# Patient Record
Sex: FEMALE | Race: WHITE | Employment: OTHER | URBAN - METROPOLITAN AREA
[De-identification: names, ages, dates, MRNs, and addresses within clinical notes are randomized per-mention and may not be internally consistent; named-entity substitution may affect disease eponyms.]

---

## 2017-01-09 ENCOUNTER — HOSPITAL ENCOUNTER (EMERGENCY)
Age: 82
Discharge: HOME | End: 2017-01-09
Attending: EMERGENCY MEDICINE
Payer: MEDICARE

## 2017-01-09 VITALS
HEART RATE: 57 BPM | OXYGEN SATURATION: 100 % | DIASTOLIC BLOOD PRESSURE: 57 MMHG | TEMPERATURE: 98.1 F | BODY MASS INDEX: 46.45 KG/M2 | WEIGHT: 230 LBS | SYSTOLIC BLOOD PRESSURE: 132 MMHG | RESPIRATION RATE: 18 BRPM

## 2017-01-09 DIAGNOSIS — R11.11 NON-INTRACTABLE VOMITING WITHOUT NAUSEA, UNSPECIFIED VOMITING TYPE: Primary | ICD-10-CM

## 2017-01-09 LAB
ALBUMIN SERPL-MCNC: 4 G/DL (ref 3.9–4.9)
ALP BLD-CCNC: 98 U/L (ref 40–130)
ALT SERPL-CCNC: 10 U/L (ref 0–33)
ANION GAP SERPL CALCULATED.3IONS-SCNC: 12 MEQ/L (ref 7–13)
AST SERPL-CCNC: 27 U/L (ref 0–35)
BASOPHILS ABSOLUTE: 0 K/UL (ref 0–0.2)
BASOPHILS RELATIVE PERCENT: 0.6 %
BILIRUB SERPL-MCNC: 0.7 MG/DL (ref 0–1.2)
BUN BLDV-MCNC: 16 MG/DL (ref 8–23)
CALCIUM SERPL-MCNC: 8.9 MG/DL (ref 8.6–10.2)
CHLORIDE BLD-SCNC: 100 MEQ/L (ref 98–107)
CO2: 25 MEQ/L (ref 22–29)
CREAT SERPL-MCNC: 0.79 MG/DL (ref 0.5–0.9)
EKG ATRIAL RATE: 416 BPM
EKG Q-T INTERVAL: 396 MS
EKG QRS DURATION: 74 MS
EKG QTC CALCULATION (BAZETT): 418 MS
EKG R AXIS: -6 DEGREES
EKG T AXIS: 55 DEGREES
EKG VENTRICULAR RATE: 67 BPM
EOSINOPHILS ABSOLUTE: 0.1 K/UL (ref 0–0.7)
EOSINOPHILS RELATIVE PERCENT: 1.8 %
GFR AFRICAN AMERICAN: >60
GFR NON-AFRICAN AMERICAN: >60
GLOBULIN: 2.7 G/DL (ref 2.3–3.5)
GLUCOSE BLD-MCNC: 223 MG/DL (ref 74–109)
HCT VFR BLD CALC: 43.4 % (ref 37–47)
HEMOGLOBIN: 14.2 G/DL (ref 12–16)
LIPASE: 68 U/L (ref 13–60)
LYMPHOCYTES ABSOLUTE: 3 K/UL (ref 1–4.8)
LYMPHOCYTES RELATIVE PERCENT: 40.3 %
MCH RBC QN AUTO: 28.8 PG (ref 27–31.3)
MCHC RBC AUTO-ENTMCNC: 32.7 % (ref 33–37)
MCV RBC AUTO: 88 FL (ref 82–100)
MONOCYTES ABSOLUTE: 0.3 K/UL (ref 0.2–0.8)
MONOCYTES RELATIVE PERCENT: 4.3 %
NEUTROPHILS ABSOLUTE: 4 K/UL (ref 1.4–6.5)
NEUTROPHILS RELATIVE PERCENT: 53 %
PDW BLD-RTO: 14.6 % (ref 11.5–14.5)
PLATELET # BLD: 122 K/UL (ref 130–400)
POTASSIUM SERPL-SCNC: 3.8 MEQ/L (ref 3.5–5.1)
RBC # BLD: 4.93 M/UL (ref 4.2–5.4)
SODIUM BLD-SCNC: 137 MEQ/L (ref 132–144)
TOTAL PROTEIN: 6.7 G/DL (ref 6.4–8.1)
WBC # BLD: 7.6 K/UL (ref 4.8–10.8)

## 2017-01-09 PROCEDURE — 93005 ELECTROCARDIOGRAM TRACING: CPT

## 2017-01-09 PROCEDURE — 99283 EMERGENCY DEPT VISIT LOW MDM: CPT

## 2017-01-09 PROCEDURE — 2580000003 HC RX 258: Performed by: EMERGENCY MEDICINE

## 2017-01-09 PROCEDURE — 85025 COMPLETE CBC W/AUTO DIFF WBC: CPT

## 2017-01-09 PROCEDURE — 80053 COMPREHEN METABOLIC PANEL: CPT

## 2017-01-09 PROCEDURE — 96374 THER/PROPH/DIAG INJ IV PUSH: CPT

## 2017-01-09 PROCEDURE — 6360000002 HC RX W HCPCS: Performed by: EMERGENCY MEDICINE

## 2017-01-09 PROCEDURE — 83690 ASSAY OF LIPASE: CPT

## 2017-01-09 PROCEDURE — 36415 COLL VENOUS BLD VENIPUNCTURE: CPT

## 2017-01-09 RX ORDER — ONDANSETRON 2 MG/ML
4 INJECTION INTRAMUSCULAR; INTRAVENOUS ONCE
Status: COMPLETED | OUTPATIENT
Start: 2017-01-09 | End: 2017-01-09

## 2017-01-09 RX ORDER — SODIUM CHLORIDE 0.9 % (FLUSH) 0.9 %
3 SYRINGE (ML) INJECTION EVERY 8 HOURS
Status: DISCONTINUED | OUTPATIENT
Start: 2017-01-09 | End: 2017-01-09 | Stop reason: HOSPADM

## 2017-01-09 RX ORDER — 0.9 % SODIUM CHLORIDE 0.9 %
500 INTRAVENOUS SOLUTION INTRAVENOUS ONCE
Status: COMPLETED | OUTPATIENT
Start: 2017-01-09 | End: 2017-01-09

## 2017-01-09 RX ADMIN — ONDANSETRON 4 MG: 2 INJECTION, SOLUTION INTRAMUSCULAR; INTRAVENOUS at 01:44

## 2017-01-09 RX ADMIN — SODIUM CHLORIDE 500 ML: 9 INJECTION, SOLUTION INTRAVENOUS at 01:44

## 2017-01-09 RX ADMIN — Medication 3 ML: at 01:45

## 2017-01-09 ASSESSMENT — ENCOUNTER SYMPTOMS
TROUBLE SWALLOWING: 0
COUGH: 0
CHEST TIGHTNESS: 0
WHEEZING: 0
PHOTOPHOBIA: 0
SHORTNESS OF BREATH: 0
SORE THROAT: 0
ABDOMINAL PAIN: 0
VOMITING: 1
NAUSEA: 1
ABDOMINAL DISTENTION: 0
EYE DISCHARGE: 0

## 2017-02-21 ENCOUNTER — HOSPITAL ENCOUNTER (EMERGENCY)
Age: 82
Discharge: HOME | End: 2017-02-21
Attending: EMERGENCY MEDICINE
Payer: MEDICARE

## 2017-02-21 VITALS
HEART RATE: 93 BPM | WEIGHT: 200 LBS | HEIGHT: 62 IN | OXYGEN SATURATION: 96 % | RESPIRATION RATE: 18 BRPM | DIASTOLIC BLOOD PRESSURE: 77 MMHG | TEMPERATURE: 96.7 F | SYSTOLIC BLOOD PRESSURE: 147 MMHG | BODY MASS INDEX: 36.8 KG/M2

## 2017-02-21 DIAGNOSIS — E16.2 HYPOGLYCEMIA: Primary | ICD-10-CM

## 2017-02-21 LAB
ANION GAP SERPL CALCULATED.3IONS-SCNC: 13 MEQ/L (ref 7–13)
BUN BLDV-MCNC: 16 MG/DL (ref 8–23)
CALCIUM SERPL-MCNC: 9.2 MG/DL (ref 8.6–10.2)
CHLORIDE BLD-SCNC: 101 MEQ/L (ref 98–107)
CO2: 26 MEQ/L (ref 22–29)
CREAT SERPL-MCNC: 0.74 MG/DL (ref 0.5–0.9)
GFR AFRICAN AMERICAN: >60
GFR NON-AFRICAN AMERICAN: >60
GLUCOSE BLD-MCNC: 31 MG/DL (ref 60–115)
GLUCOSE BLD-MCNC: 60 MG/DL (ref 74–109)
GLUCOSE BLD-MCNC: 81 MG/DL (ref 60–115)
GLUCOSE BLD-MCNC: 92 MG/DL (ref 60–115)
PERFORMED ON: ABNORMAL
PERFORMED ON: NORMAL
PERFORMED ON: NORMAL
POTASSIUM SERPL-SCNC: 3.6 MEQ/L (ref 3.5–5.1)
SODIUM BLD-SCNC: 140 MEQ/L (ref 132–144)

## 2017-02-21 PROCEDURE — 99284 EMERGENCY DEPT VISIT MOD MDM: CPT

## 2017-02-21 PROCEDURE — 2580000003 HC RX 258: Performed by: EMERGENCY MEDICINE

## 2017-02-21 PROCEDURE — 80048 BASIC METABOLIC PNL TOTAL CA: CPT

## 2017-02-21 PROCEDURE — 36415 COLL VENOUS BLD VENIPUNCTURE: CPT

## 2017-02-21 RX ORDER — DEXTROSE MONOHYDRATE 25 G/50ML
INJECTION, SOLUTION INTRAVENOUS
Status: DISCONTINUED
Start: 2017-02-21 | End: 2017-02-21 | Stop reason: HOSPADM

## 2017-02-21 RX ORDER — DEXTROSE MONOHYDRATE 25 G/50ML
25 INJECTION, SOLUTION INTRAVENOUS ONCE
Status: COMPLETED | OUTPATIENT
Start: 2017-02-21 | End: 2017-02-21

## 2017-02-21 RX ADMIN — DEXTROSE MONOHYDRATE 25 G: 500 INJECTION PARENTERAL at 14:00

## 2017-02-21 ASSESSMENT — ENCOUNTER SYMPTOMS
CHEST TIGHTNESS: 0
SHORTNESS OF BREATH: 0
VOMITING: 0
ABDOMINAL PAIN: 0
SORE THROAT: 0
EYE PAIN: 0
NAUSEA: 0

## 2017-05-05 DIAGNOSIS — Z13.220 SCREENING CHOLESTEROL LEVEL: ICD-10-CM

## 2017-05-05 DIAGNOSIS — E03.9 HYPOTHYROIDISM, UNSPECIFIED TYPE: ICD-10-CM

## 2017-05-05 DIAGNOSIS — I10 ESSENTIAL HYPERTENSION: ICD-10-CM

## 2017-05-05 DIAGNOSIS — I48.20 CHRONIC ATRIAL FIBRILLATION (HCC): ICD-10-CM

## 2017-05-05 DIAGNOSIS — Z79.4 TYPE 2 DIABETES MELLITUS WITHOUT COMPLICATION, WITH LONG-TERM CURRENT USE OF INSULIN (HCC): ICD-10-CM

## 2017-05-05 DIAGNOSIS — E11.9 TYPE 2 DIABETES MELLITUS WITHOUT COMPLICATION, WITH LONG-TERM CURRENT USE OF INSULIN (HCC): ICD-10-CM

## 2017-05-05 LAB
ALBUMIN SERPL-MCNC: 3.8 G/DL (ref 3.9–4.9)
ALP BLD-CCNC: 102 U/L (ref 40–130)
ALT SERPL-CCNC: 6 U/L (ref 0–33)
ANION GAP SERPL CALCULATED.3IONS-SCNC: 9 MEQ/L (ref 7–13)
AST SERPL-CCNC: 23 U/L (ref 0–35)
BASOPHILS ABSOLUTE: 0.1 K/UL (ref 0–0.2)
BASOPHILS RELATIVE PERCENT: 0.6 %
BILIRUB SERPL-MCNC: 1 MG/DL (ref 0–1.2)
BUN BLDV-MCNC: 18 MG/DL (ref 8–23)
CALCIUM SERPL-MCNC: 9 MG/DL (ref 8.6–10.2)
CHLORIDE BLD-SCNC: 101 MEQ/L (ref 98–107)
CHOLESTEROL, TOTAL: 172 MG/DL (ref 0–199)
CO2: 30 MEQ/L (ref 22–29)
CREAT SERPL-MCNC: 0.74 MG/DL (ref 0.5–0.9)
EOSINOPHILS ABSOLUTE: 0.2 K/UL (ref 0–0.7)
EOSINOPHILS RELATIVE PERCENT: 1.9 %
GFR AFRICAN AMERICAN: >60
GFR NON-AFRICAN AMERICAN: >60
GLOBULIN: 2.6 G/DL (ref 2.3–3.5)
GLUCOSE BLD-MCNC: 129 MG/DL (ref 74–109)
HBA1C MFR BLD: 5.1 % (ref 4.8–5.9)
HCT VFR BLD CALC: 44.3 % (ref 37–47)
HDLC SERPL-MCNC: 33 MG/DL (ref 40–59)
HEMOGLOBIN: 15 G/DL (ref 12–16)
INR BLD: 1
LDL CHOLESTEROL CALCULATED: 116 MG/DL (ref 0–129)
LYMPHOCYTES ABSOLUTE: 1.9 K/UL (ref 1–4.8)
LYMPHOCYTES RELATIVE PERCENT: 22.6 %
MCH RBC QN AUTO: 30.8 PG (ref 27–31.3)
MCHC RBC AUTO-ENTMCNC: 34 % (ref 33–37)
MCV RBC AUTO: 90.5 FL (ref 82–100)
MONOCYTES ABSOLUTE: 0.4 K/UL (ref 0.2–0.8)
MONOCYTES RELATIVE PERCENT: 5.1 %
NEUTROPHILS ABSOLUTE: 5.8 K/UL (ref 1.4–6.5)
NEUTROPHILS RELATIVE PERCENT: 69.8 %
PDW BLD-RTO: 13.7 % (ref 11.5–14.5)
PLATELET # BLD: 153 K/UL (ref 130–400)
POTASSIUM SERPL-SCNC: 5.1 MEQ/L (ref 3.5–5.1)
PROTHROMBIN TIME: 11.1 SEC (ref 8.1–13.7)
RBC # BLD: 4.89 M/UL (ref 4.2–5.4)
SODIUM BLD-SCNC: 140 MEQ/L (ref 132–144)
TOTAL PROTEIN: 6.4 G/DL (ref 6.4–8.1)
TRIGL SERPL-MCNC: 115 MG/DL (ref 0–200)
TSH SERPL DL<=0.05 MIU/L-ACNC: 9.32 UIU/ML (ref 0.27–4.2)
WBC # BLD: 8.3 K/UL (ref 4.8–10.8)

## 2017-07-25 ENCOUNTER — APPOINTMENT (OUTPATIENT)
Dept: GENERAL RADIOLOGY | Age: 82
DRG: 690 | End: 2017-07-25
Payer: MEDICARE

## 2017-07-25 ENCOUNTER — HOSPITAL ENCOUNTER (INPATIENT)
Age: 82
LOS: 3 days | Discharge: NURSING, REHAB OR LONG-TERM CARE | DRG: 690 | End: 2017-07-29
Attending: EMERGENCY MEDICINE | Admitting: INTERNAL MEDICINE
Payer: MEDICARE

## 2017-07-25 DIAGNOSIS — N30.00 ACUTE CYSTITIS WITHOUT HEMATURIA: ICD-10-CM

## 2017-07-25 DIAGNOSIS — R53.1 GENERAL WEAKNESS: ICD-10-CM

## 2017-07-25 DIAGNOSIS — R00.1 BRADYCARDIA: Primary | ICD-10-CM

## 2017-07-25 LAB
ABO/RH: NORMAL
ALBUMIN SERPL-MCNC: 3.5 G/DL (ref 3.9–4.9)
ALP BLD-CCNC: 75 U/L (ref 40–130)
ALT SERPL-CCNC: <5 U/L (ref 0–33)
ANION GAP SERPL CALCULATED.3IONS-SCNC: 12 MEQ/L (ref 7–13)
ANTIBODY SCREEN: NORMAL
AST SERPL-CCNC: 17 U/L (ref 0–35)
BACTERIA: ABNORMAL /HPF
BASOPHILS ABSOLUTE: 0 K/UL (ref 0–0.2)
BASOPHILS RELATIVE PERCENT: 0.3 %
BILIRUB SERPL-MCNC: 1.7 MG/DL (ref 0–1.2)
BILIRUBIN URINE: ABNORMAL
BLOOD, URINE: ABNORMAL
BUN BLDV-MCNC: 17 MG/DL (ref 8–23)
CALCIUM SERPL-MCNC: 8.4 MG/DL (ref 8.6–10.2)
CHLORIDE BLD-SCNC: 98 MEQ/L (ref 98–107)
CLARITY: ABNORMAL
CO2: 24 MEQ/L (ref 22–29)
COLOR: ABNORMAL
CREAT SERPL-MCNC: 0.77 MG/DL (ref 0.5–0.9)
DIGOXIN LEVEL: 2 NG/ML (ref 0.8–2)
EOSINOPHILS ABSOLUTE: 0 K/UL (ref 0–0.7)
EOSINOPHILS RELATIVE PERCENT: 0.1 %
EPITHELIAL CELLS, UA: ABNORMAL /HPF
GFR AFRICAN AMERICAN: >60
GFR NON-AFRICAN AMERICAN: >60
GLOBULIN: 2.5 G/DL (ref 2.3–3.5)
GLUCOSE BLD-MCNC: 163 MG/DL (ref 74–109)
GLUCOSE URINE: NEGATIVE MG/DL
HCT VFR BLD CALC: 35.1 % (ref 37–47)
HEMOGLOBIN: 11.5 G/DL (ref 12–16)
KETONES, URINE: ABNORMAL MG/DL
LEUKOCYTE ESTERASE, URINE: ABNORMAL
LYMPHOCYTES ABSOLUTE: 1.2 K/UL (ref 1–4.8)
LYMPHOCYTES RELATIVE PERCENT: 10.1 %
MCH RBC QN AUTO: 29.3 PG (ref 27–31.3)
MCHC RBC AUTO-ENTMCNC: 32.9 % (ref 33–37)
MCV RBC AUTO: 89.3 FL (ref 82–100)
MONOCYTES ABSOLUTE: 0.9 K/UL (ref 0.2–0.8)
MONOCYTES RELATIVE PERCENT: 7.9 %
NEUTROPHILS ABSOLUTE: 9.5 K/UL (ref 1.4–6.5)
NEUTROPHILS RELATIVE PERCENT: 81.6 %
NITRITE, URINE: POSITIVE
PDW BLD-RTO: 14.7 % (ref 11.5–14.5)
PH UA: 5 (ref 5–9)
PLATELET # BLD: 186 K/UL (ref 130–400)
POTASSIUM SERPL-SCNC: 3.5 MEQ/L (ref 3.5–5.1)
PROTEIN UA: ABNORMAL MG/DL
RBC # BLD: 3.93 M/UL (ref 4.2–5.4)
RBC UA: ABNORMAL /HPF (ref 0–2)
SODIUM BLD-SCNC: 134 MEQ/L (ref 132–144)
SPECIFIC GRAVITY UA: 1.02 (ref 1–1.03)
TOTAL PROTEIN: 6 G/DL (ref 6.4–8.1)
URINE REFLEX TO CULTURE: YES
UROBILINOGEN, URINE: 2 E.U./DL
WBC # BLD: 11.6 K/UL (ref 4.8–10.8)
WBC UA: ABNORMAL /HPF (ref 0–5)

## 2017-07-25 PROCEDURE — 96365 THER/PROPH/DIAG IV INF INIT: CPT

## 2017-07-25 PROCEDURE — 2580000003 HC RX 258: Performed by: EMERGENCY MEDICINE

## 2017-07-25 PROCEDURE — 6360000002 HC RX W HCPCS: Performed by: EMERGENCY MEDICINE

## 2017-07-25 PROCEDURE — 86850 RBC ANTIBODY SCREEN: CPT

## 2017-07-25 PROCEDURE — 80053 COMPREHEN METABOLIC PANEL: CPT

## 2017-07-25 PROCEDURE — 80162 ASSAY OF DIGOXIN TOTAL: CPT

## 2017-07-25 PROCEDURE — 87086 URINE CULTURE/COLONY COUNT: CPT

## 2017-07-25 PROCEDURE — 93005 ELECTROCARDIOGRAM TRACING: CPT

## 2017-07-25 PROCEDURE — 86900 BLOOD TYPING SEROLOGIC ABO: CPT

## 2017-07-25 PROCEDURE — 81001 URINALYSIS AUTO W/SCOPE: CPT

## 2017-07-25 PROCEDURE — 99285 EMERGENCY DEPT VISIT HI MDM: CPT

## 2017-07-25 PROCEDURE — 71010 XR CHEST PORTABLE: CPT

## 2017-07-25 PROCEDURE — 85025 COMPLETE CBC W/AUTO DIFF WBC: CPT

## 2017-07-25 PROCEDURE — 36415 COLL VENOUS BLD VENIPUNCTURE: CPT

## 2017-07-25 PROCEDURE — 86901 BLOOD TYPING SEROLOGIC RH(D): CPT

## 2017-07-25 RX ORDER — 0.9 % SODIUM CHLORIDE 0.9 %
1000 INTRAVENOUS SOLUTION INTRAVENOUS ONCE
Status: COMPLETED | OUTPATIENT
Start: 2017-07-25 | End: 2017-07-26

## 2017-07-25 RX ADMIN — SODIUM CHLORIDE 1000 ML: 9 INJECTION, SOLUTION INTRAVENOUS at 22:06

## 2017-07-25 RX ADMIN — CEFTRIAXONE SODIUM 1 G: 1 INJECTION, POWDER, FOR SOLUTION INTRAMUSCULAR; INTRAVENOUS at 23:56

## 2017-07-25 ASSESSMENT — ENCOUNTER SYMPTOMS
PHOTOPHOBIA: 0
SORE THROAT: 0
ABDOMINAL DISTENTION: 0
ABDOMINAL PAIN: 0
SHORTNESS OF BREATH: 0
VOMITING: 0
CHEST TIGHTNESS: 0
EYE DISCHARGE: 0
COUGH: 0
WHEEZING: 0

## 2017-07-26 PROBLEM — R79.1 SUPRATHERAPEUTIC INR: Status: ACTIVE | Noted: 2017-07-26

## 2017-07-26 PROBLEM — R00.1 BRADYCARDIA: Status: ACTIVE | Noted: 2017-07-26

## 2017-07-26 PROBLEM — R53.1 WEAKNESS: Status: ACTIVE | Noted: 2017-07-26

## 2017-07-26 PROBLEM — N39.0 UTI (URINARY TRACT INFECTION): Status: ACTIVE | Noted: 2017-07-26

## 2017-07-26 LAB
ANION GAP SERPL CALCULATED.3IONS-SCNC: 16 MEQ/L (ref 7–13)
BACTERIA: NORMAL /HPF
BASOPHILS ABSOLUTE: 0.1 K/UL (ref 0–0.2)
BASOPHILS RELATIVE PERCENT: 0.5 %
BILIRUBIN URINE: ABNORMAL
BLOOD, URINE: NEGATIVE
BUN BLDV-MCNC: 18 MG/DL (ref 8–23)
CALCIUM SERPL-MCNC: 8.1 MG/DL (ref 8.6–10.2)
CASTS: NORMAL /LPF
CHLORIDE BLD-SCNC: 99 MEQ/L (ref 98–107)
CLARITY: ABNORMAL
CO2: 20 MEQ/L (ref 22–29)
COLOR: ABNORMAL
CREAT SERPL-MCNC: 0.69 MG/DL (ref 0.5–0.9)
EOSINOPHILS ABSOLUTE: 0 K/UL (ref 0–0.7)
EOSINOPHILS RELATIVE PERCENT: 0.1 %
EPITHELIAL CELLS, UA: NORMAL /HPF
GFR AFRICAN AMERICAN: >60
GFR NON-AFRICAN AMERICAN: >60
GLUCOSE BLD-MCNC: 118 MG/DL (ref 74–109)
GLUCOSE BLD-MCNC: 133 MG/DL (ref 60–115)
GLUCOSE BLD-MCNC: 149 MG/DL (ref 60–115)
GLUCOSE BLD-MCNC: 157 MG/DL (ref 60–115)
GLUCOSE BLD-MCNC: 169 MG/DL (ref 60–115)
GLUCOSE URINE: NEGATIVE MG/DL
HBA1C MFR BLD: 5.5 % (ref 4.8–5.9)
HCT VFR BLD CALC: 33.7 % (ref 37–47)
HEMOGLOBIN: 11.1 G/DL (ref 12–16)
INR BLD: 11.2
KETONES, URINE: ABNORMAL MG/DL
LEUKOCYTE ESTERASE, URINE: ABNORMAL
LYMPHOCYTES ABSOLUTE: 1.8 K/UL (ref 1–4.8)
LYMPHOCYTES RELATIVE PERCENT: 16.6 %
MCH RBC QN AUTO: 29.3 PG (ref 27–31.3)
MCHC RBC AUTO-ENTMCNC: 32.9 % (ref 33–37)
MCV RBC AUTO: 88.9 FL (ref 82–100)
MONOCYTES ABSOLUTE: 0.9 K/UL (ref 0.2–0.8)
MONOCYTES RELATIVE PERCENT: 8 %
NEUTROPHILS ABSOLUTE: 8.1 K/UL (ref 1.4–6.5)
NEUTROPHILS RELATIVE PERCENT: 74.8 %
NITRITE, URINE: POSITIVE
PDW BLD-RTO: 14.9 % (ref 11.5–14.5)
PERFORMED ON: ABNORMAL
PH UA: 5 (ref 5–9)
PLATELET # BLD: 166 K/UL (ref 130–400)
POTASSIUM SERPL-SCNC: 3.9 MEQ/L (ref 3.5–5.1)
PROTEIN UA: ABNORMAL MG/DL
PROTHROMBIN TIME: 129.7 SEC (ref 8.1–13.7)
RBC # BLD: 3.79 M/UL (ref 4.2–5.4)
RBC UA: NORMAL /HPF (ref 0–2)
RENAL EPITHELIAL, UA: NORMAL /HPF
SODIUM BLD-SCNC: 135 MEQ/L (ref 132–144)
SPECIFIC GRAVITY UA: 1.02 (ref 1–1.03)
UROBILINOGEN, URINE: 2 E.U./DL
WBC # BLD: 10.8 K/UL (ref 4.8–10.8)
WBC UA: NORMAL /HPF (ref 0–5)

## 2017-07-26 PROCEDURE — 36415 COLL VENOUS BLD VENIPUNCTURE: CPT

## 2017-07-26 PROCEDURE — 6360000002 HC RX W HCPCS: Performed by: INTERNAL MEDICINE

## 2017-07-26 PROCEDURE — 85610 PROTHROMBIN TIME: CPT

## 2017-07-26 PROCEDURE — 6370000000 HC RX 637 (ALT 250 FOR IP): Performed by: INTERNAL MEDICINE

## 2017-07-26 PROCEDURE — 2580000003 HC RX 258: Performed by: INTERNAL MEDICINE

## 2017-07-26 PROCEDURE — 1210000000 HC MED SURG R&B

## 2017-07-26 PROCEDURE — 83036 HEMOGLOBIN GLYCOSYLATED A1C: CPT

## 2017-07-26 PROCEDURE — 80048 BASIC METABOLIC PNL TOTAL CA: CPT

## 2017-07-26 PROCEDURE — 81001 URINALYSIS AUTO W/SCOPE: CPT

## 2017-07-26 PROCEDURE — 85025 COMPLETE CBC W/AUTO DIFF WBC: CPT

## 2017-07-26 PROCEDURE — 93005 ELECTROCARDIOGRAM TRACING: CPT

## 2017-07-26 RX ORDER — INSULIN GLARGINE 100 [IU]/ML
0.25 INJECTION, SOLUTION SUBCUTANEOUS NIGHTLY
Status: DISCONTINUED | OUTPATIENT
Start: 2017-07-26 | End: 2017-07-27

## 2017-07-26 RX ORDER — SODIUM CHLORIDE 0.9 % (FLUSH) 0.9 %
10 SYRINGE (ML) INJECTION PRN
Status: DISCONTINUED | OUTPATIENT
Start: 2017-07-26 | End: 2017-07-29 | Stop reason: HOSPADM

## 2017-07-26 RX ORDER — TRIAMCINOLONE ACETONIDE 1 MG/G
CREAM TOPICAL 2 TIMES DAILY
Status: DISCONTINUED | OUTPATIENT
Start: 2017-07-26 | End: 2017-07-29 | Stop reason: HOSPADM

## 2017-07-26 RX ORDER — ONDANSETRON 2 MG/ML
4 INJECTION INTRAMUSCULAR; INTRAVENOUS EVERY 6 HOURS PRN
Status: DISCONTINUED | OUTPATIENT
Start: 2017-07-26 | End: 2017-07-29 | Stop reason: HOSPADM

## 2017-07-26 RX ORDER — NICOTINE POLACRILEX 4 MG
15 LOZENGE BUCCAL PRN
Status: DISCONTINUED | OUTPATIENT
Start: 2017-07-26 | End: 2017-07-29 | Stop reason: HOSPADM

## 2017-07-26 RX ORDER — ALLOPURINOL 100 MG/1
100 TABLET ORAL DAILY
Status: DISCONTINUED | OUTPATIENT
Start: 2017-07-26 | End: 2017-07-29 | Stop reason: HOSPADM

## 2017-07-26 RX ORDER — ACETAMINOPHEN 325 MG/1
650 TABLET ORAL EVERY 4 HOURS PRN
Status: DISCONTINUED | OUTPATIENT
Start: 2017-07-26 | End: 2017-07-29 | Stop reason: HOSPADM

## 2017-07-26 RX ORDER — SODIUM CHLORIDE 0.9 % (FLUSH) 0.9 %
10 SYRINGE (ML) INJECTION EVERY 12 HOURS SCHEDULED
Status: DISCONTINUED | OUTPATIENT
Start: 2017-07-26 | End: 2017-07-29 | Stop reason: HOSPADM

## 2017-07-26 RX ORDER — DEXTROSE MONOHYDRATE 25 G/50ML
12.5 INJECTION, SOLUTION INTRAVENOUS PRN
Status: DISCONTINUED | OUTPATIENT
Start: 2017-07-26 | End: 2017-07-29 | Stop reason: HOSPADM

## 2017-07-26 RX ORDER — LEVOTHYROXINE SODIUM 0.05 MG/1
50 TABLET ORAL DAILY
Status: DISCONTINUED | OUTPATIENT
Start: 2017-07-26 | End: 2017-07-29 | Stop reason: HOSPADM

## 2017-07-26 RX ORDER — POTASSIUM CHLORIDE 750 MG/1
10 TABLET, FILM COATED, EXTENDED RELEASE ORAL DAILY
Status: DISCONTINUED | OUTPATIENT
Start: 2017-07-26 | End: 2017-07-26

## 2017-07-26 RX ORDER — PHYTONADIONE 10 MG/ML
5 INJECTION, EMULSION INTRAMUSCULAR; INTRAVENOUS; SUBCUTANEOUS ONCE
Status: COMPLETED | OUTPATIENT
Start: 2017-07-26 | End: 2017-07-26

## 2017-07-26 RX ORDER — SODIUM CHLORIDE 9 MG/ML
INJECTION, SOLUTION INTRAVENOUS CONTINUOUS
Status: DISCONTINUED | OUTPATIENT
Start: 2017-07-26 | End: 2017-07-27

## 2017-07-26 RX ORDER — DEXTROSE MONOHYDRATE 50 MG/ML
100 INJECTION, SOLUTION INTRAVENOUS PRN
Status: DISCONTINUED | OUTPATIENT
Start: 2017-07-26 | End: 2017-07-29 | Stop reason: HOSPADM

## 2017-07-26 RX ORDER — LOSARTAN POTASSIUM 25 MG/1
25 TABLET ORAL
Status: DISCONTINUED | OUTPATIENT
Start: 2017-07-26 | End: 2017-07-29 | Stop reason: HOSPADM

## 2017-07-26 RX ADMIN — INSULIN GLARGINE 24 UNITS: 100 INJECTION, SOLUTION SUBCUTANEOUS at 21:30

## 2017-07-26 RX ADMIN — ALLOPURINOL 100 MG: 100 TABLET ORAL at 09:58

## 2017-07-26 RX ADMIN — LEVOTHYROXINE SODIUM 50 MCG: 50 TABLET ORAL at 09:58

## 2017-07-26 RX ADMIN — SODIUM CHLORIDE: 9 INJECTION, SOLUTION INTRAVENOUS at 03:41

## 2017-07-26 RX ADMIN — Medication 400 MG: at 09:58

## 2017-07-26 RX ADMIN — POTASSIUM CHLORIDE 10 MEQ: 750 TABLET, FILM COATED, EXTENDED RELEASE ORAL at 09:58

## 2017-07-26 RX ADMIN — LOSARTAN POTASSIUM 25 MG: 25 TABLET, FILM COATED ORAL at 09:58

## 2017-07-26 RX ADMIN — SODIUM CHLORIDE: 9 INJECTION, SOLUTION INTRAVENOUS at 16:41

## 2017-07-26 RX ADMIN — PHYTONADIONE 5 MG: 10 INJECTION, EMULSION INTRAMUSCULAR; INTRAVENOUS; SUBCUTANEOUS at 09:59

## 2017-07-26 RX ADMIN — Medication 10 ML: at 21:30

## 2017-07-26 ASSESSMENT — ENCOUNTER SYMPTOMS
EYES NEGATIVE: 1
ALLERGIC/IMMUNOLOGIC NEGATIVE: 1
GASTROINTESTINAL NEGATIVE: 1
RESPIRATORY NEGATIVE: 1

## 2017-07-26 ASSESSMENT — PAIN SCALES - GENERAL
PAINLEVEL_OUTOF10: 0
PAINLEVEL_OUTOF10: 0

## 2017-07-27 LAB
ALBUMIN SERPL-MCNC: 2.9 G/DL (ref 3.9–4.9)
ALP BLD-CCNC: 65 U/L (ref 40–130)
ALT SERPL-CCNC: <5 U/L (ref 0–33)
ANION GAP SERPL CALCULATED.3IONS-SCNC: 11 MEQ/L (ref 7–13)
AST SERPL-CCNC: 13 U/L (ref 0–35)
BASOPHILS ABSOLUTE: 0 K/UL (ref 0–0.2)
BASOPHILS RELATIVE PERCENT: 0.3 %
BILIRUB SERPL-MCNC: 0.8 MG/DL (ref 0–1.2)
BUN BLDV-MCNC: 16 MG/DL (ref 8–23)
CALCIUM SERPL-MCNC: 7.9 MG/DL (ref 8.6–10.2)
CHLORIDE BLD-SCNC: 102 MEQ/L (ref 98–107)
CO2: 22 MEQ/L (ref 22–29)
CREAT SERPL-MCNC: 0.71 MG/DL (ref 0.5–0.9)
EOSINOPHILS ABSOLUTE: 0.1 K/UL (ref 0–0.7)
EOSINOPHILS RELATIVE PERCENT: 0.7 %
GFR AFRICAN AMERICAN: >60
GFR NON-AFRICAN AMERICAN: >60
GLOBULIN: 2.2 G/DL (ref 2.3–3.5)
GLUCOSE BLD-MCNC: 109 MG/DL (ref 60–115)
GLUCOSE BLD-MCNC: 113 MG/DL (ref 60–115)
GLUCOSE BLD-MCNC: 163 MG/DL (ref 60–115)
GLUCOSE BLD-MCNC: 86 MG/DL (ref 74–109)
GLUCOSE BLD-MCNC: 87 MG/DL (ref 60–115)
HCT VFR BLD CALC: 31.7 % (ref 37–47)
HEMOGLOBIN: 10.5 G/DL (ref 12–16)
INR BLD: 8.8
LV EF: 50 %
LVEF MODALITY: NORMAL
LYMPHOCYTES ABSOLUTE: 1.6 K/UL (ref 1–4.8)
LYMPHOCYTES RELATIVE PERCENT: 16.6 %
MAGNESIUM: 1.8 MG/DL (ref 1.7–2.3)
MCH RBC QN AUTO: 29.6 PG (ref 27–31.3)
MCHC RBC AUTO-ENTMCNC: 33.2 % (ref 33–37)
MCV RBC AUTO: 89.1 FL (ref 82–100)
MONOCYTES ABSOLUTE: 0.8 K/UL (ref 0.2–0.8)
MONOCYTES RELATIVE PERCENT: 8.1 %
NEUTROPHILS ABSOLUTE: 7.1 K/UL (ref 1.4–6.5)
NEUTROPHILS RELATIVE PERCENT: 74.3 %
PDW BLD-RTO: 14.7 % (ref 11.5–14.5)
PERFORMED ON: ABNORMAL
PERFORMED ON: NORMAL
PLATELET # BLD: 165 K/UL (ref 130–400)
POTASSIUM SERPL-SCNC: 3.7 MEQ/L (ref 3.5–5.1)
PROTHROMBIN TIME: 101.8 SEC (ref 8.1–13.7)
RBC # BLD: 3.56 M/UL (ref 4.2–5.4)
SODIUM BLD-SCNC: 135 MEQ/L (ref 132–144)
TOTAL PROTEIN: 5.1 G/DL (ref 6.4–8.1)
URINE CULTURE, ROUTINE: NORMAL
WBC # BLD: 9.6 K/UL (ref 4.8–10.8)

## 2017-07-27 PROCEDURE — 2580000003 HC RX 258: Performed by: INTERNAL MEDICINE

## 2017-07-27 PROCEDURE — C8929 TTE W OR WO FOL WCON,DOPPLER: HCPCS

## 2017-07-27 PROCEDURE — 36415 COLL VENOUS BLD VENIPUNCTURE: CPT

## 2017-07-27 PROCEDURE — 85610 PROTHROMBIN TIME: CPT

## 2017-07-27 PROCEDURE — 83735 ASSAY OF MAGNESIUM: CPT

## 2017-07-27 PROCEDURE — 6370000000 HC RX 637 (ALT 250 FOR IP): Performed by: INTERNAL MEDICINE

## 2017-07-27 PROCEDURE — 80053 COMPREHEN METABOLIC PANEL: CPT

## 2017-07-27 PROCEDURE — 1210000000 HC MED SURG R&B

## 2017-07-27 PROCEDURE — 6360000004 HC RX CONTRAST MEDICATION

## 2017-07-27 PROCEDURE — 85025 COMPLETE CBC W/AUTO DIFF WBC: CPT

## 2017-07-27 PROCEDURE — 6360000002 HC RX W HCPCS: Performed by: INTERNAL MEDICINE

## 2017-07-27 RX ORDER — DEXTROSE AND SODIUM CHLORIDE 5; .9 G/100ML; G/100ML
INJECTION, SOLUTION INTRAVENOUS CONTINUOUS
Status: DISCONTINUED | OUTPATIENT
Start: 2017-07-27 | End: 2017-07-29 | Stop reason: HOSPADM

## 2017-07-27 RX ORDER — PHYTONADIONE 5 MG/1
5 TABLET ORAL ONCE
Status: COMPLETED | OUTPATIENT
Start: 2017-07-27 | End: 2017-07-27

## 2017-07-27 RX ORDER — INSULIN GLARGINE 100 [IU]/ML
20 INJECTION, SOLUTION SUBCUTANEOUS NIGHTLY
Status: DISCONTINUED | OUTPATIENT
Start: 2017-07-27 | End: 2017-07-29 | Stop reason: HOSPADM

## 2017-07-27 RX ADMIN — ALLOPURINOL 100 MG: 100 TABLET ORAL at 09:26

## 2017-07-27 RX ADMIN — SODIUM CHLORIDE: 9 INJECTION, SOLUTION INTRAVENOUS at 05:18

## 2017-07-27 RX ADMIN — PHYTONADIONE 5 MG: 5 TABLET ORAL at 12:11

## 2017-07-27 RX ADMIN — TRIAMCINOLONE ACETONIDE: 1 CREAM TOPICAL at 09:27

## 2017-07-27 RX ADMIN — PERFLUTREN 1.6 MG: 6.52 INJECTION, SUSPENSION INTRAVENOUS at 11:06

## 2017-07-27 RX ADMIN — CEFTRIAXONE 1 G: 1 INJECTION, POWDER, FOR SOLUTION INTRAMUSCULAR; INTRAVENOUS at 00:04

## 2017-07-27 RX ADMIN — LEVOTHYROXINE SODIUM 50 MCG: 50 TABLET ORAL at 05:18

## 2017-07-27 RX ADMIN — DEXTROSE AND SODIUM CHLORIDE: 5; 900 INJECTION, SOLUTION INTRAVENOUS at 12:11

## 2017-07-27 ASSESSMENT — PAIN SCALES - GENERAL: PAINLEVEL_OUTOF10: 0

## 2017-07-28 LAB
ANION GAP SERPL CALCULATED.3IONS-SCNC: 10 MEQ/L (ref 7–13)
BUN BLDV-MCNC: 10 MG/DL (ref 8–23)
CALCIUM SERPL-MCNC: 8 MG/DL (ref 8.6–10.2)
CHLORIDE BLD-SCNC: 102 MEQ/L (ref 98–107)
CO2: 22 MEQ/L (ref 22–29)
CREAT SERPL-MCNC: 0.59 MG/DL (ref 0.5–0.9)
GFR AFRICAN AMERICAN: >60
GFR NON-AFRICAN AMERICAN: >60
GLUCOSE BLD-MCNC: 108 MG/DL (ref 74–109)
GLUCOSE BLD-MCNC: 109 MG/DL (ref 60–115)
GLUCOSE BLD-MCNC: 123 MG/DL (ref 60–115)
GLUCOSE BLD-MCNC: 123 MG/DL (ref 60–115)
GLUCOSE BLD-MCNC: 134 MG/DL (ref 60–115)
INR BLD: 2.6
PERFORMED ON: ABNORMAL
PERFORMED ON: NORMAL
POTASSIUM SERPL-SCNC: 3.9 MEQ/L (ref 3.5–5.1)
PROTHROMBIN TIME: 27.4 SEC (ref 8.1–13.7)
SODIUM BLD-SCNC: 134 MEQ/L (ref 132–144)

## 2017-07-28 PROCEDURE — 2580000003 HC RX 258: Performed by: INTERNAL MEDICINE

## 2017-07-28 PROCEDURE — G8978 MOBILITY CURRENT STATUS: HCPCS

## 2017-07-28 PROCEDURE — 6370000000 HC RX 637 (ALT 250 FOR IP): Performed by: INTERNAL MEDICINE

## 2017-07-28 PROCEDURE — 6360000002 HC RX W HCPCS: Performed by: INTERNAL MEDICINE

## 2017-07-28 PROCEDURE — 85610 PROTHROMBIN TIME: CPT

## 2017-07-28 PROCEDURE — 94762 N-INVAS EAR/PLS OXIMTRY CONT: CPT

## 2017-07-28 PROCEDURE — 36415 COLL VENOUS BLD VENIPUNCTURE: CPT

## 2017-07-28 PROCEDURE — 93005 ELECTROCARDIOGRAM TRACING: CPT

## 2017-07-28 PROCEDURE — G8979 MOBILITY GOAL STATUS: HCPCS

## 2017-07-28 PROCEDURE — 97162 PT EVAL MOD COMPLEX 30 MIN: CPT

## 2017-07-28 PROCEDURE — 80048 BASIC METABOLIC PNL TOTAL CA: CPT

## 2017-07-28 PROCEDURE — 97167 OT EVAL HIGH COMPLEX 60 MIN: CPT

## 2017-07-28 PROCEDURE — 1210000000 HC MED SURG R&B

## 2017-07-28 PROCEDURE — G8988 SELF CARE GOAL STATUS: HCPCS

## 2017-07-28 PROCEDURE — G8987 SELF CARE CURRENT STATUS: HCPCS

## 2017-07-28 RX ORDER — INSULIN GLARGINE 100 [IU]/ML
20 INJECTION, SOLUTION SUBCUTANEOUS NIGHTLY
Qty: 1 VIAL | Refills: 3 | Status: SHIPPED | OUTPATIENT
Start: 2017-07-28 | End: 2017-08-01 | Stop reason: SDUPTHER

## 2017-07-28 RX ORDER — WARFARIN SODIUM 2 MG/1
4 TABLET ORAL DAILY
Status: DISCONTINUED | OUTPATIENT
Start: 2017-07-28 | End: 2017-07-28

## 2017-07-28 RX ORDER — WARFARIN SODIUM 2 MG/1
4 TABLET ORAL
Status: DISCONTINUED | OUTPATIENT
Start: 2017-07-28 | End: 2017-07-28 | Stop reason: ALTCHOICE

## 2017-07-28 RX ADMIN — LOSARTAN POTASSIUM 25 MG: 25 TABLET, FILM COATED ORAL at 09:20

## 2017-07-28 RX ADMIN — INSULIN GLARGINE 20 UNITS: 100 INJECTION, SOLUTION SUBCUTANEOUS at 01:11

## 2017-07-28 RX ADMIN — DEXTROSE AND SODIUM CHLORIDE: 5; 900 INJECTION, SOLUTION INTRAVENOUS at 02:27

## 2017-07-28 RX ADMIN — DEXTROSE AND SODIUM CHLORIDE: 5; 900 INJECTION, SOLUTION INTRAVENOUS at 17:15

## 2017-07-28 RX ADMIN — ALLOPURINOL 100 MG: 100 TABLET ORAL at 09:20

## 2017-07-28 RX ADMIN — LEVOTHYROXINE SODIUM 50 MCG: 50 TABLET ORAL at 06:50

## 2017-07-28 RX ADMIN — CEFTRIAXONE 1 G: 1 INJECTION, POWDER, FOR SOLUTION INTRAMUSCULAR; INTRAVENOUS at 00:31

## 2017-07-28 RX ADMIN — Medication 10 ML: at 21:32

## 2017-07-28 RX ADMIN — TRIAMCINOLONE ACETONIDE: 1 CREAM TOPICAL at 21:38

## 2017-07-29 VITALS
WEIGHT: 199.52 LBS | HEIGHT: 62 IN | OXYGEN SATURATION: 99 % | BODY MASS INDEX: 36.72 KG/M2 | RESPIRATION RATE: 18 BRPM | HEART RATE: 57 BPM | SYSTOLIC BLOOD PRESSURE: 113 MMHG | DIASTOLIC BLOOD PRESSURE: 44 MMHG | TEMPERATURE: 97.5 F

## 2017-07-29 LAB
ANION GAP SERPL CALCULATED.3IONS-SCNC: 10 MEQ/L (ref 7–13)
BUN BLDV-MCNC: 7 MG/DL (ref 8–23)
CALCIUM SERPL-MCNC: 7.8 MG/DL (ref 8.6–10.2)
CHLORIDE BLD-SCNC: 104 MEQ/L (ref 98–107)
CO2: 24 MEQ/L (ref 22–29)
CREAT SERPL-MCNC: 0.48 MG/DL (ref 0.5–0.9)
GFR AFRICAN AMERICAN: >60
GFR NON-AFRICAN AMERICAN: >60
GLUCOSE BLD-MCNC: 132 MG/DL (ref 60–115)
GLUCOSE BLD-MCNC: 96 MG/DL (ref 74–109)
INR BLD: 1.9
PERFORMED ON: ABNORMAL
POTASSIUM SERPL-SCNC: 3.6 MEQ/L (ref 3.5–5.1)
PROTHROMBIN TIME: 19.9 SEC (ref 8.1–13.7)
SODIUM BLD-SCNC: 138 MEQ/L (ref 132–144)

## 2017-07-29 PROCEDURE — 36415 COLL VENOUS BLD VENIPUNCTURE: CPT

## 2017-07-29 PROCEDURE — 6360000002 HC RX W HCPCS: Performed by: INTERNAL MEDICINE

## 2017-07-29 PROCEDURE — 80048 BASIC METABOLIC PNL TOTAL CA: CPT

## 2017-07-29 PROCEDURE — 85610 PROTHROMBIN TIME: CPT

## 2017-07-29 PROCEDURE — 2580000003 HC RX 258: Performed by: INTERNAL MEDICINE

## 2017-07-29 PROCEDURE — 6370000000 HC RX 637 (ALT 250 FOR IP): Performed by: INTERNAL MEDICINE

## 2017-07-29 RX ADMIN — LEVOTHYROXINE SODIUM 50 MCG: 50 TABLET ORAL at 06:31

## 2017-07-29 RX ADMIN — TRIAMCINOLONE ACETONIDE: 1 CREAM TOPICAL at 12:46

## 2017-07-29 RX ADMIN — ALLOPURINOL 100 MG: 100 TABLET ORAL at 09:35

## 2017-07-29 RX ADMIN — CEFTRIAXONE 1 G: 1 INJECTION, POWDER, FOR SOLUTION INTRAMUSCULAR; INTRAVENOUS at 01:03

## 2017-07-31 ENCOUNTER — OFFICE VISIT (OUTPATIENT)
Dept: GERIATRIC MEDICINE | Age: 82
End: 2017-07-31

## 2017-07-31 DIAGNOSIS — R00.1 BRADYCARDIA: Primary | ICD-10-CM

## 2017-07-31 DIAGNOSIS — R53.1 WEAKNESS: ICD-10-CM

## 2017-07-31 DIAGNOSIS — I48.0 PAROXYSMAL ATRIAL FIBRILLATION (HCC): ICD-10-CM

## 2017-07-31 DIAGNOSIS — F03.90 DEMENTIA WITHOUT BEHAVIORAL DISTURBANCE, UNSPECIFIED DEMENTIA TYPE: ICD-10-CM

## 2017-07-31 LAB
EKG ATRIAL RATE: 150 BPM
EKG ATRIAL RATE: 192 BPM
EKG Q-T INTERVAL: 356 MS
EKG Q-T INTERVAL: 424 MS
EKG QRS DURATION: 78 MS
EKG QRS DURATION: 78 MS
EKG QTC CALCULATION (BAZETT): 311 MS
EKG QTC CALCULATION (BAZETT): 362 MS
EKG R AXIS: -14 DEGREES
EKG R AXIS: -15 DEGREES
EKG T AXIS: -9 DEGREES
EKG T AXIS: 170 DEGREES
EKG VENTRICULAR RATE: 44 BPM
EKG VENTRICULAR RATE: 46 BPM

## 2017-07-31 PROCEDURE — 99305 1ST NF CARE MODERATE MDM 35: CPT | Performed by: INTERNAL MEDICINE

## 2017-08-01 VITALS — TEMPERATURE: 98.2 F | DIASTOLIC BLOOD PRESSURE: 88 MMHG | HEART RATE: 86 BPM | SYSTOLIC BLOOD PRESSURE: 133 MMHG

## 2017-08-01 RX ORDER — LANOLIN ALCOHOL/MO/W.PET/CERES
1000 CREAM (GRAM) TOPICAL DAILY
COMMUNITY
End: 2018-10-09 | Stop reason: ALTCHOICE

## 2017-08-01 RX ORDER — FUROSEMIDE 40 MG/1
40 TABLET ORAL SEE ADMIN INSTRUCTIONS
COMMUNITY
End: 2017-09-26 | Stop reason: SDUPTHER

## 2017-08-01 RX ORDER — WATER / MINERAL OIL / WHITE PETROLATUM 16 OZ
CREAM TOPICAL PRN
COMMUNITY

## 2017-08-01 RX ORDER — LOSARTAN POTASSIUM 25 MG/1
25 TABLET ORAL SEE ADMIN INSTRUCTIONS
COMMUNITY
End: 2018-03-05 | Stop reason: SDUPTHER

## 2017-08-01 RX ORDER — CHOLECALCIFEROL (VITAMIN D3) 125 MCG
CAPSULE ORAL EVERY MORNING
COMMUNITY
End: 2017-08-22

## 2017-08-01 RX ORDER — MAGNESIUM OXIDE 400 MG/1
400 TABLET ORAL 2 TIMES DAILY
COMMUNITY
End: 2017-09-26 | Stop reason: SDUPTHER

## 2017-08-01 RX ORDER — POTASSIUM CHLORIDE 750 MG/1
10 CAPSULE, EXTENDED RELEASE ORAL DAILY
Status: ON HOLD | COMMUNITY
End: 2018-11-30 | Stop reason: HOSPADM

## 2017-08-01 RX ORDER — ALLOPURINOL 100 MG/1
100 TABLET ORAL DAILY
COMMUNITY
End: 2017-08-24 | Stop reason: SDUPTHER

## 2017-08-01 RX ORDER — LEVOTHYROXINE SODIUM 0.05 MG/1
50 TABLET ORAL DAILY
COMMUNITY
End: 2018-02-16 | Stop reason: SDUPTHER

## 2017-08-01 RX ORDER — ACETAMINOPHEN 325 MG/1
650 TABLET ORAL EVERY 4 HOURS PRN
COMMUNITY
End: 2018-10-09 | Stop reason: ALTCHOICE

## 2017-08-01 RX ORDER — INSULIN GLARGINE 100 [IU]/ML
15 INJECTION, SOLUTION SUBCUTANEOUS NIGHTLY
COMMUNITY
End: 2018-06-17

## 2017-08-02 LAB
ANION GAP SERPL CALCULATED.3IONS-SCNC: 13 MEQ/L (ref 7–13)
BUN BLDV-MCNC: 10 MG/DL (ref 8–23)
CALCIUM SERPL-MCNC: 8.4 MG/DL (ref 8.6–10.2)
CHLORIDE BLD-SCNC: 99 MEQ/L (ref 98–107)
CO2: 29 MEQ/L (ref 22–29)
CREAT SERPL-MCNC: 0.69 MG/DL (ref 0.5–0.9)
GFR AFRICAN AMERICAN: >60
GFR NON-AFRICAN AMERICAN: >60
GLUCOSE BLD-MCNC: 75 MG/DL (ref 74–109)
HCT VFR BLD CALC: 31.6 % (ref 37–47)
HEMOGLOBIN: 10.5 G/DL (ref 12–16)
MCH RBC QN AUTO: 29.1 PG (ref 27–31.3)
MCHC RBC AUTO-ENTMCNC: 33.1 % (ref 33–37)
MCV RBC AUTO: 87.7 FL (ref 82–100)
PDW BLD-RTO: 15.3 % (ref 11.5–14.5)
PLATELET # BLD: 195 K/UL (ref 130–400)
POTASSIUM SERPL-SCNC: 3.8 MEQ/L (ref 3.5–5.1)
RBC # BLD: 3.61 M/UL (ref 4.2–5.4)
SODIUM BLD-SCNC: 141 MEQ/L (ref 132–144)
WBC # BLD: 7.6 K/UL (ref 4.8–10.8)

## 2017-08-08 ENCOUNTER — OFFICE VISIT (OUTPATIENT)
Dept: GERIATRIC MEDICINE | Age: 82
End: 2017-08-08

## 2017-08-08 VITALS
HEART RATE: 62 BPM | SYSTOLIC BLOOD PRESSURE: 101 MMHG | TEMPERATURE: 98 F | BODY MASS INDEX: 34.78 KG/M2 | OXYGEN SATURATION: 98 % | RESPIRATION RATE: 18 BRPM | WEIGHT: 189 LBS | DIASTOLIC BLOOD PRESSURE: 56 MMHG | HEIGHT: 62 IN

## 2017-08-08 DIAGNOSIS — I48.20 CHRONIC ATRIAL FIBRILLATION (HCC): ICD-10-CM

## 2017-08-08 DIAGNOSIS — R53.1 GENERALIZED WEAKNESS: Primary | ICD-10-CM

## 2017-08-08 DIAGNOSIS — E11.9 TYPE 2 DIABETES, HBA1C GOAL < 8% (HCC): ICD-10-CM

## 2017-08-08 LAB
EKG ATRIAL RATE: 102 BPM
EKG Q-T INTERVAL: 388 MS
EKG QRS DURATION: 80 MS
EKG QTC CALCULATION (BAZETT): 343 MS
EKG R AXIS: -1 DEGREES
EKG T AXIS: -17 DEGREES
EKG VENTRICULAR RATE: 47 BPM

## 2017-08-08 PROCEDURE — 99308 SBSQ NF CARE LOW MDM 20: CPT | Performed by: NURSE PRACTITIONER

## 2017-08-08 PROCEDURE — 1123F ACP DISCUSS/DSCN MKR DOCD: CPT | Performed by: NURSE PRACTITIONER

## 2017-08-08 ASSESSMENT — ENCOUNTER SYMPTOMS
COUGH: 1
BACK PAIN: 0
ABDOMINAL PAIN: 0
CONSTIPATION: 0
SHORTNESS OF BREATH: 0
WHEEZING: 0

## 2017-08-15 ENCOUNTER — OFFICE VISIT (OUTPATIENT)
Dept: GERIATRIC MEDICINE | Age: 82
End: 2017-08-15

## 2017-08-15 DIAGNOSIS — E11.9 DM TYPE 2, GOAL HBA1C 7%-8% (HCC): ICD-10-CM

## 2017-08-15 DIAGNOSIS — D64.9 ANEMIA, UNSPECIFIED: ICD-10-CM

## 2017-08-15 DIAGNOSIS — E55.9 VITAMIN D DEFICIENCY: ICD-10-CM

## 2017-08-15 DIAGNOSIS — R26.81 UNSTEADY GAIT: Primary | ICD-10-CM

## 2017-08-15 LAB
ANION GAP SERPL CALCULATED.3IONS-SCNC: 14 MEQ/L (ref 7–13)
BUN BLDV-MCNC: 18 MG/DL (ref 8–23)
CALCIUM SERPL-MCNC: 8.3 MG/DL (ref 8.6–10.2)
CHLORIDE BLD-SCNC: 97 MEQ/L (ref 98–107)
CO2: 29 MEQ/L (ref 22–29)
CREAT SERPL-MCNC: 0.69 MG/DL (ref 0.5–0.9)
GFR AFRICAN AMERICAN: >60
GFR NON-AFRICAN AMERICAN: >60
GLUCOSE BLD-MCNC: 91 MG/DL (ref 74–109)
HCT VFR BLD CALC: 31.8 % (ref 37–47)
HEMOGLOBIN: 10.5 G/DL (ref 12–16)
MCH RBC QN AUTO: 29.5 PG (ref 27–31.3)
MCHC RBC AUTO-ENTMCNC: 33 % (ref 33–37)
MCV RBC AUTO: 89.4 FL (ref 82–100)
PDW BLD-RTO: 15.5 % (ref 11.5–14.5)
PLATELET # BLD: 143 K/UL (ref 130–400)
POTASSIUM SERPL-SCNC: 4 MEQ/L (ref 3.5–5.1)
RBC # BLD: 3.56 M/UL (ref 4.2–5.4)
SODIUM BLD-SCNC: 140 MEQ/L (ref 132–144)
WBC # BLD: 7.6 K/UL (ref 4.8–10.8)

## 2017-08-15 PROCEDURE — 1123F ACP DISCUSS/DSCN MKR DOCD: CPT | Performed by: NURSE PRACTITIONER

## 2017-08-15 PROCEDURE — 99309 SBSQ NF CARE MODERATE MDM 30: CPT | Performed by: NURSE PRACTITIONER

## 2017-08-15 ASSESSMENT — ENCOUNTER SYMPTOMS
COUGH: 1
SHORTNESS OF BREATH: 0
BACK PAIN: 0
CONSTIPATION: 0
ABDOMINAL PAIN: 0
WHEEZING: 0

## 2017-08-16 VITALS
TEMPERATURE: 98.2 F | RESPIRATION RATE: 18 BRPM | HEART RATE: 64 BPM | DIASTOLIC BLOOD PRESSURE: 64 MMHG | HEIGHT: 62 IN | BODY MASS INDEX: 34.78 KG/M2 | SYSTOLIC BLOOD PRESSURE: 112 MMHG | OXYGEN SATURATION: 97 % | WEIGHT: 189 LBS

## 2017-08-16 LAB
ANION GAP SERPL CALCULATED.3IONS-SCNC: 14 MEQ/L (ref 7–13)
BUN BLDV-MCNC: 21 MG/DL (ref 8–23)
CALCIUM SERPL-MCNC: 8.3 MG/DL (ref 8.6–10.2)
CHLORIDE BLD-SCNC: 98 MEQ/L (ref 98–107)
CO2: 29 MEQ/L (ref 22–29)
CREAT SERPL-MCNC: 0.73 MG/DL (ref 0.5–0.9)
FERRITIN: 59.4 NG/ML (ref 13–150)
FOLATE: 7.9 NG/ML (ref 7.3–26.1)
GFR AFRICAN AMERICAN: >60
GFR NON-AFRICAN AMERICAN: >60
GLUCOSE BLD-MCNC: 68 MG/DL (ref 74–109)
HCT VFR BLD CALC: 33.1 % (ref 37–47)
HEMOGLOBIN: 10.6 G/DL (ref 12–16)
IRON SATURATION: 17 % (ref 11–46)
IRON: 44 UG/DL (ref 37–145)
MCH RBC QN AUTO: 29.1 PG (ref 27–31.3)
MCHC RBC AUTO-ENTMCNC: 32 % (ref 33–37)
MCV RBC AUTO: 90.9 FL (ref 82–100)
PDW BLD-RTO: 15.9 % (ref 11.5–14.5)
PLATELET # BLD: 165 K/UL (ref 130–400)
POTASSIUM SERPL-SCNC: 3.7 MEQ/L (ref 3.5–5.1)
RBC # BLD: 3.64 M/UL (ref 4.2–5.4)
RETICULOCYTE ABSOLUTE COUNT: 0.06 M/CUMM (ref 0.02–0.11)
RETICULOCYTE COUNT PCT: 1.5 % (ref 0.6–2.2)
SODIUM BLD-SCNC: 141 MEQ/L (ref 132–144)
TOTAL IRON BINDING CAPACITY: 258 UG/DL (ref 178–450)
VITAMIN B-12: 435 PG/ML (ref 211–946)
VITAMIN D 25-HYDROXY: 17.9 NG/ML (ref 30–100)
WBC # BLD: 7.2 K/UL (ref 4.8–10.8)

## 2017-08-22 ENCOUNTER — OFFICE VISIT (OUTPATIENT)
Dept: GERIATRIC MEDICINE | Age: 82
End: 2017-08-22

## 2017-08-22 VITALS
DIASTOLIC BLOOD PRESSURE: 82 MMHG | SYSTOLIC BLOOD PRESSURE: 133 MMHG | HEART RATE: 68 BPM | TEMPERATURE: 98.2 F | OXYGEN SATURATION: 97 % | RESPIRATION RATE: 18 BRPM

## 2017-08-22 DIAGNOSIS — E55.9 VITAMIN D DEFICIENCY: ICD-10-CM

## 2017-08-22 DIAGNOSIS — F03.90 DEMENTIA WITHOUT BEHAVIORAL DISTURBANCE, UNSPECIFIED DEMENTIA TYPE: ICD-10-CM

## 2017-08-22 DIAGNOSIS — I48.20 CHRONIC ATRIAL FIBRILLATION (HCC): ICD-10-CM

## 2017-08-22 DIAGNOSIS — R53.1 GENERALIZED WEAKNESS: Primary | ICD-10-CM

## 2017-08-22 PROCEDURE — 99316 NF DSCHRG MGMT 30 MIN+: CPT | Performed by: NURSE PRACTITIONER

## 2017-08-22 RX ORDER — ERGOCALCIFEROL 1.25 MG/1
50000 CAPSULE ORAL WEEKLY
COMMUNITY
Start: 2017-08-22 | End: 2017-10-17

## 2017-08-22 ASSESSMENT — ENCOUNTER SYMPTOMS
CONSTIPATION: 0
SHORTNESS OF BREATH: 0
COUGH: 0
WHEEZING: 0
BACK PAIN: 0
ABDOMINAL PAIN: 0

## 2017-09-01 DIAGNOSIS — L08.9 SUPERFICIAL BACTERIAL INFECTION OF SKIN: ICD-10-CM

## 2017-09-01 DIAGNOSIS — B96.89 SUPERFICIAL BACTERIAL INFECTION OF SKIN: ICD-10-CM

## 2017-09-01 DIAGNOSIS — L02.413 ABSCESS OF ARM, RIGHT: ICD-10-CM

## 2017-09-03 LAB
GRAM STAIN RESULT: ABNORMAL
ORGANISM: ABNORMAL
WOUND/ABSCESS: ABNORMAL

## 2018-02-15 ENCOUNTER — OFFICE VISIT (OUTPATIENT)
Dept: FAMILY MEDICINE CLINIC | Age: 83
End: 2018-02-15
Payer: MEDICARE

## 2018-02-15 VITALS
BODY MASS INDEX: 39.08 KG/M2 | OXYGEN SATURATION: 98 % | HEIGHT: 62 IN | HEART RATE: 84 BPM | DIASTOLIC BLOOD PRESSURE: 68 MMHG | WEIGHT: 212.4 LBS | RESPIRATION RATE: 16 BRPM | TEMPERATURE: 97.4 F | SYSTOLIC BLOOD PRESSURE: 102 MMHG

## 2018-02-15 DIAGNOSIS — E78.2 MIXED HYPERLIPIDEMIA: ICD-10-CM

## 2018-02-15 DIAGNOSIS — M25.572 CHRONIC PAIN OF BOTH ANKLES: ICD-10-CM

## 2018-02-15 DIAGNOSIS — I10 ESSENTIAL HYPERTENSION: ICD-10-CM

## 2018-02-15 DIAGNOSIS — G89.29 CHRONIC PAIN OF BOTH ANKLES: ICD-10-CM

## 2018-02-15 DIAGNOSIS — I48.20 CHRONIC A-FIB (HCC): ICD-10-CM

## 2018-02-15 DIAGNOSIS — M25.571 CHRONIC PAIN OF BOTH ANKLES: ICD-10-CM

## 2018-02-15 DIAGNOSIS — E11.9 TYPE 2 DIABETES MELLITUS WITHOUT COMPLICATION, WITH LONG-TERM CURRENT USE OF INSULIN (HCC): Primary | ICD-10-CM

## 2018-02-15 DIAGNOSIS — E03.9 HYPOTHYROIDISM, UNSPECIFIED TYPE: ICD-10-CM

## 2018-02-15 DIAGNOSIS — Z79.4 TYPE 2 DIABETES MELLITUS WITHOUT COMPLICATION, WITH LONG-TERM CURRENT USE OF INSULIN (HCC): Primary | ICD-10-CM

## 2018-02-15 DIAGNOSIS — Z79.4 TYPE 2 DIABETES MELLITUS WITHOUT COMPLICATION, WITH LONG-TERM CURRENT USE OF INSULIN (HCC): ICD-10-CM

## 2018-02-15 DIAGNOSIS — E11.9 TYPE 2 DIABETES MELLITUS WITHOUT COMPLICATION, WITH LONG-TERM CURRENT USE OF INSULIN (HCC): ICD-10-CM

## 2018-02-15 LAB
ALBUMIN SERPL-MCNC: 3.8 G/DL (ref 3.9–4.9)
ALP BLD-CCNC: 72 U/L (ref 40–130)
ALT SERPL-CCNC: 6 U/L (ref 0–33)
ANION GAP SERPL CALCULATED.3IONS-SCNC: 15 MEQ/L (ref 7–13)
AST SERPL-CCNC: 21 U/L (ref 0–35)
BASOPHILS ABSOLUTE: 0 K/UL (ref 0–0.2)
BASOPHILS RELATIVE PERCENT: 0.5 %
BILIRUB SERPL-MCNC: 0.5 MG/DL (ref 0–1.2)
BUN BLDV-MCNC: 32 MG/DL (ref 8–23)
CALCIUM SERPL-MCNC: 9.1 MG/DL (ref 8.6–10.2)
CHLORIDE BLD-SCNC: 98 MEQ/L (ref 98–107)
CHOLESTEROL, TOTAL: 172 MG/DL (ref 0–199)
CO2: 28 MEQ/L (ref 22–29)
CREAT SERPL-MCNC: 0.74 MG/DL (ref 0.5–0.9)
EOSINOPHILS ABSOLUTE: 0.1 K/UL (ref 0–0.7)
EOSINOPHILS RELATIVE PERCENT: 1.6 %
GFR AFRICAN AMERICAN: >60
GFR NON-AFRICAN AMERICAN: >60
GLOBULIN: 2.8 G/DL (ref 2.3–3.5)
GLUCOSE BLD-MCNC: 114 MG/DL (ref 74–109)
HBA1C MFR BLD: 6.2 % (ref 4.8–5.9)
HCT VFR BLD CALC: 39.3 % (ref 37–47)
HDLC SERPL-MCNC: 36 MG/DL (ref 40–59)
HEMOGLOBIN: 12.8 G/DL (ref 12–16)
LDL CHOLESTEROL CALCULATED: 117 MG/DL (ref 0–129)
LYMPHOCYTES ABSOLUTE: 1.8 K/UL (ref 1–4.8)
LYMPHOCYTES RELATIVE PERCENT: 21.8 %
MCH RBC QN AUTO: 27.5 PG (ref 27–31.3)
MCHC RBC AUTO-ENTMCNC: 32.7 % (ref 33–37)
MCV RBC AUTO: 84.2 FL (ref 82–100)
MONOCYTES ABSOLUTE: 0.5 K/UL (ref 0.2–0.8)
MONOCYTES RELATIVE PERCENT: 6.2 %
NEUTROPHILS ABSOLUTE: 5.8 K/UL (ref 1.4–6.5)
NEUTROPHILS RELATIVE PERCENT: 69.9 %
PDW BLD-RTO: 16.5 % (ref 11.5–14.5)
PLATELET # BLD: 139 K/UL (ref 130–400)
POTASSIUM SERPL-SCNC: 4.4 MEQ/L (ref 3.5–5.1)
RBC # BLD: 4.67 M/UL (ref 4.2–5.4)
SODIUM BLD-SCNC: 141 MEQ/L (ref 132–144)
T4 FREE: 0.7 NG/DL (ref 0.93–1.7)
TOTAL PROTEIN: 6.6 G/DL (ref 6.4–8.1)
TRIGL SERPL-MCNC: 97 MG/DL (ref 0–200)
TSH REFLEX: 12.03 UIU/ML (ref 0.27–4.2)
URIC ACID, SERUM: 5.4 MG/DL (ref 2.4–5.7)
WBC # BLD: 8.3 K/UL (ref 4.8–10.8)

## 2018-02-15 PROCEDURE — 99214 OFFICE O/P EST MOD 30 MIN: CPT | Performed by: PHYSICIAN ASSISTANT

## 2018-02-15 ASSESSMENT — ENCOUNTER SYMPTOMS
ABDOMINAL DISTENTION: 0
CHEST TIGHTNESS: 0
WHEEZING: 0
BLOOD IN STOOL: 0
COUGH: 0
COLOR CHANGE: 0
SHORTNESS OF BREATH: 0
ABDOMINAL PAIN: 0
CONSTIPATION: 0

## 2018-02-15 NOTE — PROGRESS NOTES
unspecified type diabetes mellitus without mention of complication, not stated as uncontrolled      Past Surgical History:   Procedure Laterality Date    FRACTURE SURGERY       Social History     Social History    Marital status:      Spouse name: N/A    Number of children: N/A    Years of education: N/A     Occupational History    Not on file. Social History Main Topics    Smoking status: Former Smoker    Smokeless tobacco: Never Used    Alcohol use No    Drug use: No    Sexual activity: No     Other Topics Concern    Not on file     Social History Narrative    ** Merged History Encounter **          Family History   Problem Relation Age of Onset    Stroke Mother     Cancer Father     Heart Disease Brother     Heart Attack Brother      No Known Allergies  Current Outpatient Prescriptions   Medication Sig Dispense Refill    XARELTO 20 MG TABS tablet Take 1 tablet by mouth Daily with supper 30 tablet 2    magnesium oxide (MAG-OX) 400 (241.3 Mg) MG TABS tablet TAKE 1 TABLET BY MOUTH TWICE DAILY 30 tablet 2    Cholecalciferol (VITAMIN D3) 54293 units CAPS Take 1 capsule by mouth weekly. 12 capsule 0    furosemide (LASIX) 40 MG tablet Take 1 tablet by mouth See Admin Instructions SUN,TUE,THU,SAT. GIVE 60 MG BY MOUTH EVERY M,F,F 60 tablet 2    acetaminophen (TYLENOL) 325 MG tablet Take 650 mg by mouth every 4 hours as needed for Pain      Skin Protectants, Misc. (EUCERIN) cream Apply topically as needed for Dry Skin Apply topically as needed.       insulin glargine (LANTUS) 100 UNIT/ML injection vial Inject 15 Units into the skin nightly       levothyroxine (SYNTHROID) 50 MCG tablet Take 50 mcg by mouth Daily      losartan (COZAAR) 25 MG tablet Take 25 mg by mouth See Admin Instructions M,W,F      potassium chloride (MICRO-K) 10 MEQ extended release capsule Take 10 mEq by mouth daily      vitamin B-12 (CYANOCOBALAMIN) 1000 MCG tablet Take 1,000 mcg by mouth daily       No current facility-administered medications for this visit. PMH, Surgical Hx, Family Hx, and Social Hx reviewed and updated. Health Maintenance reviewed. Objective  Vitals:    02/15/18 1435   BP: 102/68   Site: Left Arm   Position: Sitting   Cuff Size: Large Adult   Pulse: 84   Resp: 16   Temp: 97.4 °F (36.3 °C)   TempSrc: Temporal   SpO2: 98%   Weight: 212 lb 6.4 oz (96.3 kg)   Height: 5' 2\" (1.575 m)     BP Readings from Last 3 Encounters:   02/15/18 102/68   11/09/17 110/68   09/01/17 112/68     Wt Readings from Last 3 Encounters:   02/15/18 212 lb 6.4 oz (96.3 kg)   11/09/17 207 lb 9.6 oz (94.2 kg)   09/01/17 189 lb (85.7 kg)     Physical Exam   Constitutional: She is oriented to person, place, and time. She appears well-developed and well-nourished. No distress. Sitting comfortably in wheelchair; daughter with patient. HENT:   Head: Normocephalic and atraumatic. Right Ear: Hearing and external ear normal.   Left Ear: Hearing and external ear normal.   Nose: Nose normal.   Mouth/Throat: Oropharynx is clear and moist and mucous membranes are normal. She does not have dentures. Abnormal dentition. No oropharyngeal exudate. Eyes: Conjunctivae are normal.   Neck: Normal range of motion. Cardiovascular: Normal rate, regular rhythm and normal heart sounds. Exam reveals no gallop and no friction rub. No murmur heard. No murmurs, regular rhythm today. Pulmonary/Chest: Effort normal and breath sounds normal. No respiratory distress. She has no wheezes. She has no rales. Abdominal: Soft. She exhibits no distension and no mass. There is no tenderness. There is no rebound and no guarding. Obese abdomen   Musculoskeletal: Normal range of motion. Neurological: She is alert and oriented to person, place, and time. Skin: Skin is warm and dry. No rash noted. She is not diaphoretic. No erythema. There is pallor. Psychiatric: She has a normal mood and affect.  Her behavior is normal. Judgment and thought content normal.   Doing well. Living with son. No concerns today. Assessment & Plan   1. Type 2 diabetes mellitus without complication, with long-term current use of insulin ([facility])  CBC Auto Differential    Comprehensive Metabolic Panel    Hemoglobin A1C   2. Chronic a-fib ([address]     3. Essential hypertension  CBC Auto Differential    Comprehensive Metabolic Panel   4. Hypothyroidism, unspecified type  TSH with Reflex   5. Mixed hyperlipidemia  Lipid Panel   6. Chronic pain of both ankles  Uric Acid   -Due for routine labs today.  -Stable on current medications.  -Should follow up with cardiology; this was discussed today.   -Follow up with me in 4 months.  -Return to office sooner if there is any concern. -EKG at next OV. Orders Placed This Encounter   Procedures    CBC Auto Differential     Standing Status:   Future     Standing Expiration Date:   2/15/2019    Comprehensive Metabolic Panel     Standing Status:   Future     Standing Expiration Date:   2/15/2019    Lipid Panel     Standing Status:   Future     Standing Expiration Date:   2/15/2019     Order Specific Question:   Is Patient Fasting?/# of Hours     Answer:   8+ hours    TSH with Reflex     Standing Status:   Future     Standing Expiration Date:   2/15/2019    Hemoglobin A1C     Standing Status:   Future     Standing Expiration Date:   2/15/2019    Uric Acid     Standing Status:   Future     Standing Expiration Date:   2/15/2019     No orders of the defined types were placed in this encounter. Medications Discontinued During This Encounter   Medication Reason    KLOR-CON M10 10 MEQ extended release tablet Therapy completed    allopurinol (ZYLOPRIM) 100 MG tablet Therapy completed     No Follow-up on file. Reviewed with the patient: current clinical status, medications, activities and diet.      Side effects, adverse effects of the medication prescribed today, as well as treatment plan/ rationale and result expectations have been

## 2018-02-16 RX ORDER — LEVOTHYROXINE SODIUM 0.07 MG/1
75 TABLET ORAL DAILY
Qty: 90 TABLET | Refills: 1 | Status: SHIPPED | OUTPATIENT
Start: 2018-02-16 | End: 2018-10-09 | Stop reason: SDUPTHER

## 2018-04-18 RX ORDER — SYRINGE AND NEEDLE,INSULIN,1ML 30 GX5/16"
SYRINGE, EMPTY DISPOSABLE MISCELLANEOUS
Qty: 100 EACH | Refills: 3 | Status: ON HOLD | OUTPATIENT
Start: 2018-04-18 | End: 2019-01-09 | Stop reason: ALTCHOICE

## 2018-06-15 NOTE — TELEPHONE ENCOUNTER
Patient needs refills on her Lantus. Pharmacy told patient that her insurance isn't covering Lantus and that she needed to contact you to switch the Lantus for a different insulin. Only using 15 units nightly and she is completely out of the insulin. Please advice and thanks!

## 2018-06-19 RX ORDER — PEN NEEDLE, DIABETIC 31 GX5/16"
1 NEEDLE, DISPOSABLE MISCELLANEOUS DAILY
Qty: 100 EACH | Refills: 3 | Status: SHIPPED | OUTPATIENT
Start: 2018-06-19 | End: 2020-01-13

## 2018-06-19 NOTE — TELEPHONE ENCOUNTER
[facility] called and states Meseret Craig was sent to the pharmacy but no pen needles were sent. Please approve or deny this refill request. The order is pended. Thank you.     LOV 2/15/2018    Next Visit Date:  Future Appointments  Date Time Provider [facility]   2018 2:30 PM [facility] EMERGENCY [facility] AT [place]       Requested Prescriptions     Pending Prescriptions Disp Refills    Insulin Pen Needle (PEN NEEDLES 31GX5/16\") 31G X 8 MM MISC 100 each 3     Si each by Does not apply route daily

## 2018-07-02 ENCOUNTER — OFFICE VISIT (OUTPATIENT)
Dept: FAMILY MEDICINE CLINIC | Age: 83
End: 2018-07-02
Payer: MEDICARE

## 2018-07-02 VITALS
RESPIRATION RATE: 18 BRPM | HEART RATE: 86 BPM | DIASTOLIC BLOOD PRESSURE: 82 MMHG | OXYGEN SATURATION: 97 % | TEMPERATURE: 97.2 F | BODY MASS INDEX: 40.45 KG/M2 | HEIGHT: 62 IN | WEIGHT: 219.8 LBS | SYSTOLIC BLOOD PRESSURE: 110 MMHG

## 2018-07-02 DIAGNOSIS — E03.9 HYPOTHYROIDISM, UNSPECIFIED TYPE: ICD-10-CM

## 2018-07-02 DIAGNOSIS — E66.01 MORBID OBESITY WITH BMI OF 40.0-44.9, ADULT (HCC): ICD-10-CM

## 2018-07-02 DIAGNOSIS — E11.9 CONTROLLED TYPE 2 DIABETES MELLITUS WITHOUT COMPLICATION, WITH LONG-TERM CURRENT USE OF INSULIN (HCC): Primary | ICD-10-CM

## 2018-07-02 DIAGNOSIS — I48.20 CHRONIC A-FIB (HCC): ICD-10-CM

## 2018-07-02 DIAGNOSIS — Z79.4 CONTROLLED TYPE 2 DIABETES MELLITUS WITHOUT COMPLICATION, WITH LONG-TERM CURRENT USE OF INSULIN (HCC): Primary | ICD-10-CM

## 2018-07-02 PROBLEM — N39.0 UTI (URINARY TRACT INFECTION): Status: RESOLVED | Noted: 2017-07-26 | Resolved: 2018-07-02

## 2018-07-02 PROBLEM — R00.1 BRADYCARDIA: Status: RESOLVED | Noted: 2017-07-26 | Resolved: 2018-07-02

## 2018-07-02 PROBLEM — R79.1 SUPRATHERAPEUTIC INR: Status: RESOLVED | Noted: 2017-07-26 | Resolved: 2018-07-02

## 2018-07-02 PROBLEM — R53.1 WEAKNESS: Status: RESOLVED | Noted: 2017-07-26 | Resolved: 2018-07-02

## 2018-07-02 LAB
T4 FREE: 1 NG/DL (ref 0.93–1.7)
TSH REFLEX: 9.25 UIU/ML (ref 0.27–4.2)

## 2018-07-02 PROCEDURE — G8427 DOCREV CUR MEDS BY ELIG CLIN: HCPCS | Performed by: PHYSICIAN ASSISTANT

## 2018-07-02 PROCEDURE — 1090F PRES/ABSN URINE INCON ASSESS: CPT | Performed by: PHYSICIAN ASSISTANT

## 2018-07-02 PROCEDURE — G8399 PT W/DXA RESULTS DOCUMENT: HCPCS | Performed by: PHYSICIAN ASSISTANT

## 2018-07-02 PROCEDURE — 4040F PNEUMOC VAC/ADMIN/RCVD: CPT | Performed by: PHYSICIAN ASSISTANT

## 2018-07-02 PROCEDURE — 1036F TOBACCO NON-USER: CPT | Performed by: PHYSICIAN ASSISTANT

## 2018-07-02 PROCEDURE — 99214 OFFICE O/P EST MOD 30 MIN: CPT | Performed by: PHYSICIAN ASSISTANT

## 2018-07-02 PROCEDURE — 1123F ACP DISCUSS/DSCN MKR DOCD: CPT | Performed by: PHYSICIAN ASSISTANT

## 2018-07-02 PROCEDURE — G8417 CALC BMI ABV UP PARAM F/U: HCPCS | Performed by: PHYSICIAN ASSISTANT

## 2018-07-02 ASSESSMENT — ENCOUNTER SYMPTOMS
ABDOMINAL DISTENTION: 0
COUGH: 0
ABDOMINAL PAIN: 0
TROUBLE SWALLOWING: 0
NAUSEA: 0
WHEEZING: 0
CONSTIPATION: 0
SINUS PRESSURE: 0
BLOOD IN STOOL: 0
RECTAL PAIN: 0
CHEST TIGHTNESS: 0
SORE THROAT: 0
DIARRHEA: 0
VOMITING: 0
SHORTNESS OF BREATH: 0
COLOR CHANGE: 0

## 2018-07-02 ASSESSMENT — PATIENT HEALTH QUESTIONNAIRE - PHQ9
1. LITTLE INTEREST OR PLEASURE IN DOING THINGS: 0
2. FEELING DOWN, DEPRESSED OR HOPELESS: 0
SUM OF ALL RESPONSES TO PHQ9 QUESTIONS 1 & 2: 0
SUM OF ALL RESPONSES TO PHQ QUESTIONS 1-9: 0

## 2018-07-02 NOTE — PROGRESS NOTES
Neurological: Negative for dizziness, tremors, syncope, facial asymmetry, speech difficulty, weakness, light-headedness, numbness and headaches. Hematological: Negative for adenopathy. Bruises/bleeds easily. Psychiatric/Behavioral: Negative for agitation, confusion, decreased concentration, dysphoric mood and sleep disturbance. The patient is not nervous/anxious. Past Medical History:   Diagnosis Date    Anticoagulant long-term use     Arthritis     Atrial fibrillation (HCC)     Hypertension     Hypothyroidism     Obesity     Type II or unspecified type diabetes mellitus without mention of complication, not stated as uncontrolled      Past Surgical History:   Procedure Laterality Date    FRACTURE SURGERY       Social History     Social History    Marital status:      Spouse name: N/A    Number of children: N/A    Years of education: N/A     Occupational History    Not on file. Social History Main Topics    Smoking status: Former Smoker    Smokeless tobacco: Never Used    Alcohol use No    Drug use: No    Sexual activity: No     Other Topics Concern    Not on file     Social History Narrative    ** Merged History Encounter **          Family History   Problem Relation Age of Onset    Stroke Mother     Cancer Father     Heart Disease Brother     Heart Attack Brother      No Known Allergies  Current Outpatient Prescriptions   Medication Sig Dispense Refill    Insulin Pen Needle (PEN NEEDLES 31GX5/16\") 31G X 8 MM MISC 1 each by Does not apply route daily 100 each 3    furosemide (LASIX) 40 MG tablet TAKE 1 & 1/2 (ONE AND ONE-HALF) TABLETS BY MOUTH EVERY MONDAY, WEDNESDAY and FRIDAY and TAKE 1 TABLET EVERY tuesday, THURSDAY, SATURDAY and  60 tablet 2    insulin glargine (BASAGLAR KWIKPEN) 100 UNIT/ML injection pen Inject 15 Units into the skin nightly 5 pen 3    TRUEPLUS INSULIN SYRINGE 30G X 5/16\" 1 ML MISC Use twice daily.  100 each 3    losartan (COZAAR) 25 MG tablet are normal.   Neck: Normal range of motion. Neck supple. Cardiovascular: Normal rate and normal heart sounds. An irregularly irregular rhythm present. No murmur heard. Afib--chronic. Pulmonary/Chest: Effort normal and breath sounds normal. No respiratory distress. She has no wheezes. She has no rales. Musculoskeletal: Normal range of motion. She exhibits no edema or tenderness. Neurological: She is alert and oriented to person, place, and time. Ataxic, slow-moving gait noted. Skin: Skin is warm and dry. No rash noted. She is not diaphoretic. No erythema. Psychiatric: She has a normal mood and affect. Her behavior is normal. Judgment and thought content normal.   In a good mood. Feeling well, overall. No complaints or concerns from patient or daughter. Vitals reviewed. Assessment & Plan    Diagnosis Orders   1. Controlled type 2 diabetes mellitus without complication, with long-term current use of insulin ([address]     2. Morbid obesity with BMI of 40.0-44.9, adult ([address]      -Weigth gain noted. -Patient admits to increased snacking/intake. -Recommend reducing portion sizes, limit sweets. 3. Hypothyroidism, unspecified type  TSH with Reflex    -Recheck TSH today. 4. Chronic a-fib ([address]      -Patient strongly encouraged to follow up with Dr. Percy Zamudio and [facility]     -Follow up with me in 3 months.  -Call with any questions or concerns.   -No other questions today. Orders Placed This Encounter   Procedures    TSH with Reflex     Standing Status:   Future     Standing Expiration Date:   7/2/2019     No orders of the defined types were placed in this encounter. There are no discontinued medications. Return in about 3 months (around 10/2/2018) for routine follow up . Reviewed with the patient: current clinical status, medications, activities and diet.      Side effects, adverse effects of the medication prescribed today, as well as treatment plan/ rationale and result expectations have been discussed with the patient who expresses understanding and desires to proceed. Close follow up to evaluate treatment results and for coordination of care. I have reviewed the patient's medical history in detail and updated the computerized patient record.     Marilyn العلي PA-C

## 2018-10-09 ENCOUNTER — OFFICE VISIT (OUTPATIENT)
Dept: FAMILY MEDICINE CLINIC | Age: 83
End: 2018-10-09
Payer: MEDICARE

## 2018-10-09 VITALS
RESPIRATION RATE: 16 BRPM | TEMPERATURE: 97.5 F | DIASTOLIC BLOOD PRESSURE: 68 MMHG | HEIGHT: 62 IN | WEIGHT: 208 LBS | BODY MASS INDEX: 38.28 KG/M2 | SYSTOLIC BLOOD PRESSURE: 108 MMHG | HEART RATE: 81 BPM | OXYGEN SATURATION: 98 %

## 2018-10-09 DIAGNOSIS — E11.9 TYPE 2 DIABETES MELLITUS WITHOUT COMPLICATION, WITH LONG-TERM CURRENT USE OF INSULIN (HCC): ICD-10-CM

## 2018-10-09 DIAGNOSIS — E03.9 HYPOTHYROIDISM, UNSPECIFIED TYPE: ICD-10-CM

## 2018-10-09 DIAGNOSIS — I10 ESSENTIAL HYPERTENSION: ICD-10-CM

## 2018-10-09 DIAGNOSIS — Z23 FLU VACCINE NEED: ICD-10-CM

## 2018-10-09 DIAGNOSIS — Z79.4 TYPE 2 DIABETES MELLITUS WITHOUT COMPLICATION, WITH LONG-TERM CURRENT USE OF INSULIN (HCC): ICD-10-CM

## 2018-10-09 DIAGNOSIS — I48.20 CHRONIC A-FIB (HCC): Chronic | ICD-10-CM

## 2018-10-09 DIAGNOSIS — E55.9 VITAMIN D DEFICIENCY: ICD-10-CM

## 2018-10-09 DIAGNOSIS — E66.01 CLASS 2 SEVERE OBESITY WITH SERIOUS COMORBIDITY AND BODY MASS INDEX (BMI) OF 38.0 TO 38.9 IN ADULT, UNSPECIFIED OBESITY TYPE (HCC): ICD-10-CM

## 2018-10-09 DIAGNOSIS — I48.20 CHRONIC A-FIB (HCC): Primary | Chronic | ICD-10-CM

## 2018-10-09 LAB
ALBUMIN SERPL-MCNC: 3.7 G/DL (ref 3.9–4.9)
ALP BLD-CCNC: 76 U/L (ref 40–130)
ALT SERPL-CCNC: <5 U/L (ref 0–33)
ANION GAP SERPL CALCULATED.3IONS-SCNC: 13 MEQ/L (ref 7–13)
AST SERPL-CCNC: 16 U/L (ref 0–35)
BASOPHILS ABSOLUTE: 0 K/UL (ref 0–0.2)
BASOPHILS RELATIVE PERCENT: 0.5 %
BILIRUB SERPL-MCNC: 0.9 MG/DL (ref 0–1.2)
BUN BLDV-MCNC: 15 MG/DL (ref 8–23)
CALCIUM SERPL-MCNC: 9.2 MG/DL (ref 8.6–10.2)
CHLORIDE BLD-SCNC: 97 MEQ/L (ref 98–107)
CO2: 32 MEQ/L (ref 22–29)
CREAT SERPL-MCNC: 0.58 MG/DL (ref 0.5–0.9)
EOSINOPHILS ABSOLUTE: 0.1 K/UL (ref 0–0.7)
EOSINOPHILS RELATIVE PERCENT: 1.2 %
GFR AFRICAN AMERICAN: >60
GFR NON-AFRICAN AMERICAN: >60
GLOBULIN: 2.7 G/DL (ref 2.3–3.5)
GLUCOSE BLD-MCNC: 136 MG/DL (ref 74–109)
HBA1C MFR BLD: 6.3 % (ref 4.8–5.9)
HCT VFR BLD CALC: 39.5 % (ref 37–47)
HEMOGLOBIN: 13.4 G/DL (ref 12–16)
LYMPHOCYTES ABSOLUTE: 1.6 K/UL (ref 1–4.8)
LYMPHOCYTES RELATIVE PERCENT: 20.2 %
MCH RBC QN AUTO: 30.5 PG (ref 27–31.3)
MCHC RBC AUTO-ENTMCNC: 33.9 % (ref 33–37)
MCV RBC AUTO: 89.7 FL (ref 82–100)
MONOCYTES ABSOLUTE: 0.5 K/UL (ref 0.2–0.8)
MONOCYTES RELATIVE PERCENT: 5.9 %
NEUTROPHILS ABSOLUTE: 5.9 K/UL (ref 1.4–6.5)
NEUTROPHILS RELATIVE PERCENT: 72.2 %
PDW BLD-RTO: 15.4 % (ref 11.5–14.5)
PLATELET # BLD: 167 K/UL (ref 130–400)
POTASSIUM SERPL-SCNC: 3.2 MEQ/L (ref 3.5–5.1)
RBC # BLD: 4.4 M/UL (ref 4.2–5.4)
SODIUM BLD-SCNC: 142 MEQ/L (ref 132–144)
TOTAL PROTEIN: 6.4 G/DL (ref 6.4–8.1)
TSH REFLEX: 2.17 UIU/ML (ref 0.27–4.2)
WBC # BLD: 8.2 K/UL (ref 4.8–10.8)

## 2018-10-09 PROCEDURE — G0008 ADMIN INFLUENZA VIRUS VAC: HCPCS | Performed by: PHYSICIAN ASSISTANT

## 2018-10-09 PROCEDURE — 1101F PT FALLS ASSESS-DOCD LE1/YR: CPT | Performed by: PHYSICIAN ASSISTANT

## 2018-10-09 PROCEDURE — 1036F TOBACCO NON-USER: CPT | Performed by: PHYSICIAN ASSISTANT

## 2018-10-09 PROCEDURE — 1090F PRES/ABSN URINE INCON ASSESS: CPT | Performed by: PHYSICIAN ASSISTANT

## 2018-10-09 PROCEDURE — G8399 PT W/DXA RESULTS DOCUMENT: HCPCS | Performed by: PHYSICIAN ASSISTANT

## 2018-10-09 PROCEDURE — 99214 OFFICE O/P EST MOD 30 MIN: CPT | Performed by: PHYSICIAN ASSISTANT

## 2018-10-09 PROCEDURE — G8427 DOCREV CUR MEDS BY ELIG CLIN: HCPCS | Performed by: PHYSICIAN ASSISTANT

## 2018-10-09 PROCEDURE — G8417 CALC BMI ABV UP PARAM F/U: HCPCS | Performed by: PHYSICIAN ASSISTANT

## 2018-10-09 PROCEDURE — 4040F PNEUMOC VAC/ADMIN/RCVD: CPT | Performed by: PHYSICIAN ASSISTANT

## 2018-10-09 PROCEDURE — 1123F ACP DISCUSS/DSCN MKR DOCD: CPT | Performed by: PHYSICIAN ASSISTANT

## 2018-10-09 PROCEDURE — G8482 FLU IMMUNIZE ORDER/ADMIN: HCPCS | Performed by: PHYSICIAN ASSISTANT

## 2018-10-09 PROCEDURE — 90662 IIV NO PRSV INCREASED AG IM: CPT | Performed by: PHYSICIAN ASSISTANT

## 2018-10-09 RX ORDER — CHOLECALCIFEROL (VITAMIN D3) 1250 MCG
CAPSULE ORAL
Qty: 12 CAPSULE | Refills: 1 | Status: SHIPPED | OUTPATIENT
Start: 2018-10-09 | End: 2019-04-19 | Stop reason: SDUPTHER

## 2018-10-09 RX ORDER — LOSARTAN POTASSIUM 25 MG/1
TABLET ORAL
Qty: 90 TABLET | Refills: 2 | Status: ON HOLD | OUTPATIENT
Start: 2018-10-09 | End: 2019-01-21

## 2018-10-09 RX ORDER — LEVOTHYROXINE SODIUM 0.07 MG/1
75 TABLET ORAL DAILY
Qty: 90 TABLET | Refills: 1 | Status: SHIPPED | OUTPATIENT
Start: 2018-10-09 | End: 2019-03-18 | Stop reason: SDUPTHER

## 2018-10-09 RX ORDER — GLUCOSAMINE HCL/CHONDROITIN SU 500-400 MG
CAPSULE ORAL
Qty: 100 STRIP | Refills: 3 | Status: SHIPPED | OUTPATIENT
Start: 2018-10-09 | End: 2019-01-22 | Stop reason: SDUPTHER

## 2018-10-09 ASSESSMENT — ENCOUNTER SYMPTOMS
DIARRHEA: 0
VOMITING: 0
CHEST TIGHTNESS: 0
CONSTIPATION: 0
SINUS PRESSURE: 0
NAUSEA: 0
SHORTNESS OF BREATH: 0
COLOR CHANGE: 0
BLOOD IN STOOL: 0
ABDOMINAL DISTENTION: 0
ABDOMINAL PAIN: 0
COUGH: 0
TROUBLE SWALLOWING: 0
WHEEZING: 0
SORE THROAT: 0
RECTAL PAIN: 0

## 2018-10-09 NOTE — PROGRESS NOTES
Vaccine Information Sheet, \"Influenza - Inactivated\"  given to [employer], or parent/legal guardian of  [employer] and verbalized understanding. Patient responses:    Have you ever had a reaction to a flu vaccine? No  Are you able to eat eggs without adverse effects? Yes and No  Do you have any current illness? No  Have you ever had Guillian Apex Syndrome? No    Flu vaccine given per order. Please see immunization tab.

## 2018-10-09 NOTE — PROGRESS NOTES
Subjective  Jenise Hernandezes, 80 y.o. female presents today with:  Chief Complaint   Patient presents with    Diabetes     follow up      HPI  Lakeisha Wells is in the office today for follow up visit. Last OV with me: 7/2/2018    Type 2 diabetes. Blood sugars have been well-controlled.  Checking at least daily-at home  Has had not extreme fluctuations with her numbers.  Denies hypoglycemic episodes. Not taking 81 mg ASA, is on xarelto 20 mg for afib. Not currently on statin therapy. She is taking ARB.    Afib. Continues with xarelto for chronic anticoagulation concerning chronic a fib.  Blood pressure is well-controlled. Has not made a follow up appointment with  Friday jeff. Mary Montoya scanned to chart was from over a year ago. Denies CP, BLE edema, SOB, excessive fatigue. Open to establishing with [id number] [employer] cardiology.       Hypothyroidism. Due to have thyroid labs rechecked. Current thyroid replacement is 75 mcg. Review of Systems   Constitutional: Positive for unexpected weight change (gain). Negative for activity change, appetite change, chills, diaphoresis, fatigue and fever. HENT: Positive for dental problem. Negative for nosebleeds, postnasal drip, sinus pressure, sore throat and trouble swallowing. Eyes: Negative for visual disturbance. Respiratory: Negative for cough, chest tightness, shortness of breath and wheezing. Cardiovascular: Negative for chest pain, palpitations and leg swelling. Gastrointestinal: Negative for abdominal distention, abdominal pain, blood in stool, constipation, diarrhea, nausea, rectal pain and vomiting. Genitourinary: Negative for dysuria and hematuria. Musculoskeletal: Positive for arthralgias and gait problem. Skin: Negative for color change, rash and wound. Neurological: Negative for dizziness, tremors, syncope, facial asymmetry, speech difficulty, weakness, light-headedness, numbness and headaches. Hematological: Negative for adenopathy.

## 2018-11-28 ENCOUNTER — APPOINTMENT (OUTPATIENT)
Dept: INTERVENTIONAL RADIOLOGY/VASCULAR | Age: 83
DRG: 871 | End: 2018-11-28
Payer: MEDICARE

## 2018-11-28 ENCOUNTER — APPOINTMENT (OUTPATIENT)
Dept: GENERAL RADIOLOGY | Age: 83
DRG: 871 | End: 2018-11-28
Payer: MEDICARE

## 2018-11-28 ENCOUNTER — HOSPITAL ENCOUNTER (INPATIENT)
Age: 83
LOS: 2 days | Discharge: HOME | DRG: 871 | End: 2018-11-30
Attending: EMERGENCY MEDICINE | Admitting: INTERNAL MEDICINE
Payer: MEDICARE

## 2018-11-28 DIAGNOSIS — R93.89 INFILTRATE NOTED ON IMAGING STUDY: ICD-10-CM

## 2018-11-28 DIAGNOSIS — D72.828 OTHER ELEVATED WHITE BLOOD CELL (WBC) COUNT: ICD-10-CM

## 2018-11-28 DIAGNOSIS — R09.02 HYPOXIA: Primary | ICD-10-CM

## 2018-11-28 DIAGNOSIS — R53.1 GENERAL WEAKNESS: ICD-10-CM

## 2018-11-28 PROBLEM — J18.9 PNEUMONIA: Status: ACTIVE | Noted: 2018-11-28

## 2018-11-28 LAB
ALBUMIN SERPL-MCNC: 3.1 G/DL (ref 3.9–4.9)
ALP BLD-CCNC: 71 U/L (ref 40–130)
ALT SERPL-CCNC: <5 U/L (ref 0–33)
ANION GAP SERPL CALCULATED.3IONS-SCNC: 18 MEQ/L (ref 7–13)
AST SERPL-CCNC: 27 U/L (ref 0–35)
BILIRUB SERPL-MCNC: 1.4 MG/DL (ref 0–1.2)
BUN BLDV-MCNC: 21 MG/DL (ref 8–23)
CALCIUM SERPL-MCNC: 9.6 MG/DL (ref 8.6–10.2)
CHLORIDE BLD-SCNC: 94 MEQ/L (ref 98–107)
CO2: 22 MEQ/L (ref 22–29)
CREAT SERPL-MCNC: 0.82 MG/DL (ref 0.5–0.9)
EKG ATRIAL RATE: 105 BPM
EKG Q-T INTERVAL: 298 MS
EKG QRS DURATION: 80 MS
EKG QTC CALCULATION (BAZETT): 397 MS
EKG R AXIS: 0 DEGREES
EKG T AXIS: -33 DEGREES
EKG VENTRICULAR RATE: 107 BPM
GFR AFRICAN AMERICAN: >60
GFR NON-AFRICAN AMERICAN: >60
GLOBULIN: 4.1 G/DL (ref 2.3–3.5)
GLUCOSE BLD-MCNC: 192 MG/DL (ref 60–115)
GLUCOSE BLD-MCNC: 214 MG/DL (ref 60–115)
GLUCOSE BLD-MCNC: 255 MG/DL (ref 74–109)
HCT VFR BLD CALC: 40.7 % (ref 37–47)
HEMOGLOBIN: 13.9 G/DL (ref 12–16)
LACTIC ACID, SEPSIS: 1.9 MMOL/L (ref 0.5–1.9)
LACTIC ACID, SEPSIS: 2.2 MMOL/L (ref 0.5–1.9)
LACTIC ACID: 3 MMOL/L (ref 0.5–2.2)
MAGNESIUM: 1.6 MG/DL (ref 1.7–2.3)
MCH RBC QN AUTO: 30.9 PG (ref 27–31.3)
MCHC RBC AUTO-ENTMCNC: 34.2 % (ref 33–37)
MCV RBC AUTO: 90.3 FL (ref 82–100)
PDW BLD-RTO: 14.1 % (ref 11.5–14.5)
PERFORMED ON: ABNORMAL
PERFORMED ON: ABNORMAL
PLATELET # BLD: 142 K/UL (ref 130–400)
POTASSIUM SERPL-SCNC: 3.4 MEQ/L (ref 3.5–5.1)
PROCALCITONIN: 1.65 NG/ML (ref 0–0.15)
RBC # BLD: 4.5 M/UL (ref 4.2–5.4)
SODIUM BLD-SCNC: 134 MEQ/L (ref 132–144)
TOTAL PROTEIN: 7.2 G/DL (ref 6.4–8.1)
WBC # BLD: 14.5 K/UL (ref 4.8–10.8)

## 2018-11-28 PROCEDURE — 93005 ELECTROCARDIOGRAM TRACING: CPT

## 2018-11-28 PROCEDURE — 77001 FLUOROGUIDE FOR VEIN DEVICE: CPT | Performed by: RADIOLOGY

## 2018-11-28 PROCEDURE — 73502 X-RAY EXAM HIP UNI 2-3 VIEWS: CPT

## 2018-11-28 PROCEDURE — 6360000002 HC RX W HCPCS: Performed by: NURSE PRACTITIONER

## 2018-11-28 PROCEDURE — 90715 TDAP VACCINE 7 YRS/> IM: CPT | Performed by: EMERGENCY MEDICINE

## 2018-11-28 PROCEDURE — 84145 PROCALCITONIN (PCT): CPT

## 2018-11-28 PROCEDURE — 2500000003 HC RX 250 WO HCPCS: Performed by: INTERNAL MEDICINE

## 2018-11-28 PROCEDURE — 76937 US GUIDE VASCULAR ACCESS: CPT | Performed by: RADIOLOGY

## 2018-11-28 PROCEDURE — 6370000000 HC RX 637 (ALT 250 FOR IP): Performed by: INTERNAL MEDICINE

## 2018-11-28 PROCEDURE — 85027 COMPLETE CBC AUTOMATED: CPT

## 2018-11-28 PROCEDURE — 87040 BLOOD CULTURE FOR BACTERIA: CPT

## 2018-11-28 PROCEDURE — 90471 IMMUNIZATION ADMIN: CPT | Performed by: EMERGENCY MEDICINE

## 2018-11-28 PROCEDURE — 6360000002 HC RX W HCPCS: Performed by: EMERGENCY MEDICINE

## 2018-11-28 PROCEDURE — 71046 X-RAY EXAM CHEST 2 VIEWS: CPT

## 2018-11-28 PROCEDURE — 6370000000 HC RX 637 (ALT 250 FOR IP): Performed by: NURSE PRACTITIONER

## 2018-11-28 PROCEDURE — 80053 COMPREHEN METABOLIC PANEL: CPT

## 2018-11-28 PROCEDURE — 83735 ASSAY OF MAGNESIUM: CPT

## 2018-11-28 PROCEDURE — 36415 COLL VENOUS BLD VENIPUNCTURE: CPT

## 2018-11-28 PROCEDURE — 2580000003 HC RX 258: Performed by: EMERGENCY MEDICINE

## 2018-11-28 PROCEDURE — 83605 ASSAY OF LACTIC ACID: CPT

## 2018-11-28 PROCEDURE — 99285 EMERGENCY DEPT VISIT HI MDM: CPT

## 2018-11-28 PROCEDURE — 02HV33Z INSERTION OF INFUSION DEVICE INTO SUPERIOR VENA CAVA, PERCUTANEOUS APPROACH: ICD-10-PCS | Performed by: RADIOLOGY

## 2018-11-28 PROCEDURE — 94640 AIRWAY INHALATION TREATMENT: CPT

## 2018-11-28 PROCEDURE — 2580000003 HC RX 258: Performed by: INTERNAL MEDICINE

## 2018-11-28 PROCEDURE — 36569 INSJ PICC 5 YR+ W/O IMAGING: CPT | Performed by: RADIOLOGY

## 2018-11-28 PROCEDURE — 2709999900 IR PICC WO SQ PORT/PUMP > 5 YEARS

## 2018-11-28 PROCEDURE — 6370000000 HC RX 637 (ALT 250 FOR IP): Performed by: EMERGENCY MEDICINE

## 2018-11-28 PROCEDURE — 1210000000 HC MED SURG R&B

## 2018-11-28 RX ORDER — INSULIN GLARGINE 100 [IU]/ML
15 INJECTION, SOLUTION SUBCUTANEOUS NIGHTLY
Status: DISCONTINUED | OUTPATIENT
Start: 2018-11-28 | End: 2018-11-30 | Stop reason: HOSPADM

## 2018-11-28 RX ORDER — SODIUM CHLORIDE 9 MG/ML
INJECTION, SOLUTION INTRAVENOUS CONTINUOUS
Status: DISCONTINUED | OUTPATIENT
Start: 2018-11-28 | End: 2018-11-30 | Stop reason: HOSPADM

## 2018-11-28 RX ORDER — SODIUM CHLORIDE 0.9 % (FLUSH) 0.9 %
10 SYRINGE (ML) INJECTION PRN
Status: DISCONTINUED | OUTPATIENT
Start: 2018-11-28 | End: 2018-11-28 | Stop reason: SDUPTHER

## 2018-11-28 RX ORDER — POTASSIUM CHLORIDE 7.45 MG/ML
10 INJECTION INTRAVENOUS PRN
Status: DISCONTINUED | OUTPATIENT
Start: 2018-11-28 | End: 2018-11-30 | Stop reason: HOSPADM

## 2018-11-28 RX ORDER — MAGNESIUM SULFATE IN WATER 40 MG/ML
2 INJECTION, SOLUTION INTRAVENOUS ONCE
Status: COMPLETED | OUTPATIENT
Start: 2018-11-28 | End: 2018-11-28

## 2018-11-28 RX ORDER — ONDANSETRON 2 MG/ML
4 INJECTION INTRAMUSCULAR; INTRAVENOUS EVERY 6 HOURS PRN
Status: DISCONTINUED | OUTPATIENT
Start: 2018-11-28 | End: 2018-11-30 | Stop reason: HOSPADM

## 2018-11-28 RX ORDER — LIDOCAINE HYDROCHLORIDE 20 MG/ML
5 INJECTION, SOLUTION INFILTRATION; PERINEURAL ONCE
Status: COMPLETED | OUTPATIENT
Start: 2018-11-28 | End: 2018-11-28

## 2018-11-28 RX ORDER — SODIUM CHLORIDE 9 MG/ML
250 INJECTION, SOLUTION INTRAVENOUS ONCE
Status: COMPLETED | OUTPATIENT
Start: 2018-11-28 | End: 2018-11-28

## 2018-11-28 RX ORDER — 0.9 % SODIUM CHLORIDE 0.9 %
1000 INTRAVENOUS SOLUTION INTRAVENOUS ONCE
Status: COMPLETED | OUTPATIENT
Start: 2018-11-28 | End: 2018-11-28

## 2018-11-28 RX ORDER — SODIUM CHLORIDE 0.9 % (FLUSH) 0.9 %
10 SYRINGE (ML) INJECTION PRN
Status: DISCONTINUED | OUTPATIENT
Start: 2018-11-28 | End: 2018-11-30 | Stop reason: HOSPADM

## 2018-11-28 RX ORDER — POTASSIUM CHLORIDE AND SODIUM CHLORIDE 900; 300 MG/100ML; MG/100ML
INJECTION, SOLUTION INTRAVENOUS CONTINUOUS
Status: DISCONTINUED | OUTPATIENT
Start: 2018-11-28 | End: 2018-11-29 | Stop reason: ALTCHOICE

## 2018-11-28 RX ORDER — ACETAMINOPHEN 325 MG/1
650 TABLET ORAL EVERY 4 HOURS PRN
Status: DISCONTINUED | OUTPATIENT
Start: 2018-11-28 | End: 2018-11-30 | Stop reason: HOSPADM

## 2018-11-28 RX ORDER — DEXTROSE MONOHYDRATE 25 G/50ML
12.5 INJECTION, SOLUTION INTRAVENOUS PRN
Status: DISCONTINUED | OUTPATIENT
Start: 2018-11-28 | End: 2018-11-30 | Stop reason: HOSPADM

## 2018-11-28 RX ORDER — NICOTINE POLACRILEX 4 MG
15 LOZENGE BUCCAL PRN
Status: DISCONTINUED | OUTPATIENT
Start: 2018-11-28 | End: 2018-11-30 | Stop reason: HOSPADM

## 2018-11-28 RX ORDER — DEXTROSE MONOHYDRATE 50 MG/ML
100 INJECTION, SOLUTION INTRAVENOUS PRN
Status: DISCONTINUED | OUTPATIENT
Start: 2018-11-28 | End: 2018-11-30 | Stop reason: HOSPADM

## 2018-11-28 RX ORDER — LEVOTHYROXINE SODIUM 0.07 MG/1
75 TABLET ORAL DAILY
Status: DISCONTINUED | OUTPATIENT
Start: 2018-11-29 | End: 2018-11-30 | Stop reason: HOSPADM

## 2018-11-28 RX ORDER — SODIUM CHLORIDE 0.9 % (FLUSH) 0.9 %
10 SYRINGE (ML) INJECTION EVERY 12 HOURS SCHEDULED
Status: DISCONTINUED | OUTPATIENT
Start: 2018-11-28 | End: 2018-11-28 | Stop reason: SDUPTHER

## 2018-11-28 RX ORDER — IPRATROPIUM BROMIDE AND ALBUTEROL SULFATE 2.5; .5 MG/3ML; MG/3ML
1 SOLUTION RESPIRATORY (INHALATION)
Status: DISCONTINUED | OUTPATIENT
Start: 2018-11-28 | End: 2018-11-28

## 2018-11-28 RX ORDER — ALLOPURINOL 100 MG/1
100 TABLET ORAL DAILY
Status: DISCONTINUED | OUTPATIENT
Start: 2018-11-28 | End: 2018-11-30 | Stop reason: HOSPADM

## 2018-11-28 RX ORDER — SODIUM CHLORIDE 0.9 % (FLUSH) 0.9 %
10 SYRINGE (ML) INJECTION EVERY 12 HOURS SCHEDULED
Status: DISCONTINUED | OUTPATIENT
Start: 2018-11-28 | End: 2018-11-30 | Stop reason: HOSPADM

## 2018-11-28 RX ADMIN — TETANUS TOXOID, REDUCED DIPHTHERIA TOXOID AND ACELLULAR PERTUSSIS VACCINE, ADSORBED 0.5 ML: 5; 2.5; 8; 8; 2.5 SUSPENSION INTRAMUSCULAR at 11:25

## 2018-11-28 RX ADMIN — CEFTRIAXONE SODIUM 1 G: 1 INJECTION, POWDER, FOR SOLUTION INTRAMUSCULAR; INTRAVENOUS at 14:51

## 2018-11-28 RX ADMIN — MAGNESIUM SULFATE HEPTAHYDRATE 2 G: 40 INJECTION, SOLUTION INTRAVENOUS at 17:07

## 2018-11-28 RX ADMIN — RIVAROXABAN 20 MG: 20 TABLET, FILM COATED ORAL at 21:39

## 2018-11-28 RX ADMIN — MICONAZOLE NITRATE: 20 POWDER TOPICAL at 21:44

## 2018-11-28 RX ADMIN — AZITHROMYCIN MONOHYDRATE 500 MG: 500 INJECTION, POWDER, LYOPHILIZED, FOR SOLUTION INTRAVENOUS at 16:57

## 2018-11-28 RX ADMIN — IPRATROPIUM BROMIDE AND ALBUTEROL SULFATE 1 AMPULE: .5; 3 SOLUTION RESPIRATORY (INHALATION) at 11:21

## 2018-11-28 RX ADMIN — INSULIN GLARGINE 15 UNITS: 100 INJECTION, SOLUTION SUBCUTANEOUS at 21:39

## 2018-11-28 RX ADMIN — ALLOPURINOL 100 MG: 100 TABLET ORAL at 21:39

## 2018-11-28 RX ADMIN — INSULIN LISPRO 1 UNITS: 100 INJECTION, SOLUTION INTRAVENOUS; SUBCUTANEOUS at 21:40

## 2018-11-28 RX ADMIN — SODIUM CHLORIDE: 9 INJECTION, SOLUTION INTRAVENOUS at 11:58

## 2018-11-28 RX ADMIN — SODIUM CHLORIDE: 9 INJECTION, SOLUTION INTRAVENOUS at 16:54

## 2018-11-28 RX ADMIN — POTASSIUM CHLORIDE AND SODIUM CHLORIDE: 900; 300 INJECTION, SOLUTION INTRAVENOUS at 18:06

## 2018-11-28 RX ADMIN — SODIUM CHLORIDE 1000 ML: 9 INJECTION, SOLUTION INTRAVENOUS at 11:58

## 2018-11-28 RX ADMIN — SODIUM CHLORIDE 250 ML: 9 INJECTION, SOLUTION INTRAVENOUS at 16:25

## 2018-11-28 RX ADMIN — LIDOCAINE HYDROCHLORIDE 5 ML: 20 INJECTION, SOLUTION INFILTRATION; PERINEURAL at 16:25

## 2018-11-28 ASSESSMENT — PAIN SCALES - GENERAL
PAINLEVEL_OUTOF10: 0

## 2018-11-29 ENCOUNTER — APPOINTMENT (OUTPATIENT)
Dept: INTERVENTIONAL RADIOLOGY/VASCULAR | Age: 83
DRG: 871 | End: 2018-11-29
Payer: MEDICARE

## 2018-11-29 LAB
ANION GAP SERPL CALCULATED.3IONS-SCNC: 10 MEQ/L (ref 7–13)
BASOPHILS ABSOLUTE: 0 K/UL (ref 0–0.2)
BASOPHILS RELATIVE PERCENT: 0.4 %
BUN BLDV-MCNC: 20 MG/DL (ref 8–23)
CALCIUM SERPL-MCNC: 9 MG/DL (ref 8.6–10.2)
CHLORIDE BLD-SCNC: 102 MEQ/L (ref 98–107)
CO2: 24 MEQ/L (ref 22–29)
CREAT SERPL-MCNC: 0.66 MG/DL (ref 0.5–0.9)
EOSINOPHILS ABSOLUTE: 0 K/UL (ref 0–0.7)
EOSINOPHILS RELATIVE PERCENT: 0.1 %
GFR AFRICAN AMERICAN: >60
GFR NON-AFRICAN AMERICAN: >60
GLUCOSE BLD-MCNC: 123 MG/DL (ref 60–115)
GLUCOSE BLD-MCNC: 164 MG/DL (ref 60–115)
GLUCOSE BLD-MCNC: 169 MG/DL (ref 60–115)
GLUCOSE BLD-MCNC: 173 MG/DL (ref 60–115)
GLUCOSE BLD-MCNC: 176 MG/DL (ref 74–109)
HCT VFR BLD CALC: 32.2 % (ref 37–47)
HCT VFR BLD CALC: 34.8 % (ref 37–47)
HEMOGLOBIN: 11 G/DL (ref 12–16)
HEMOGLOBIN: 11.9 G/DL (ref 12–16)
LYMPHOCYTES ABSOLUTE: 0.9 K/UL (ref 1–4.8)
LYMPHOCYTES RELATIVE PERCENT: 8.9 %
MAGNESIUM: 1.8 MG/DL (ref 1.7–2.3)
MCH RBC QN AUTO: 31 PG (ref 27–31.3)
MCH RBC QN AUTO: 31.1 PG (ref 27–31.3)
MCHC RBC AUTO-ENTMCNC: 34.2 % (ref 33–37)
MCHC RBC AUTO-ENTMCNC: 34.3 % (ref 33–37)
MCV RBC AUTO: 90.3 FL (ref 82–100)
MCV RBC AUTO: 90.7 FL (ref 82–100)
MONOCYTES ABSOLUTE: 0.7 K/UL (ref 0.2–0.8)
MONOCYTES RELATIVE PERCENT: 6.6 %
NEUTROPHILS ABSOLUTE: 8.6 K/UL (ref 1.4–6.5)
NEUTROPHILS RELATIVE PERCENT: 84 %
PDW BLD-RTO: 14.2 % (ref 11.5–14.5)
PDW BLD-RTO: 14.4 % (ref 11.5–14.5)
PERFORMED ON: ABNORMAL
PLATELET # BLD: 122 K/UL (ref 130–400)
PLATELET # BLD: 136 K/UL (ref 130–400)
POTASSIUM REFLEX MAGNESIUM: 3.8 MEQ/L (ref 3.5–5.1)
RBC # BLD: 3.54 M/UL (ref 4.2–5.4)
RBC # BLD: 3.85 M/UL (ref 4.2–5.4)
SODIUM BLD-SCNC: 136 MEQ/L (ref 132–144)
WBC # BLD: 10.3 K/UL (ref 4.8–10.8)
WBC # BLD: 9.2 K/UL (ref 4.8–10.8)

## 2018-11-29 PROCEDURE — 02HV33Z INSERTION OF INFUSION DEVICE INTO SUPERIOR VENA CAVA, PERCUTANEOUS APPROACH: ICD-10-PCS | Performed by: RADIOLOGY

## 2018-11-29 PROCEDURE — 6360000002 HC RX W HCPCS: Performed by: NURSE PRACTITIONER

## 2018-11-29 PROCEDURE — 2580000003 HC RX 258: Performed by: INTERNAL MEDICINE

## 2018-11-29 PROCEDURE — 93010 ELECTROCARDIOGRAM REPORT: CPT | Performed by: INTERNAL MEDICINE

## 2018-11-29 PROCEDURE — 83735 ASSAY OF MAGNESIUM: CPT

## 2018-11-29 PROCEDURE — 80048 BASIC METABOLIC PNL TOTAL CA: CPT

## 2018-11-29 PROCEDURE — 1210000000 HC MED SURG R&B

## 2018-11-29 PROCEDURE — 85027 COMPLETE CBC AUTOMATED: CPT

## 2018-11-29 PROCEDURE — 2709999900 IR PICC WO SQ PORT/PUMP > 5 YEARS

## 2018-11-29 PROCEDURE — 76937 US GUIDE VASCULAR ACCESS: CPT

## 2018-11-29 PROCEDURE — 2580000003 HC RX 258: Performed by: NURSE PRACTITIONER

## 2018-11-29 PROCEDURE — 77001 FLUOROGUIDE FOR VEIN DEVICE: CPT

## 2018-11-29 PROCEDURE — 2700000000 HC OXYGEN THERAPY PER DAY

## 2018-11-29 PROCEDURE — 6370000000 HC RX 637 (ALT 250 FOR IP): Performed by: INTERNAL MEDICINE

## 2018-11-29 PROCEDURE — 85025 COMPLETE CBC W/AUTO DIFF WBC: CPT

## 2018-11-29 PROCEDURE — 36569 INSJ PICC 5 YR+ W/O IMAGING: CPT

## 2018-11-29 PROCEDURE — 2500000003 HC RX 250 WO HCPCS: Performed by: INTERNAL MEDICINE

## 2018-11-29 PROCEDURE — 36415 COLL VENOUS BLD VENIPUNCTURE: CPT

## 2018-11-29 RX ORDER — SODIUM CHLORIDE 9 MG/ML
250 INJECTION, SOLUTION INTRAVENOUS ONCE
Status: COMPLETED | OUTPATIENT
Start: 2018-11-29 | End: 2018-11-29

## 2018-11-29 RX ORDER — SODIUM CHLORIDE 0.9 % (FLUSH) 0.9 %
10 SYRINGE (ML) INJECTION EVERY 12 HOURS SCHEDULED
Status: DISCONTINUED | OUTPATIENT
Start: 2018-11-29 | End: 2018-11-30 | Stop reason: HOSPADM

## 2018-11-29 RX ORDER — LIDOCAINE HYDROCHLORIDE 20 MG/ML
5 INJECTION, SOLUTION INFILTRATION; PERINEURAL ONCE
Status: COMPLETED | OUTPATIENT
Start: 2018-11-29 | End: 2018-11-29

## 2018-11-29 RX ORDER — SODIUM CHLORIDE 0.9 % (FLUSH) 0.9 %
10 SYRINGE (ML) INJECTION PRN
Status: DISCONTINUED | OUTPATIENT
Start: 2018-11-29 | End: 2018-11-30 | Stop reason: HOSPADM

## 2018-11-29 RX ADMIN — ALLOPURINOL 100 MG: 100 TABLET ORAL at 08:28

## 2018-11-29 RX ADMIN — MICONAZOLE NITRATE: 20 POWDER TOPICAL at 08:28

## 2018-11-29 RX ADMIN — MICONAZOLE NITRATE: 20 POWDER TOPICAL at 20:53

## 2018-11-29 RX ADMIN — CEFTRIAXONE SODIUM 1 G: 1 INJECTION, POWDER, FOR SOLUTION INTRAMUSCULAR; INTRAVENOUS at 13:42

## 2018-11-29 RX ADMIN — Medication 10 ML: at 20:53

## 2018-11-29 RX ADMIN — AZITHROMYCIN MONOHYDRATE 500 MG: 500 INJECTION, POWDER, LYOPHILIZED, FOR SOLUTION INTRAVENOUS at 14:20

## 2018-11-29 RX ADMIN — INSULIN GLARGINE 15 UNITS: 100 INJECTION, SOLUTION SUBCUTANEOUS at 20:53

## 2018-11-29 RX ADMIN — LEVOTHYROXINE SODIUM 75 MCG: 75 TABLET ORAL at 05:46

## 2018-11-29 RX ADMIN — LIDOCAINE HYDROCHLORIDE 5 ML: 20 INJECTION, SOLUTION INFILTRATION; PERINEURAL at 18:12

## 2018-11-29 RX ADMIN — SODIUM CHLORIDE 250 ML: 9 INJECTION, SOLUTION INTRAVENOUS at 18:15

## 2018-11-29 RX ADMIN — Medication 10 ML: at 08:28

## 2018-11-29 ASSESSMENT — PAIN SCALES - GENERAL
PAINLEVEL_OUTOF10: 0
PAINLEVEL_OUTOF10: 0

## 2018-11-30 VITALS
HEART RATE: 86 BPM | WEIGHT: 200 LBS | RESPIRATION RATE: 17 BRPM | SYSTOLIC BLOOD PRESSURE: 125 MMHG | TEMPERATURE: 97.9 F | BODY MASS INDEX: 39.27 KG/M2 | DIASTOLIC BLOOD PRESSURE: 71 MMHG | OXYGEN SATURATION: 95 % | HEIGHT: 60 IN

## 2018-11-30 LAB
ANION GAP SERPL CALCULATED.3IONS-SCNC: 9 MEQ/L (ref 7–13)
BASOPHILS ABSOLUTE: 0 K/UL (ref 0–0.2)
BASOPHILS RELATIVE PERCENT: 0.3 %
BUN BLDV-MCNC: 16 MG/DL (ref 8–23)
CALCIUM SERPL-MCNC: 8.6 MG/DL (ref 8.6–10.2)
CHLORIDE BLD-SCNC: 104 MEQ/L (ref 98–107)
CO2: 24 MEQ/L (ref 22–29)
CREAT SERPL-MCNC: 0.47 MG/DL (ref 0.5–0.9)
EOSINOPHILS ABSOLUTE: 0.2 K/UL (ref 0–0.7)
EOSINOPHILS RELATIVE PERCENT: 3.3 %
GFR AFRICAN AMERICAN: >60
GFR NON-AFRICAN AMERICAN: >60
GLUCOSE BLD-MCNC: 110 MG/DL (ref 60–115)
GLUCOSE BLD-MCNC: 111 MG/DL (ref 74–109)
GLUCOSE BLD-MCNC: 135 MG/DL (ref 60–115)
HCT VFR BLD CALC: 29.2 % (ref 37–47)
HEMOGLOBIN: 10 G/DL (ref 12–16)
LYMPHOCYTES ABSOLUTE: 1 K/UL (ref 1–4.8)
LYMPHOCYTES RELATIVE PERCENT: 13.1 %
MCH RBC QN AUTO: 30.6 PG (ref 27–31.3)
MCHC RBC AUTO-ENTMCNC: 34.3 % (ref 33–37)
MCV RBC AUTO: 89.1 FL (ref 82–100)
MONOCYTES ABSOLUTE: 0.6 K/UL (ref 0.2–0.8)
MONOCYTES RELATIVE PERCENT: 8.2 %
NEUTROPHILS ABSOLUTE: 5.5 K/UL (ref 1.4–6.5)
NEUTROPHILS RELATIVE PERCENT: 75.1 %
PDW BLD-RTO: 14.2 % (ref 11.5–14.5)
PERFORMED ON: ABNORMAL
PERFORMED ON: NORMAL
PLATELET # BLD: 130 K/UL (ref 130–400)
POTASSIUM REFLEX MAGNESIUM: 3.7 MEQ/L (ref 3.5–5.1)
RBC # BLD: 3.28 M/UL (ref 4.2–5.4)
SODIUM BLD-SCNC: 137 MEQ/L (ref 132–144)
WBC # BLD: 7.4 K/UL (ref 4.8–10.8)

## 2018-11-30 PROCEDURE — G8978 MOBILITY CURRENT STATUS: HCPCS

## 2018-11-30 PROCEDURE — G8988 SELF CARE GOAL STATUS: HCPCS

## 2018-11-30 PROCEDURE — 97166 OT EVAL MOD COMPLEX 45 MIN: CPT

## 2018-11-30 PROCEDURE — 2700000000 HC OXYGEN THERAPY PER DAY

## 2018-11-30 PROCEDURE — 6370000000 HC RX 637 (ALT 250 FOR IP): Performed by: INTERNAL MEDICINE

## 2018-11-30 PROCEDURE — G8979 MOBILITY GOAL STATUS: HCPCS

## 2018-11-30 PROCEDURE — 85025 COMPLETE CBC W/AUTO DIFF WBC: CPT

## 2018-11-30 PROCEDURE — 97162 PT EVAL MOD COMPLEX 30 MIN: CPT

## 2018-11-30 PROCEDURE — 2580000003 HC RX 258: Performed by: NURSE PRACTITIONER

## 2018-11-30 PROCEDURE — 6360000002 HC RX W HCPCS: Performed by: NURSE PRACTITIONER

## 2018-11-30 PROCEDURE — 2580000003 HC RX 258: Performed by: INTERNAL MEDICINE

## 2018-11-30 PROCEDURE — 80048 BASIC METABOLIC PNL TOTAL CA: CPT

## 2018-11-30 PROCEDURE — G8987 SELF CARE CURRENT STATUS: HCPCS

## 2018-11-30 PROCEDURE — 2580000003 HC RX 258: Performed by: EMERGENCY MEDICINE

## 2018-11-30 RX ORDER — AMOXICILLIN AND CLAVULANATE POTASSIUM 875; 125 MG/1; MG/1
1 TABLET, FILM COATED ORAL 2 TIMES DAILY
Qty: 14 TABLET | Refills: 0 | Status: SHIPPED | OUTPATIENT
Start: 2018-11-30 | End: 2018-12-07

## 2018-11-30 RX ORDER — GREEN TEA/HOODIA GORDONII 315-12.5MG
1 CAPSULE ORAL DAILY
Qty: 30 TABLET | Refills: 0 | Status: SHIPPED | OUTPATIENT
Start: 2018-11-30 | End: 2018-12-30

## 2018-11-30 RX ADMIN — AZITHROMYCIN MONOHYDRATE 500 MG: 500 INJECTION, POWDER, LYOPHILIZED, FOR SOLUTION INTRAVENOUS at 13:42

## 2018-11-30 RX ADMIN — MICONAZOLE NITRATE: 20 POWDER TOPICAL at 09:05

## 2018-11-30 RX ADMIN — Medication 10 ML: at 09:05

## 2018-11-30 RX ADMIN — LEVOTHYROXINE SODIUM 75 MCG: 75 TABLET ORAL at 06:20

## 2018-11-30 RX ADMIN — ALLOPURINOL 100 MG: 100 TABLET ORAL at 09:01

## 2018-11-30 RX ADMIN — SODIUM CHLORIDE: 9 INJECTION, SOLUTION INTRAVENOUS at 00:22

## 2018-11-30 RX ADMIN — CEFTRIAXONE SODIUM 1 G: 1 INJECTION, POWDER, FOR SOLUTION INTRAMUSCULAR; INTRAVENOUS at 13:44

## 2018-11-30 ASSESSMENT — PAIN SCALES - GENERAL
PAINLEVEL_OUTOF10: 0

## 2018-12-03 LAB
BLOOD CULTURE, ROUTINE: NORMAL
CULTURE, BLOOD 2: NORMAL

## 2018-12-04 ENCOUNTER — TELEPHONE (OUTPATIENT)
Dept: FAMILY MEDICINE CLINIC | Age: 83
End: 2018-12-04

## 2018-12-06 ENCOUNTER — OFFICE VISIT (OUTPATIENT)
Dept: FAMILY MEDICINE CLINIC | Age: 83
End: 2018-12-06
Payer: MEDICARE

## 2018-12-06 VITALS
DIASTOLIC BLOOD PRESSURE: 62 MMHG | HEART RATE: 91 BPM | HEIGHT: 60 IN | WEIGHT: 200 LBS | SYSTOLIC BLOOD PRESSURE: 118 MMHG | OXYGEN SATURATION: 94 % | RESPIRATION RATE: 14 BRPM | TEMPERATURE: 98.4 F | BODY MASS INDEX: 39.27 KG/M2

## 2018-12-06 DIAGNOSIS — R79.89 ABNORMAL CBC: ICD-10-CM

## 2018-12-06 DIAGNOSIS — R05.9 COUGH: ICD-10-CM

## 2018-12-06 DIAGNOSIS — J18.9 PNEUMONIA OF RIGHT LOWER LOBE DUE TO INFECTIOUS ORGANISM: Primary | ICD-10-CM

## 2018-12-06 DIAGNOSIS — Z09 HOSPITAL DISCHARGE FOLLOW-UP: ICD-10-CM

## 2018-12-06 DIAGNOSIS — J18.9 PNEUMONIA OF RIGHT LOWER LOBE DUE TO INFECTIOUS ORGANISM: ICD-10-CM

## 2018-12-06 DIAGNOSIS — I10 ESSENTIAL HYPERTENSION: ICD-10-CM

## 2018-12-06 LAB
HCT VFR BLD CALC: 33.5 % (ref 37–47)
HEMOGLOBIN: 11.4 G/DL (ref 12–16)
MCH RBC QN AUTO: 30.6 PG (ref 27–31.3)
MCHC RBC AUTO-ENTMCNC: 34.1 % (ref 33–37)
MCV RBC AUTO: 89.6 FL (ref 82–100)
PDW BLD-RTO: 14.2 % (ref 11.5–14.5)
PLATELET # BLD: 305 K/UL (ref 130–400)
RBC # BLD: 3.74 M/UL (ref 4.2–5.4)
WBC # BLD: 9.5 K/UL (ref 4.8–10.8)

## 2018-12-06 PROCEDURE — 1101F PT FALLS ASSESS-DOCD LE1/YR: CPT | Performed by: PHYSICIAN ASSISTANT

## 2018-12-06 PROCEDURE — G8427 DOCREV CUR MEDS BY ELIG CLIN: HCPCS | Performed by: PHYSICIAN ASSISTANT

## 2018-12-06 PROCEDURE — 1111F DSCHRG MED/CURRENT MED MERGE: CPT | Performed by: PHYSICIAN ASSISTANT

## 2018-12-06 PROCEDURE — 4040F PNEUMOC VAC/ADMIN/RCVD: CPT | Performed by: PHYSICIAN ASSISTANT

## 2018-12-06 PROCEDURE — 1090F PRES/ABSN URINE INCON ASSESS: CPT | Performed by: PHYSICIAN ASSISTANT

## 2018-12-06 PROCEDURE — 99215 OFFICE O/P EST HI 40 MIN: CPT | Performed by: PHYSICIAN ASSISTANT

## 2018-12-06 PROCEDURE — G8482 FLU IMMUNIZE ORDER/ADMIN: HCPCS | Performed by: PHYSICIAN ASSISTANT

## 2018-12-06 PROCEDURE — G8399 PT W/DXA RESULTS DOCUMENT: HCPCS | Performed by: PHYSICIAN ASSISTANT

## 2018-12-06 PROCEDURE — G8417 CALC BMI ABV UP PARAM F/U: HCPCS | Performed by: PHYSICIAN ASSISTANT

## 2018-12-06 PROCEDURE — 1123F ACP DISCUSS/DSCN MKR DOCD: CPT | Performed by: PHYSICIAN ASSISTANT

## 2018-12-06 PROCEDURE — 1036F TOBACCO NON-USER: CPT | Performed by: PHYSICIAN ASSISTANT

## 2018-12-06 RX ORDER — GUAIFENESIN 600 MG/1
600 TABLET, EXTENDED RELEASE ORAL 2 TIMES DAILY
Qty: 30 TABLET | Refills: 0 | Status: SHIPPED | OUTPATIENT
Start: 2018-12-06 | End: 2018-12-21

## 2018-12-06 ASSESSMENT — ENCOUNTER SYMPTOMS
COLOR CHANGE: 0
TROUBLE SWALLOWING: 0
NAUSEA: 0
ABDOMINAL PAIN: 0
STRIDOR: 0
SHORTNESS OF BREATH: 0
COUGH: 1
BLOOD IN STOOL: 0
WHEEZING: 0
ABDOMINAL DISTENTION: 0
CHEST TIGHTNESS: 0

## 2018-12-06 NOTE — PROGRESS NOTES
(ZYLOPRIM) 100 MG tablet TAKE 1 TABLET BY MOUTH DAILY 30 tablet 5    Insulin Pen Needle (PEN NEEDLES 31GX5/16\") 31G X 8 MM MISC 1 each by Does not apply route daily 100 each 3    furosemide (LASIX) 40 MG tablet TAKE 1 & 1/2 (ONE AND ONE-HALF) TABLETS BY MOUTH EVERY MONDAY, WEDNESDAY and FRIDAY and TAKE 1 TABLET EVERY tuesday, THURSDAY, SATURDAY and  60 tablet 2    insulin glargine (BASAGLAR KWIKPEN) 100 UNIT/ML injection pen Inject 15 Units into the skin nightly 5 pen 3    TRUEPLUS INSULIN SYRINGE 30G X 5/16\" 1 ML MISC Use twice daily. 100 each 3    magnesium oxide (MAG-OX) 400 (241.3 Mg) MG TABS tablet TAKE 1 TABLET BY MOUTH TWICE DAILY 30 tablet 2        Medications patient taking as of now reconciled against medications ordered at time of hospital discharge: Yes    Chief Complaint   Patient presents with    Follow-Up from Hospital     pneumonia follow up, needs refill on Eucerin       HPI  Inpatient course: Discharge summary reviewed- see chart. Interval history/Current status: stable. Hospital follow up. Admitted to [facility] on 11/28/2018 for generalized weakness. General work up showed RLL infiltrate. Admitted to [facility] for CAP. Started on IV abx therapy. Discharged home on 11/30/2018 in stable condition. Time at discharge, prescribed Augmentin and probiotic. She is taking therapy. She has two more days of Abx. Tolerating medication. Continuing to cough, appetite and weakness have improved greatly. Review of Systems   Constitutional: Positive for fatigue (improving). Negative for activity change, appetite change, chills, diaphoresis, fever and unexpected weight change. HENT: Negative for trouble swallowing. Respiratory: Positive for cough. Negative for chest tightness, shortness of breath, wheezing and stridor. Cardiovascular: Negative for chest pain, palpitations and leg swelling.    Gastrointestinal: Negative for abdominal distention, abdominal pain, blood in stool and

## 2018-12-27 ENCOUNTER — TELEPHONE (OUTPATIENT)
Dept: FAMILY MEDICINE CLINIC | Age: 83
End: 2018-12-27

## 2019-01-08 ENCOUNTER — APPOINTMENT (OUTPATIENT)
Dept: GENERAL RADIOLOGY | Age: 84
DRG: 690 | End: 2019-01-08
Payer: MEDICARE

## 2019-01-08 ENCOUNTER — HOSPITAL ENCOUNTER (INPATIENT)
Age: 84
LOS: 1 days | Discharge: NURSING, REHAB OR LONG-TERM CARE | DRG: 690 | End: 2019-01-10
Attending: INTERNAL MEDICINE
Payer: MEDICARE

## 2019-01-08 ENCOUNTER — APPOINTMENT (OUTPATIENT)
Dept: CT IMAGING | Age: 84
DRG: 690 | End: 2019-01-08
Payer: MEDICARE

## 2019-01-08 DIAGNOSIS — R41.0 ACUTE DELIRIUM: Primary | ICD-10-CM

## 2019-01-08 DIAGNOSIS — S09.90XA CLOSED HEAD INJURY, INITIAL ENCOUNTER: ICD-10-CM

## 2019-01-08 DIAGNOSIS — N30.00 ACUTE CYSTITIS WITHOUT HEMATURIA: ICD-10-CM
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
## 2019-01-08 LAB
BACTERIA: ABNORMAL /HPF
BASOPHILS ABSOLUTE: 0.1 K/UL (ref 0–0.2)
BASOPHILS RELATIVE PERCENT: 0.6 %
BILIRUBIN URINE: ABNORMAL
BLOOD, URINE: NEGATIVE
CLARITY: ABNORMAL
COLOR: ABNORMAL
EKG ATRIAL RATE: 78 BPM
EKG Q-T INTERVAL: 362 MS
EKG QRS DURATION: 78 MS
EKG QTC CALCULATION (BAZETT): 427 MS
EKG R AXIS: -13 DEGREES
EKG T AXIS: -7 DEGREES
EKG VENTRICULAR RATE: 84 BPM
EOSINOPHILS ABSOLUTE: 0.2 K/UL (ref 0–0.7)
EOSINOPHILS RELATIVE PERCENT: 1.8 %
EPITHELIAL CELLS, UA: ABNORMAL /HPF (ref 0–5)
GLUCOSE URINE: NEGATIVE MG/DL
HCT VFR BLD CALC: 41.7 % (ref 37–47)
HEMOGLOBIN: 13.9 G/DL (ref 12–16)
HYALINE CASTS: ABNORMAL /HPF (ref 0–5)
INR BLD: 1.2
KETONES, URINE: NEGATIVE MG/DL
LEUKOCYTE ESTERASE, URINE: ABNORMAL
LYMPHOCYTES ABSOLUTE: 3.1 K/UL (ref 1–4.8)
LYMPHOCYTES RELATIVE PERCENT: 25.1 %
MCH RBC QN AUTO: 29.3 PG (ref 27–31.3)
MCHC RBC AUTO-ENTMCNC: 33.4 % (ref 33–37)
MCV RBC AUTO: 87.8 FL (ref 82–100)
MONOCYTES ABSOLUTE: 0.7 K/UL (ref 0.2–0.8)
MONOCYTES RELATIVE PERCENT: 5.7 %
NEUTROPHILS ABSOLUTE: 8.3 K/UL (ref 1.4–6.5)
NEUTROPHILS RELATIVE PERCENT: 66.8 %
NITRITE, URINE: POSITIVE
PDW BLD-RTO: 15 % (ref 11.5–14.5)
PH UA: 5.5 (ref 5–9)
PLATELET # BLD: 202 K/UL (ref 130–400)
PROTEIN UA: NEGATIVE MG/DL
PROTHROMBIN TIME: 12.4 SEC (ref 9–11.5)
RBC # BLD: 4.76 M/UL (ref 4.2–5.4)
RBC UA: ABNORMAL /HPF (ref 0–5)
SPECIFIC GRAVITY UA: 1.02 (ref 1–1.03)
URINE REFLEX TO CULTURE: YES
UROBILINOGEN, URINE: 0.2 E.U./DL
WBC # BLD: 12.5 K/UL (ref 4.8–10.8)
WBC UA: ABNORMAL /HPF (ref 0–5)

## 2019-01-08 PROCEDURE — 87077 CULTURE AEROBIC IDENTIFY: CPT

## 2019-01-08 PROCEDURE — 93005 ELECTROCARDIOGRAM TRACING: CPT

## 2019-01-08 PROCEDURE — 99285 EMERGENCY DEPT VISIT HI MDM: CPT

## 2019-01-08 PROCEDURE — 82550 ASSAY OF CK (CPK): CPT

## 2019-01-08 PROCEDURE — 85025 COMPLETE CBC W/AUTO DIFF WBC: CPT

## 2019-01-08 PROCEDURE — 36415 COLL VENOUS BLD VENIPUNCTURE: CPT

## 2019-01-08 PROCEDURE — 70450 CT HEAD/BRAIN W/O DYE: CPT

## 2019-01-08 PROCEDURE — 72125 CT NECK SPINE W/O DYE: CPT

## 2019-01-08 PROCEDURE — 85610 PROTHROMBIN TIME: CPT

## 2019-01-08 PROCEDURE — 87086 URINE CULTURE/COLONY COUNT: CPT

## 2019-01-08 PROCEDURE — 87186 SC STD MICRODIL/AGAR DIL: CPT

## 2019-01-08 PROCEDURE — 71045 X-RAY EXAM CHEST 1 VIEW: CPT

## 2019-01-08 PROCEDURE — 81001 URINALYSIS AUTO W/SCOPE: CPT

## 2019-01-08 PROCEDURE — 80053 COMPREHEN METABOLIC PANEL: CPT

## 2019-01-08 RX ORDER — 0.9 % SODIUM CHLORIDE 0.9 %
500 INTRAVENOUS SOLUTION INTRAVENOUS ONCE
Status: COMPLETED | OUTPATIENT
Start: 2019-01-08 | End: 2019-01-09

## 2019-01-09 PROBLEM — F03.90 DEMENTIA (HCC): Status: ACTIVE | Noted: 2019-01-09

## 2019-01-09 PROBLEM — N39.0 UTI (URINARY TRACT INFECTION): Status: ACTIVE | Noted: 2019-01-09

## 2019-01-09 PROBLEM — R26.9 GAIT ABNORMALITY: Status: ACTIVE | Noted: 2019-01-09

## 2019-01-09 PROBLEM — M47.812 DJD (DEGENERATIVE JOINT DISEASE), CERVICAL: Status: ACTIVE | Noted: 2019-01-09

## 2019-01-09 PROBLEM — Z79.01 BLOOD THINNED DUE TO LONG-TERM ANTICOAGULANT USE: Status: ACTIVE | Noted: 2019-01-09

## 2019-01-09 PROBLEM — M1A.9XX0 CHRONIC GOUT: Status: ACTIVE | Noted: 2019-01-09

## 2019-01-09 LAB
ALBUMIN SERPL-MCNC: 3.8 G/DL (ref 3.9–4.9)
ALP BLD-CCNC: 96 U/L (ref 40–130)
ALT SERPL-CCNC: <5 U/L (ref 0–33)
ANION GAP SERPL CALCULATED.3IONS-SCNC: 17 MEQ/L (ref 7–13)
AST SERPL-CCNC: 26 U/L (ref 0–35)
BILIRUB SERPL-MCNC: 1.2 MG/DL (ref 0–1.2)
BUN BLDV-MCNC: 18 MG/DL (ref 8–23)
CALCIUM SERPL-MCNC: 9.1 MG/DL (ref 8.6–10.2)
CHLORIDE BLD-SCNC: 90 MEQ/L (ref 98–107)
CO2: 28 MEQ/L (ref 22–29)
CREAT SERPL-MCNC: 0.88 MG/DL (ref 0.5–0.9)
FOLATE: 8.7 NG/ML (ref 7.3–26.1)
GFR AFRICAN AMERICAN: >60
GFR NON-AFRICAN AMERICAN: >60
GLOBULIN: 3.6 G/DL (ref 2.3–3.5)
GLUCOSE BLD-MCNC: 137 MG/DL (ref 60–115)
GLUCOSE BLD-MCNC: 153 MG/DL (ref 60–115)
GLUCOSE BLD-MCNC: 164 MG/DL (ref 60–115)
GLUCOSE BLD-MCNC: 176 MG/DL (ref 60–115)
GLUCOSE BLD-MCNC: 194 MG/DL (ref 60–115)
GLUCOSE BLD-MCNC: 237 MG/DL (ref 74–109)
PERFORMED ON: ABNORMAL
POTASSIUM SERPL-SCNC: 3.7 MEQ/L (ref 3.5–5.1)
SODIUM BLD-SCNC: 135 MEQ/L (ref 132–144)
TOTAL CK: 24 U/L (ref 0–170)
TOTAL PROTEIN: 7.4 G/DL (ref 6.4–8.1)
TSH SERPL DL<=0.05 MIU/L-ACNC: 2.05 UIU/ML (ref 0.27–4.2)
VITAMIN B-12: 395 PG/ML (ref 232–1245)

## 2019-01-09 PROCEDURE — 82607 VITAMIN B-12: CPT

## 2019-01-09 PROCEDURE — 82746 ASSAY OF FOLIC ACID SERUM: CPT

## 2019-01-09 PROCEDURE — 36415 COLL VENOUS BLD VENIPUNCTURE: CPT

## 2019-01-09 PROCEDURE — 6370000000 HC RX 637 (ALT 250 FOR IP): Performed by: INTERNAL MEDICINE

## 2019-01-09 PROCEDURE — 97167 OT EVAL HIGH COMPLEX 60 MIN: CPT

## 2019-01-09 PROCEDURE — 2580000003 HC RX 258: Performed by: NURSE PRACTITIONER

## 2019-01-09 PROCEDURE — 99221 1ST HOSP IP/OBS SF/LOW 40: CPT | Performed by: PHYSICAL MEDICINE & REHABILITATION

## 2019-01-09 PROCEDURE — 84443 ASSAY THYROID STIM HORMONE: CPT

## 2019-01-09 PROCEDURE — 6360000002 HC RX W HCPCS: Performed by: NURSE PRACTITIONER

## 2019-01-09 PROCEDURE — 1210000000 HC MED SURG R&B

## 2019-01-09 PROCEDURE — 96365 THER/PROPH/DIAG IV INF INIT: CPT

## 2019-01-09 PROCEDURE — G8988 SELF CARE GOAL STATUS: HCPCS

## 2019-01-09 PROCEDURE — 2580000003 HC RX 258: Performed by: INTERNAL MEDICINE

## 2019-01-09 PROCEDURE — G8987 SELF CARE CURRENT STATUS: HCPCS

## 2019-01-09 PROCEDURE — 93010 ELECTROCARDIOGRAM REPORT: CPT | Performed by: INTERNAL MEDICINE

## 2019-01-09 PROCEDURE — 95816 EEG AWAKE AND DROWSY: CPT

## 2019-01-09 RX ORDER — NICOTINE POLACRILEX 4 MG
15 LOZENGE BUCCAL PRN
Status: DISCONTINUED | OUTPATIENT
Start: 2019-01-09 | End: 2019-01-10 | Stop reason: HOSPADM

## 2019-01-09 RX ORDER — FUROSEMIDE 40 MG/1
40 TABLET ORAL DAILY
Status: DISCONTINUED | OUTPATIENT
Start: 2019-01-09 | End: 2019-01-10 | Stop reason: HOSPADM

## 2019-01-09 RX ORDER — SODIUM CHLORIDE 0.9 % (FLUSH) 0.9 %
10 SYRINGE (ML) INJECTION PRN
Status: DISCONTINUED | OUTPATIENT
Start: 2019-01-09 | End: 2019-01-10 | Stop reason: HOSPADM

## 2019-01-09 RX ORDER — DEXTROSE MONOHYDRATE 50 MG/ML
100 INJECTION, SOLUTION INTRAVENOUS PRN
Status: DISCONTINUED | OUTPATIENT
Start: 2019-01-09 | End: 2019-01-10 | Stop reason: HOSPADM

## 2019-01-09 RX ORDER — DEXTROSE MONOHYDRATE 25 G/50ML
12.5 INJECTION, SOLUTION INTRAVENOUS PRN
Status: DISCONTINUED | OUTPATIENT
Start: 2019-01-09 | End: 2019-01-10 | Stop reason: HOSPADM

## 2019-01-09 RX ORDER — SODIUM CHLORIDE 0.9 % (FLUSH) 0.9 %
10 SYRINGE (ML) INJECTION EVERY 12 HOURS SCHEDULED
Status: DISCONTINUED | OUTPATIENT
Start: 2019-01-09 | End: 2019-01-10 | Stop reason: HOSPADM

## 2019-01-09 RX ORDER — ONDANSETRON 2 MG/ML
4 INJECTION INTRAMUSCULAR; INTRAVENOUS EVERY 6 HOURS PRN
Status: DISCONTINUED | OUTPATIENT
Start: 2019-01-09 | End: 2019-01-10 | Stop reason: HOSPADM

## 2019-01-09 RX ORDER — ALLOPURINOL 100 MG/1
100 TABLET ORAL DAILY
Status: DISCONTINUED | OUTPATIENT
Start: 2019-01-09 | End: 2019-01-10 | Stop reason: HOSPADM

## 2019-01-09 RX ORDER — LEVOTHYROXINE SODIUM 0.07 MG/1
75 TABLET ORAL DAILY
Status: DISCONTINUED | OUTPATIENT
Start: 2019-01-09 | End: 2019-01-10 | Stop reason: HOSPADM

## 2019-01-09 RX ORDER — LOSARTAN POTASSIUM 25 MG/1
25 TABLET ORAL DAILY
Status: DISCONTINUED | OUTPATIENT
Start: 2019-01-09 | End: 2019-01-10 | Stop reason: HOSPADM

## 2019-01-09 RX ORDER — INSULIN GLARGINE 100 [IU]/ML
15 INJECTION, SOLUTION SUBCUTANEOUS NIGHTLY
Status: DISCONTINUED | OUTPATIENT
Start: 2019-01-09 | End: 2019-01-10 | Stop reason: HOSPADM

## 2019-01-09 RX ADMIN — Medication 400 MG: at 22:40

## 2019-01-09 RX ADMIN — FUROSEMIDE 40 MG: 40 TABLET ORAL at 11:00

## 2019-01-09 RX ADMIN — LOSARTAN POTASSIUM 25 MG: 25 TABLET ORAL at 11:01

## 2019-01-09 RX ADMIN — Medication 10 ML: at 09:48

## 2019-01-09 RX ADMIN — INSULIN LISPRO 1 UNITS: 100 INJECTION, SOLUTION INTRAVENOUS; SUBCUTANEOUS at 14:38

## 2019-01-09 RX ADMIN — RIVAROXABAN 20 MG: 20 TABLET, FILM COATED ORAL at 18:24

## 2019-01-09 RX ADMIN — SODIUM CHLORIDE 500 ML: 9 INJECTION, SOLUTION INTRAVENOUS at 00:09

## 2019-01-09 RX ADMIN — CEFTRIAXONE SODIUM 1 G: 1 INJECTION, POWDER, FOR SOLUTION INTRAMUSCULAR; INTRAVENOUS at 00:58

## 2019-01-09 RX ADMIN — ALLOPURINOL 100 MG: 100 TABLET ORAL at 11:01

## 2019-01-09 RX ADMIN — Medication 400 MG: at 11:01

## 2019-01-09 RX ADMIN — INSULIN LISPRO 1 UNITS: 100 INJECTION, SOLUTION INTRAVENOUS; SUBCUTANEOUS at 18:25

## 2019-01-09 ASSESSMENT — ENCOUNTER SYMPTOMS
STRIDOR: 0
WHEEZING: 0
RHINORRHEA: 0
CHEST TIGHTNESS: 0
ALLERGIC/IMMUNOLOGIC NEGATIVE: 1
EYES NEGATIVE: 1
SORE THROAT: 0
RESPIRATORY NEGATIVE: 1
EYE REDNESS: 0
ABDOMINAL PAIN: 0
EYE PAIN: 0
SHORTNESS OF BREATH: 1
COUGH: 0
BLOOD IN STOOL: 0
VOMITING: 0
SHORTNESS OF BREATH: 0
NAUSEA: 0
COLOR CHANGE: 0
BACK PAIN: 1
PHOTOPHOBIA: 0
DIARRHEA: 0
TROUBLE SWALLOWING: 0
CONSTIPATION: 1

## 2019-01-09 ASSESSMENT — PAIN SCALES - GENERAL: PAINLEVEL_OUTOF10: 0

## 2019-01-10 ENCOUNTER — HOSPITAL ENCOUNTER (INPATIENT)
Age: 84
LOS: 11 days | Discharge: HOME HEALTH | DRG: 689 | End: 2019-01-21
Attending: PHYSICAL MEDICINE & REHABILITATION | Admitting: PHYSICAL MEDICINE & REHABILITATION
Payer: MEDICARE

## 2019-01-10 VITALS
SYSTOLIC BLOOD PRESSURE: 112 MMHG | DIASTOLIC BLOOD PRESSURE: 49 MMHG | BODY MASS INDEX: 40.48 KG/M2 | RESPIRATION RATE: 18 BRPM | WEIGHT: 220 LBS | HEIGHT: 62 IN | OXYGEN SATURATION: 95 % | TEMPERATURE: 98.1 F | HEART RATE: 84 BPM

## 2019-01-10 DIAGNOSIS — E11.9 TYPE 2 DIABETES MELLITUS WITHOUT COMPLICATION, WITH LONG-TERM CURRENT USE OF INSULIN (HCC): ICD-10-CM

## 2019-01-10 DIAGNOSIS — Z79.4 TYPE 2 DIABETES MELLITUS WITHOUT COMPLICATION, WITH LONG-TERM CURRENT USE OF INSULIN (HCC): ICD-10-CM

## 2019-01-10 DIAGNOSIS — I10 ESSENTIAL HYPERTENSION: ICD-10-CM

## 2019-01-10 PROBLEM — R26.9 ABNORMALITY OF GAIT AND MOBILITY: Status: ACTIVE | Noted: 2019-01-10

## 2019-01-10 PROBLEM — M19.90 OA (OSTEOARTHRITIS): Status: ACTIVE | Noted: 2019-01-10

## 2019-01-10 LAB
ALBUMIN SERPL-MCNC: 2.4 G/DL (ref 3.9–4.9)
ALP BLD-CCNC: 70 U/L (ref 40–130)
ALT SERPL-CCNC: 6 U/L (ref 0–33)
ANION GAP SERPL CALCULATED.3IONS-SCNC: 13 MEQ/L (ref 7–13)
AST SERPL-CCNC: 26 U/L (ref 0–35)
BASOPHILS ABSOLUTE: 0.1 K/UL (ref 0–0.2)
BASOPHILS RELATIVE PERCENT: 1 %
BILIRUB SERPL-MCNC: 0.7 MG/DL (ref 0–1.2)
BILIRUBIN URINE: NEGATIVE
BLOOD, URINE: NEGATIVE
BUN BLDV-MCNC: 17 MG/DL (ref 8–23)
CALCIUM SERPL-MCNC: 8.8 MG/DL (ref 8.6–10.2)
CHLORIDE BLD-SCNC: 94 MEQ/L (ref 98–107)
CLARITY: ABNORMAL
CO2: 24 MEQ/L (ref 22–29)
COLOR: YELLOW
CREAT SERPL-MCNC: 0.77 MG/DL (ref 0.5–0.9)
EOSINOPHILS ABSOLUTE: 0.5 K/UL (ref 0–0.7)
EOSINOPHILS RELATIVE PERCENT: 5.6 %
GFR AFRICAN AMERICAN: >60
GFR NON-AFRICAN AMERICAN: >60
GLOBULIN: 3.7 G/DL (ref 2.3–3.5)
GLUCOSE BLD-MCNC: 122 MG/DL (ref 60–115)
GLUCOSE BLD-MCNC: 133 MG/DL (ref 60–115)
GLUCOSE BLD-MCNC: 141 MG/DL (ref 74–109)
GLUCOSE BLD-MCNC: 151 MG/DL (ref 60–115)
GLUCOSE BLD-MCNC: 151 MG/DL (ref 60–115)
GLUCOSE URINE: NEGATIVE MG/DL
HCT VFR BLD CALC: 36.4 % (ref 37–47)
HEMOGLOBIN: 12.3 G/DL (ref 12–16)
KETONES, URINE: ABNORMAL MG/DL
LEUKOCYTE ESTERASE, URINE: ABNORMAL
LYMPHOCYTES ABSOLUTE: 1.7 K/UL (ref 1–4.8)
LYMPHOCYTES RELATIVE PERCENT: 18.8 %
MCH RBC QN AUTO: 29.6 PG (ref 27–31.3)
MCHC RBC AUTO-ENTMCNC: 33.9 % (ref 33–37)
MCV RBC AUTO: 87.2 FL (ref 82–100)
MONOCYTES ABSOLUTE: 0.5 K/UL (ref 0.2–0.8)
MONOCYTES RELATIVE PERCENT: 5.9 %
NEUTROPHILS ABSOLUTE: 6.2 K/UL (ref 1.4–6.5)
NEUTROPHILS RELATIVE PERCENT: 68.7 %
NITRITE, URINE: NEGATIVE
PDW BLD-RTO: 15.1 % (ref 11.5–14.5)
PERFORMED ON: ABNORMAL
PH UA: 5 (ref 5–9)
PLATELET # BLD: 150 K/UL (ref 130–400)
POTASSIUM REFLEX MAGNESIUM: 4 MEQ/L (ref 3.5–5.1)
PROTEIN UA: NEGATIVE MG/DL
RBC # BLD: 4.17 M/UL (ref 4.2–5.4)
SODIUM BLD-SCNC: 131 MEQ/L (ref 132–144)
SPECIFIC GRAVITY UA: 1.02 (ref 1–1.03)
TOTAL PROTEIN: 6.1 G/DL (ref 6.4–8.1)
UROBILINOGEN, URINE: 0.2 E.U./DL
WBC # BLD: 9 K/UL (ref 4.8–10.8)

## 2019-01-10 PROCEDURE — G8979 MOBILITY GOAL STATUS: HCPCS

## 2019-01-10 PROCEDURE — 2580000003 HC RX 258: Performed by: INTERNAL MEDICINE

## 2019-01-10 PROCEDURE — 80053 COMPREHEN METABOLIC PANEL: CPT

## 2019-01-10 PROCEDURE — 81001 URINALYSIS AUTO W/SCOPE: CPT

## 2019-01-10 PROCEDURE — 6370000000 HC RX 637 (ALT 250 FOR IP): Performed by: INTERNAL MEDICINE

## 2019-01-10 PROCEDURE — 36415 COLL VENOUS BLD VENIPUNCTURE: CPT

## 2019-01-10 PROCEDURE — 85025 COMPLETE CBC W/AUTO DIFF WBC: CPT

## 2019-01-10 PROCEDURE — 97162 PT EVAL MOD COMPLEX 30 MIN: CPT

## 2019-01-10 PROCEDURE — 6370000000 HC RX 637 (ALT 250 FOR IP): Performed by: PHYSICAL MEDICINE & REHABILITATION

## 2019-01-10 PROCEDURE — G8978 MOBILITY CURRENT STATUS: HCPCS

## 2019-01-10 PROCEDURE — 6360000002 HC RX W HCPCS: Performed by: INTERNAL MEDICINE

## 2019-01-10 PROCEDURE — 1180000000 HC REHAB R&B

## 2019-01-10 RX ORDER — DIAPER,BRIEF,INFANT-TODD,DISP
EACH MISCELLANEOUS 2 TIMES DAILY
Status: DISCONTINUED | OUTPATIENT
Start: 2019-01-10 | End: 2019-01-21 | Stop reason: HOSPADM

## 2019-01-10 RX ORDER — NICOTINE POLACRILEX 4 MG
15 LOZENGE BUCCAL PRN
Status: CANCELLED | OUTPATIENT
Start: 2019-01-10

## 2019-01-10 RX ORDER — LEVOTHYROXINE SODIUM 0.07 MG/1
75 TABLET ORAL DAILY
Status: DISCONTINUED | OUTPATIENT
Start: 2019-01-11 | End: 2019-01-21 | Stop reason: HOSPADM

## 2019-01-10 RX ORDER — INSULIN GLARGINE 100 [IU]/ML
15 INJECTION, SOLUTION SUBCUTANEOUS NIGHTLY
Status: CANCELLED | OUTPATIENT
Start: 2019-01-10

## 2019-01-10 RX ORDER — LOSARTAN POTASSIUM 25 MG/1
25 TABLET ORAL DAILY
Status: DISCONTINUED | OUTPATIENT
Start: 2019-01-11 | End: 2019-01-21 | Stop reason: HOSPADM

## 2019-01-10 RX ORDER — ALLOPURINOL 100 MG/1
100 TABLET ORAL DAILY
Status: DISCONTINUED | OUTPATIENT
Start: 2019-01-11 | End: 2019-01-21 | Stop reason: HOSPADM

## 2019-01-10 RX ORDER — SODIUM CHLORIDE 0.9 % (FLUSH) 0.9 %
10 SYRINGE (ML) INJECTION PRN
Status: CANCELLED | OUTPATIENT
Start: 2019-01-10

## 2019-01-10 RX ORDER — SODIUM CHLORIDE 0.9 % (FLUSH) 0.9 %
10 SYRINGE (ML) INJECTION EVERY 12 HOURS SCHEDULED
Status: CANCELLED | OUTPATIENT
Start: 2019-01-10

## 2019-01-10 RX ORDER — DEXTROSE MONOHYDRATE 25 G/50ML
12.5 INJECTION, SOLUTION INTRAVENOUS PRN
Status: DISCONTINUED | OUTPATIENT
Start: 2019-01-10 | End: 2019-01-21 | Stop reason: HOSPADM

## 2019-01-10 RX ORDER — SODIUM CHLORIDE 0.9 % (FLUSH) 0.9 %
10 SYRINGE (ML) INJECTION EVERY 12 HOURS SCHEDULED
Status: DISCONTINUED | OUTPATIENT
Start: 2019-01-10 | End: 2019-01-10

## 2019-01-10 RX ORDER — ONDANSETRON 2 MG/ML
4 INJECTION INTRAMUSCULAR; INTRAVENOUS EVERY 6 HOURS PRN
Status: CANCELLED | OUTPATIENT
Start: 2019-01-10

## 2019-01-10 RX ORDER — DEXTROSE MONOHYDRATE 50 MG/ML
100 INJECTION, SOLUTION INTRAVENOUS PRN
Status: CANCELLED | OUTPATIENT
Start: 2019-01-10

## 2019-01-10 RX ORDER — DEXTROSE MONOHYDRATE 25 G/50ML
12.5 INJECTION, SOLUTION INTRAVENOUS PRN
Status: CANCELLED | OUTPATIENT
Start: 2019-01-10

## 2019-01-10 RX ORDER — FUROSEMIDE 40 MG/1
40 TABLET ORAL DAILY
Status: DISCONTINUED | OUTPATIENT
Start: 2019-01-11 | End: 2019-01-21 | Stop reason: HOSPADM

## 2019-01-10 RX ORDER — INSULIN GLARGINE 100 [IU]/ML
15 INJECTION, SOLUTION SUBCUTANEOUS NIGHTLY
Status: DISCONTINUED | OUTPATIENT
Start: 2019-01-10 | End: 2019-01-21 | Stop reason: HOSPADM

## 2019-01-10 RX ORDER — ONDANSETRON 2 MG/ML
4 INJECTION INTRAMUSCULAR; INTRAVENOUS EVERY 6 HOURS PRN
Status: DISCONTINUED | OUTPATIENT
Start: 2019-01-10 | End: 2019-01-21 | Stop reason: HOSPADM

## 2019-01-10 RX ORDER — LOSARTAN POTASSIUM 25 MG/1
25 TABLET ORAL DAILY
Status: CANCELLED | OUTPATIENT
Start: 2019-01-11

## 2019-01-10 RX ORDER — SULFAMETHOXAZOLE AND TRIMETHOPRIM 800; 160 MG/1; MG/1
1 TABLET ORAL 2 TIMES DAILY
Qty: 10 TABLET | Refills: 0 | Status: SHIPPED | OUTPATIENT
Start: 2019-01-10 | End: 2019-01-10 | Stop reason: HOSPADM

## 2019-01-10 RX ORDER — FUROSEMIDE 40 MG/1
40 TABLET ORAL DAILY
Status: CANCELLED | OUTPATIENT
Start: 2019-01-11

## 2019-01-10 RX ORDER — ALLOPURINOL 100 MG/1
100 TABLET ORAL DAILY
Status: CANCELLED | OUTPATIENT
Start: 2019-01-11

## 2019-01-10 RX ORDER — SULFAMETHOXAZOLE AND TRIMETHOPRIM 800; 160 MG/1; MG/1
1 TABLET ORAL EVERY 12 HOURS SCHEDULED
Status: CANCELLED | OUTPATIENT
Start: 2019-01-10

## 2019-01-10 RX ORDER — DEXTROSE MONOHYDRATE 50 MG/ML
100 INJECTION, SOLUTION INTRAVENOUS PRN
Status: DISCONTINUED | OUTPATIENT
Start: 2019-01-10 | End: 2019-01-21 | Stop reason: HOSPADM

## 2019-01-10 RX ORDER — NICOTINE POLACRILEX 4 MG
15 LOZENGE BUCCAL PRN
Status: DISCONTINUED | OUTPATIENT
Start: 2019-01-10 | End: 2019-01-21 | Stop reason: HOSPADM

## 2019-01-10 RX ADMIN — INSULIN GLARGINE 15 UNITS: 100 INJECTION, SOLUTION SUBCUTANEOUS at 22:03

## 2019-01-10 RX ADMIN — Medication 400 MG: at 22:00

## 2019-01-10 RX ADMIN — ALLOPURINOL 100 MG: 100 TABLET ORAL at 08:39

## 2019-01-10 RX ADMIN — LEVOTHYROXINE SODIUM 75 MCG: 75 TABLET ORAL at 05:43

## 2019-01-10 RX ADMIN — LOSARTAN POTASSIUM 25 MG: 25 TABLET ORAL at 08:39

## 2019-01-10 RX ADMIN — HYDROCORTISONE: 1 CREAM TOPICAL at 20:41

## 2019-01-10 RX ADMIN — INSULIN LISPRO 1 UNITS: 100 INJECTION, SOLUTION INTRAVENOUS; SUBCUTANEOUS at 08:39

## 2019-01-10 RX ADMIN — CEFTRIAXONE SODIUM 1 G: 1 INJECTION, POWDER, FOR SOLUTION INTRAMUSCULAR; INTRAVENOUS at 01:00

## 2019-01-10 RX ADMIN — Medication 400 MG: at 08:39

## 2019-01-10 RX ADMIN — FUROSEMIDE 40 MG: 40 TABLET ORAL at 08:39

## 2019-01-10 ASSESSMENT — PAIN DESCRIPTION - ORIENTATION: ORIENTATION: RIGHT

## 2019-01-10 ASSESSMENT — PAIN DESCRIPTION - LOCATION: LOCATION: SHOULDER

## 2019-01-10 ASSESSMENT — PAIN DESCRIPTION - FREQUENCY: FREQUENCY: INTERMITTENT

## 2019-01-10 ASSESSMENT — PAIN SCALES - GENERAL
PAINLEVEL_OUTOF10: 0

## 2019-01-11 PROBLEM — B37.49 YEAST UTI: Status: ACTIVE | Noted: 2019-01-09

## 2019-01-11 PROBLEM — R26.9 GAIT ABNORMALITY: Status: RESOLVED | Noted: 2019-01-09 | Resolved: 2019-01-11

## 2019-01-11 PROBLEM — J18.9 PNEUMONIA: Status: RESOLVED | Noted: 2018-11-28 | Resolved: 2019-01-11

## 2019-01-11 LAB
BACTERIA: NEGATIVE /HPF
CASTS: ABNORMAL /LPF
EPITHELIAL CELLS, UA: ABNORMAL /HPF (ref 0–5)
GLUCOSE BLD-MCNC: 126 MG/DL (ref 60–115)
GLUCOSE BLD-MCNC: 147 MG/DL (ref 60–115)
GLUCOSE BLD-MCNC: 149 MG/DL (ref 60–115)
GLUCOSE BLD-MCNC: 184 MG/DL (ref 60–115)
ORGANISM: ABNORMAL
ORGANISM: ABNORMAL
PERFORMED ON: ABNORMAL
RBC UA: ABNORMAL /HPF (ref 0–5)
URINE CULTURE, ROUTINE: ABNORMAL
WBC UA: ABNORMAL /HPF (ref 0–5)
YEAST: PRESENT

## 2019-01-11 PROCEDURE — 97535 SELF CARE MNGMENT TRAINING: CPT

## 2019-01-11 PROCEDURE — 97116 GAIT TRAINING THERAPY: CPT

## 2019-01-11 PROCEDURE — 97530 THERAPEUTIC ACTIVITIES: CPT

## 2019-01-11 PROCEDURE — 99222 1ST HOSP IP/OBS MODERATE 55: CPT | Performed by: PHYSICAL MEDICINE & REHABILITATION

## 2019-01-11 PROCEDURE — 6370000000 HC RX 637 (ALT 250 FOR IP): Performed by: INTERNAL MEDICINE

## 2019-01-11 PROCEDURE — 97162 PT EVAL MOD COMPLEX 30 MIN: CPT

## 2019-01-11 PROCEDURE — 97112 NEUROMUSCULAR REEDUCATION: CPT

## 2019-01-11 PROCEDURE — 97166 OT EVAL MOD COMPLEX 45 MIN: CPT

## 2019-01-11 PROCEDURE — 92523 SPEECH SOUND LANG COMPREHEN: CPT

## 2019-01-11 PROCEDURE — 1180000000 HC REHAB R&B

## 2019-01-11 PROCEDURE — 97127 HC OT THER IVNTJ W/FOCUS COG FUNCJ: CPT

## 2019-01-11 PROCEDURE — 92610 EVALUATE SWALLOWING FUNCTION: CPT

## 2019-01-11 RX ORDER — BISACODYL 10 MG
10 SUPPOSITORY, RECTAL RECTAL DAILY PRN
Status: DISCONTINUED | OUTPATIENT
Start: 2019-01-11 | End: 2019-01-21 | Stop reason: HOSPADM

## 2019-01-11 RX ORDER — NITROFURANTOIN 25; 75 MG/1; MG/1
100 CAPSULE ORAL EVERY 12 HOURS SCHEDULED
Status: DISCONTINUED | OUTPATIENT
Start: 2019-01-11 | End: 2019-01-21 | Stop reason: HOSPADM

## 2019-01-11 RX ORDER — ACETAMINOPHEN 325 MG/1
650 TABLET ORAL EVERY 4 HOURS PRN
Status: DISCONTINUED | OUTPATIENT
Start: 2019-01-11 | End: 2019-01-21 | Stop reason: HOSPADM

## 2019-01-11 RX ORDER — SODIUM PHOSPHATE, DIBASIC AND SODIUM PHOSPHATE, MONOBASIC 7; 19 G/133ML; G/133ML
1 ENEMA RECTAL
Status: DISPENSED | OUTPATIENT
Start: 2019-01-11 | End: 2019-01-11

## 2019-01-11 RX ADMIN — NITROFURANTOIN (MONOHYDRATE/MACROCRYSTALS) 100 MG: 75; 25 CAPSULE ORAL at 10:40

## 2019-01-11 RX ADMIN — HYDROCORTISONE: 1 CREAM TOPICAL at 20:02

## 2019-01-11 RX ADMIN — ALLOPURINOL 100 MG: 100 TABLET ORAL at 10:29

## 2019-01-11 RX ADMIN — HYDROCORTISONE: 1 CREAM TOPICAL at 10:33

## 2019-01-11 RX ADMIN — RIVAROXABAN 20 MG: 20 TABLET, FILM COATED ORAL at 17:27

## 2019-01-11 RX ADMIN — Medication 400 MG: at 10:29

## 2019-01-11 RX ADMIN — LOSARTAN POTASSIUM 25 MG: 25 TABLET, FILM COATED ORAL at 10:30

## 2019-01-11 RX ADMIN — INSULIN GLARGINE 15 UNITS: 100 INJECTION, SOLUTION SUBCUTANEOUS at 20:09

## 2019-01-11 RX ADMIN — FUROSEMIDE 40 MG: 40 TABLET ORAL at 10:30

## 2019-01-11 RX ADMIN — NITROFURANTOIN (MONOHYDRATE/MACROCRYSTALS) 100 MG: 75; 25 CAPSULE ORAL at 20:13

## 2019-01-11 RX ADMIN — LEVOTHYROXINE SODIUM 75 MCG: 75 TABLET ORAL at 06:03

## 2019-01-11 RX ADMIN — Medication 400 MG: at 20:13

## 2019-01-11 ASSESSMENT — ENCOUNTER SYMPTOMS
SHORTNESS OF BREATH: 1
ABDOMINAL PAIN: 0
DIARRHEA: 0
VOMITING: 0
CONSTIPATION: 1
COUGH: 0
BACK PAIN: 1
SORE THROAT: 0
VISUAL CHANGE: 0
PHOTOPHOBIA: 0
NAUSEA: 0
BLOOD IN STOOL: 0
WHEEZING: 0
STRIDOR: 0
CHANGE IN BOWEL HABIT: 0
EYE REDNESS: 0
EYE PAIN: 0

## 2019-01-11 ASSESSMENT — PAIN SCALES - GENERAL: PAINLEVEL_OUTOF10: 0

## 2019-01-12 LAB
GLUCOSE BLD-MCNC: 116 MG/DL (ref 60–115)
GLUCOSE BLD-MCNC: 119 MG/DL (ref 60–115)
GLUCOSE BLD-MCNC: 132 MG/DL (ref 60–115)
GLUCOSE BLD-MCNC: 133 MG/DL (ref 60–115)
HCT VFR BLD CALC: 33.2 % (ref 37–47)
HEMOGLOBIN: 11.5 G/DL (ref 12–16)
MCH RBC QN AUTO: 30.1 PG (ref 27–31.3)
MCHC RBC AUTO-ENTMCNC: 34.6 % (ref 33–37)
MCV RBC AUTO: 86.8 FL (ref 82–100)
PDW BLD-RTO: 15.1 % (ref 11.5–14.5)
PERFORMED ON: ABNORMAL
PLATELET # BLD: 150 K/UL (ref 130–400)
RBC # BLD: 3.82 M/UL (ref 4.2–5.4)
WBC # BLD: 7.9 K/UL (ref 4.8–10.8)

## 2019-01-12 PROCEDURE — 97112 NEUROMUSCULAR REEDUCATION: CPT

## 2019-01-12 PROCEDURE — 6370000000 HC RX 637 (ALT 250 FOR IP): Performed by: NURSE PRACTITIONER

## 2019-01-12 PROCEDURE — 85027 COMPLETE CBC AUTOMATED: CPT

## 2019-01-12 PROCEDURE — 97535 SELF CARE MNGMENT TRAINING: CPT

## 2019-01-12 PROCEDURE — 97530 THERAPEUTIC ACTIVITIES: CPT

## 2019-01-12 PROCEDURE — 6370000000 HC RX 637 (ALT 250 FOR IP): Performed by: INTERNAL MEDICINE

## 2019-01-12 PROCEDURE — 36415 COLL VENOUS BLD VENIPUNCTURE: CPT

## 2019-01-12 PROCEDURE — 97116 GAIT TRAINING THERAPY: CPT

## 2019-01-12 PROCEDURE — 97110 THERAPEUTIC EXERCISES: CPT

## 2019-01-12 PROCEDURE — 1180000000 HC REHAB R&B

## 2019-01-12 RX ORDER — FLUCONAZOLE 100 MG/1
200 TABLET ORAL ONCE
Status: COMPLETED | OUTPATIENT
Start: 2019-01-12 | End: 2019-01-12

## 2019-01-12 RX ADMIN — ALLOPURINOL 100 MG: 100 TABLET ORAL at 08:48

## 2019-01-12 RX ADMIN — NITROFURANTOIN (MONOHYDRATE/MACROCRYSTALS) 100 MG: 75; 25 CAPSULE ORAL at 22:09

## 2019-01-12 RX ADMIN — RIVAROXABAN 20 MG: 20 TABLET, FILM COATED ORAL at 17:20

## 2019-01-12 RX ADMIN — NITROFURANTOIN (MONOHYDRATE/MACROCRYSTALS) 100 MG: 75; 25 CAPSULE ORAL at 08:48

## 2019-01-12 RX ADMIN — LOSARTAN POTASSIUM 25 MG: 25 TABLET, FILM COATED ORAL at 08:48

## 2019-01-12 RX ADMIN — Medication 400 MG: at 22:09

## 2019-01-12 RX ADMIN — FLUCONAZOLE 200 MG: 100 TABLET ORAL at 12:53

## 2019-01-12 RX ADMIN — HYDROCORTISONE: 1 CREAM TOPICAL at 08:49

## 2019-01-12 RX ADMIN — Medication 400 MG: at 08:48

## 2019-01-12 RX ADMIN — HYDROCORTISONE: 1 CREAM TOPICAL at 22:08

## 2019-01-12 RX ADMIN — LEVOTHYROXINE SODIUM 75 MCG: 75 TABLET ORAL at 06:21

## 2019-01-12 RX ADMIN — INSULIN GLARGINE 15 UNITS: 100 INJECTION, SOLUTION SUBCUTANEOUS at 22:11

## 2019-01-12 RX ADMIN — FUROSEMIDE 40 MG: 40 TABLET ORAL at 08:48

## 2019-01-12 ASSESSMENT — PAIN DESCRIPTION - ORIENTATION: ORIENTATION: RIGHT

## 2019-01-12 ASSESSMENT — PAIN DESCRIPTION - LOCATION: LOCATION: SHOULDER

## 2019-01-12 ASSESSMENT — PAIN DESCRIPTION - FREQUENCY: FREQUENCY: INTERMITTENT

## 2019-01-12 ASSESSMENT — PAIN DESCRIPTION - PROGRESSION: CLINICAL_PROGRESSION: NOT CHANGED

## 2019-01-12 ASSESSMENT — PAIN SCALES - GENERAL
PAINLEVEL_OUTOF10: 0

## 2019-01-12 ASSESSMENT — PAIN DESCRIPTION - DESCRIPTORS: DESCRIPTORS: ACHING

## 2019-01-12 ASSESSMENT — PAIN SCALES - WONG BAKER: WONGBAKER_NUMERICALRESPONSE: 6

## 2019-01-13 LAB
GLUCOSE BLD-MCNC: 121 MG/DL (ref 60–115)
GLUCOSE BLD-MCNC: 128 MG/DL (ref 60–115)
GLUCOSE BLD-MCNC: 148 MG/DL (ref 60–115)
GLUCOSE BLD-MCNC: 151 MG/DL (ref 60–115)
PERFORMED ON: ABNORMAL

## 2019-01-13 PROCEDURE — 1180000000 HC REHAB R&B

## 2019-01-13 PROCEDURE — 6370000000 HC RX 637 (ALT 250 FOR IP): Performed by: INTERNAL MEDICINE

## 2019-01-13 RX ADMIN — NITROFURANTOIN (MONOHYDRATE/MACROCRYSTALS) 100 MG: 75; 25 CAPSULE ORAL at 19:58

## 2019-01-13 RX ADMIN — NITROFURANTOIN (MONOHYDRATE/MACROCRYSTALS) 100 MG: 75; 25 CAPSULE ORAL at 08:22

## 2019-01-13 RX ADMIN — Medication 400 MG: at 19:59

## 2019-01-13 RX ADMIN — LOSARTAN POTASSIUM 25 MG: 25 TABLET, FILM COATED ORAL at 08:22

## 2019-01-13 RX ADMIN — LEVOTHYROXINE SODIUM 75 MCG: 75 TABLET ORAL at 05:06

## 2019-01-13 RX ADMIN — FUROSEMIDE 40 MG: 40 TABLET ORAL at 08:22

## 2019-01-13 RX ADMIN — HYDROCORTISONE: 1 CREAM TOPICAL at 19:35

## 2019-01-13 RX ADMIN — Medication 400 MG: at 08:22

## 2019-01-13 RX ADMIN — INSULIN GLARGINE 15 UNITS: 100 INJECTION, SOLUTION SUBCUTANEOUS at 19:49

## 2019-01-13 RX ADMIN — RIVAROXABAN 20 MG: 20 TABLET, FILM COATED ORAL at 17:13

## 2019-01-13 RX ADMIN — HYDROCORTISONE: 1 CREAM TOPICAL at 08:24

## 2019-01-13 RX ADMIN — ALLOPURINOL 100 MG: 100 TABLET ORAL at 08:22

## 2019-01-14 LAB
GLUCOSE BLD-MCNC: 111 MG/DL (ref 60–115)
GLUCOSE BLD-MCNC: 117 MG/DL (ref 60–115)
GLUCOSE BLD-MCNC: 133 MG/DL (ref 60–115)
GLUCOSE BLD-MCNC: 170 MG/DL (ref 60–115)
PERFORMED ON: ABNORMAL
PERFORMED ON: NORMAL

## 2019-01-14 PROCEDURE — 97530 THERAPEUTIC ACTIVITIES: CPT

## 2019-01-14 PROCEDURE — 97535 SELF CARE MNGMENT TRAINING: CPT

## 2019-01-14 PROCEDURE — 97110 THERAPEUTIC EXERCISES: CPT

## 2019-01-14 PROCEDURE — 97112 NEUROMUSCULAR REEDUCATION: CPT

## 2019-01-14 PROCEDURE — 97116 GAIT TRAINING THERAPY: CPT

## 2019-01-14 PROCEDURE — 1180000000 HC REHAB R&B

## 2019-01-14 PROCEDURE — 6370000000 HC RX 637 (ALT 250 FOR IP): Performed by: INTERNAL MEDICINE

## 2019-01-14 PROCEDURE — 99233 SBSQ HOSP IP/OBS HIGH 50: CPT | Performed by: PHYSICAL MEDICINE & REHABILITATION

## 2019-01-14 RX ADMIN — LEVOTHYROXINE SODIUM 75 MCG: 75 TABLET ORAL at 06:39

## 2019-01-14 RX ADMIN — HYDROCORTISONE: 1 CREAM TOPICAL at 09:27

## 2019-01-14 RX ADMIN — NITROFURANTOIN (MONOHYDRATE/MACROCRYSTALS) 100 MG: 75; 25 CAPSULE ORAL at 23:00

## 2019-01-14 RX ADMIN — INSULIN GLARGINE 15 UNITS: 100 INJECTION, SOLUTION SUBCUTANEOUS at 23:01

## 2019-01-14 RX ADMIN — FUROSEMIDE 40 MG: 40 TABLET ORAL at 09:19

## 2019-01-14 RX ADMIN — NITROFURANTOIN (MONOHYDRATE/MACROCRYSTALS) 100 MG: 75; 25 CAPSULE ORAL at 09:19

## 2019-01-14 RX ADMIN — Medication 400 MG: at 23:00

## 2019-01-14 RX ADMIN — Medication 400 MG: at 09:19

## 2019-01-14 RX ADMIN — ALLOPURINOL 100 MG: 100 TABLET ORAL at 09:19

## 2019-01-14 RX ADMIN — LOSARTAN POTASSIUM 25 MG: 25 TABLET, FILM COATED ORAL at 09:19

## 2019-01-14 RX ADMIN — MAGNESIUM HYDROXIDE 30 ML: 400 SUSPENSION ORAL at 15:12

## 2019-01-14 RX ADMIN — RIVAROXABAN 20 MG: 20 TABLET, FILM COATED ORAL at 16:55

## 2019-01-14 ASSESSMENT — PAIN SCALES - GENERAL: PAINLEVEL_OUTOF10: 0

## 2019-01-15 LAB
GLUCOSE BLD-MCNC: 112 MG/DL (ref 60–115)
GLUCOSE BLD-MCNC: 121 MG/DL (ref 60–115)
GLUCOSE BLD-MCNC: 123 MG/DL (ref 60–115)
GLUCOSE BLD-MCNC: 157 MG/DL (ref 60–115)
PERFORMED ON: ABNORMAL
PERFORMED ON: NORMAL

## 2019-01-15 PROCEDURE — 97112 NEUROMUSCULAR REEDUCATION: CPT

## 2019-01-15 PROCEDURE — 6370000000 HC RX 637 (ALT 250 FOR IP): Performed by: INTERNAL MEDICINE

## 2019-01-15 PROCEDURE — 6370000000 HC RX 637 (ALT 250 FOR IP): Performed by: PHYSICAL MEDICINE & REHABILITATION

## 2019-01-15 PROCEDURE — 97116 GAIT TRAINING THERAPY: CPT

## 2019-01-15 PROCEDURE — 97127 HC SP THER IVNTJ W/FOCUS COG FUNCJ: CPT

## 2019-01-15 PROCEDURE — 97530 THERAPEUTIC ACTIVITIES: CPT

## 2019-01-15 PROCEDURE — 99232 SBSQ HOSP IP/OBS MODERATE 35: CPT | Performed by: PHYSICAL MEDICINE & REHABILITATION

## 2019-01-15 PROCEDURE — 97535 SELF CARE MNGMENT TRAINING: CPT

## 2019-01-15 PROCEDURE — 97127 HC OT THER IVNTJ W/FOCUS COG FUNCJ: CPT

## 2019-01-15 PROCEDURE — 97110 THERAPEUTIC EXERCISES: CPT

## 2019-01-15 PROCEDURE — 1180000000 HC REHAB R&B

## 2019-01-15 RX ADMIN — LEVOTHYROXINE SODIUM 75 MCG: 75 TABLET ORAL at 06:13

## 2019-01-15 RX ADMIN — INSULIN GLARGINE 15 UNITS: 100 INJECTION, SOLUTION SUBCUTANEOUS at 21:06

## 2019-01-15 RX ADMIN — RIVAROXABAN 20 MG: 20 TABLET, FILM COATED ORAL at 17:48

## 2019-01-15 RX ADMIN — HYDROCORTISONE: 1 CREAM TOPICAL at 21:05

## 2019-01-15 RX ADMIN — NITROFURANTOIN (MONOHYDRATE/MACROCRYSTALS) 100 MG: 75; 25 CAPSULE ORAL at 21:05

## 2019-01-15 RX ADMIN — ALLOPURINOL 100 MG: 100 TABLET ORAL at 10:43

## 2019-01-15 RX ADMIN — LOSARTAN POTASSIUM 25 MG: 25 TABLET, FILM COATED ORAL at 12:10

## 2019-01-15 RX ADMIN — NITROFURANTOIN (MONOHYDRATE/MACROCRYSTALS) 100 MG: 75; 25 CAPSULE ORAL at 10:43

## 2019-01-15 RX ADMIN — Medication 400 MG: at 10:44

## 2019-01-15 RX ADMIN — HYDROCORTISONE: 1 CREAM TOPICAL at 12:15

## 2019-01-15 RX ADMIN — ACETAMINOPHEN 650 MG: 325 TABLET ORAL at 03:52

## 2019-01-15 RX ADMIN — FUROSEMIDE 40 MG: 40 TABLET ORAL at 12:11

## 2019-01-15 RX ADMIN — Medication 400 MG: at 21:05

## 2019-01-15 ASSESSMENT — PAIN SCALES - WONG BAKER
WONGBAKER_NUMERICALRESPONSE: 2
WONGBAKER_NUMERICALRESPONSE: 2

## 2019-01-15 ASSESSMENT — PAIN SCALES - GENERAL
PAINLEVEL_OUTOF10: 0
PAINLEVEL_OUTOF10: 3
PAINLEVEL_OUTOF10: 0

## 2019-01-16 LAB
GLUCOSE BLD-MCNC: 126 MG/DL (ref 60–115)
GLUCOSE BLD-MCNC: 127 MG/DL (ref 60–115)
GLUCOSE BLD-MCNC: 184 MG/DL (ref 60–115)
GLUCOSE BLD-MCNC: 95 MG/DL (ref 60–115)
GLUCOSE BLD-MCNC: 98 MG/DL (ref 60–115)
PERFORMED ON: ABNORMAL
PERFORMED ON: NORMAL
PERFORMED ON: NORMAL

## 2019-01-16 PROCEDURE — 6370000000 HC RX 637 (ALT 250 FOR IP): Performed by: PHYSICAL MEDICINE & REHABILITATION

## 2019-01-16 PROCEDURE — 97110 THERAPEUTIC EXERCISES: CPT

## 2019-01-16 PROCEDURE — 97112 NEUROMUSCULAR REEDUCATION: CPT

## 2019-01-16 PROCEDURE — 97535 SELF CARE MNGMENT TRAINING: CPT

## 2019-01-16 PROCEDURE — 6360000002 HC RX W HCPCS: Performed by: PHYSICAL MEDICINE & REHABILITATION

## 2019-01-16 PROCEDURE — 97127 HC OT THER IVNTJ W/FOCUS COG FUNCJ: CPT

## 2019-01-16 PROCEDURE — 97530 THERAPEUTIC ACTIVITIES: CPT

## 2019-01-16 PROCEDURE — 6370000000 HC RX 637 (ALT 250 FOR IP): Performed by: INTERNAL MEDICINE

## 2019-01-16 PROCEDURE — 97116 GAIT TRAINING THERAPY: CPT

## 2019-01-16 PROCEDURE — 99231 SBSQ HOSP IP/OBS SF/LOW 25: CPT | Performed by: PHYSICAL MEDICINE & REHABILITATION

## 2019-01-16 PROCEDURE — 97127 HC SP THER IVNTJ W/FOCUS COG FUNCJ: CPT

## 2019-01-16 PROCEDURE — 1180000000 HC REHAB R&B

## 2019-01-16 RX ORDER — CYANOCOBALAMIN 1000 UG/ML
1000 INJECTION INTRAMUSCULAR; SUBCUTANEOUS WEEKLY
Status: DISCONTINUED | OUTPATIENT
Start: 2019-01-16 | End: 2019-01-21 | Stop reason: HOSPADM

## 2019-01-16 RX ORDER — UBIDECARENONE 100 MG
100 CAPSULE ORAL
Status: DISCONTINUED | OUTPATIENT
Start: 2019-01-16 | End: 2019-01-21 | Stop reason: HOSPADM

## 2019-01-16 RX ADMIN — NITROFURANTOIN (MONOHYDRATE/MACROCRYSTALS) 100 MG: 75; 25 CAPSULE ORAL at 08:39

## 2019-01-16 RX ADMIN — ALLOPURINOL 100 MG: 100 TABLET ORAL at 08:39

## 2019-01-16 RX ADMIN — FUROSEMIDE 40 MG: 40 TABLET ORAL at 08:39

## 2019-01-16 RX ADMIN — NITROFURANTOIN (MONOHYDRATE/MACROCRYSTALS) 100 MG: 75; 25 CAPSULE ORAL at 20:10

## 2019-01-16 RX ADMIN — Medication 100 MG: at 15:11

## 2019-01-16 RX ADMIN — Medication 400 MG: at 08:39

## 2019-01-16 RX ADMIN — CYANOCOBALAMIN 1000 MCG: 1000 INJECTION, SOLUTION INTRAMUSCULAR; SUBCUTANEOUS at 15:11

## 2019-01-16 RX ADMIN — INSULIN GLARGINE 15 UNITS: 100 INJECTION, SOLUTION SUBCUTANEOUS at 20:09

## 2019-01-16 RX ADMIN — RIVAROXABAN 20 MG: 20 TABLET, FILM COATED ORAL at 16:57

## 2019-01-16 RX ADMIN — VITAMIN D, TAB 1000IU (100/BT) 1000 UNITS: 25 TAB at 15:11

## 2019-01-16 RX ADMIN — LEVOTHYROXINE SODIUM 75 MCG: 75 TABLET ORAL at 05:50

## 2019-01-16 RX ADMIN — HYDROCORTISONE: 1 CREAM TOPICAL at 20:24

## 2019-01-16 RX ADMIN — Medication 400 MG: at 20:11

## 2019-01-16 RX ADMIN — LOSARTAN POTASSIUM 25 MG: 25 TABLET, FILM COATED ORAL at 08:39

## 2019-01-16 RX ADMIN — HYDROCORTISONE: 1 CREAM TOPICAL at 08:41

## 2019-01-16 ASSESSMENT — PAIN SCALES - GENERAL
PAINLEVEL_OUTOF10: 0
PAINLEVEL_OUTOF10: 0

## 2019-01-16 ASSESSMENT — PAIN DESCRIPTION - LOCATION: LOCATION: GENERALIZED

## 2019-01-16 ASSESSMENT — PAIN SCALES - WONG BAKER: WONGBAKER_NUMERICALRESPONSE: 2

## 2019-01-17 LAB
GLUCOSE BLD-MCNC: 101 MG/DL (ref 60–115)
GLUCOSE BLD-MCNC: 114 MG/DL (ref 60–115)
GLUCOSE BLD-MCNC: 128 MG/DL (ref 60–115)
GLUCOSE BLD-MCNC: 139 MG/DL (ref 60–115)
PERFORMED ON: ABNORMAL
PERFORMED ON: ABNORMAL
PERFORMED ON: NORMAL
PERFORMED ON: NORMAL

## 2019-01-17 PROCEDURE — 97110 THERAPEUTIC EXERCISES: CPT

## 2019-01-17 PROCEDURE — 97150 GROUP THERAPEUTIC PROCEDURES: CPT

## 2019-01-17 PROCEDURE — 97116 GAIT TRAINING THERAPY: CPT

## 2019-01-17 PROCEDURE — 6370000000 HC RX 637 (ALT 250 FOR IP): Performed by: PHYSICAL MEDICINE & REHABILITATION

## 2019-01-17 PROCEDURE — 97535 SELF CARE MNGMENT TRAINING: CPT

## 2019-01-17 PROCEDURE — 6370000000 HC RX 637 (ALT 250 FOR IP): Performed by: INTERNAL MEDICINE

## 2019-01-17 PROCEDURE — 1180000000 HC REHAB R&B

## 2019-01-17 PROCEDURE — 97127 HC SP THER IVNTJ W/FOCUS COG FUNCJ: CPT

## 2019-01-17 PROCEDURE — 97530 THERAPEUTIC ACTIVITIES: CPT

## 2019-01-17 PROCEDURE — 99231 SBSQ HOSP IP/OBS SF/LOW 25: CPT | Performed by: PHYSICAL MEDICINE & REHABILITATION

## 2019-01-17 RX ADMIN — VITAMIN D, TAB 1000IU (100/BT) 1000 UNITS: 25 TAB at 12:31

## 2019-01-17 RX ADMIN — INSULIN GLARGINE 15 UNITS: 100 INJECTION, SOLUTION SUBCUTANEOUS at 22:38

## 2019-01-17 RX ADMIN — NITROFURANTOIN (MONOHYDRATE/MACROCRYSTALS) 100 MG: 75; 25 CAPSULE ORAL at 20:50

## 2019-01-17 RX ADMIN — HYDROCORTISONE: 1 CREAM TOPICAL at 09:07

## 2019-01-17 RX ADMIN — Medication 400 MG: at 20:50

## 2019-01-17 RX ADMIN — ALLOPURINOL 100 MG: 100 TABLET ORAL at 09:03

## 2019-01-17 RX ADMIN — Medication 100 MG: at 12:31

## 2019-01-17 RX ADMIN — Medication 100 MG: at 09:04

## 2019-01-17 RX ADMIN — MAGNESIUM HYDROXIDE 30 ML: 400 SUSPENSION ORAL at 20:51

## 2019-01-17 RX ADMIN — LEVOTHYROXINE SODIUM 75 MCG: 75 TABLET ORAL at 06:54

## 2019-01-17 RX ADMIN — NITROFURANTOIN (MONOHYDRATE/MACROCRYSTALS) 100 MG: 75; 25 CAPSULE ORAL at 09:03

## 2019-01-17 RX ADMIN — LOSARTAN POTASSIUM 25 MG: 25 TABLET, FILM COATED ORAL at 09:05

## 2019-01-17 RX ADMIN — RIVAROXABAN 20 MG: 20 TABLET, FILM COATED ORAL at 17:51

## 2019-01-17 RX ADMIN — Medication 400 MG: at 09:04

## 2019-01-17 RX ADMIN — FUROSEMIDE 40 MG: 40 TABLET ORAL at 09:04

## 2019-01-17 RX ADMIN — HYDROCORTISONE: 1 CREAM TOPICAL at 20:53

## 2019-01-17 ASSESSMENT — PAIN DESCRIPTION - PAIN TYPE: TYPE: CHRONIC PAIN

## 2019-01-17 ASSESSMENT — PAIN SCALES - GENERAL: PAINLEVEL_OUTOF10: 0

## 2019-01-17 ASSESSMENT — PAIN DESCRIPTION - LOCATION: LOCATION: GENERALIZED

## 2019-01-17 ASSESSMENT — PAIN SCALES - WONG BAKER: WONGBAKER_NUMERICALRESPONSE: 2

## 2019-01-18 LAB
GLUCOSE BLD-MCNC: 103 MG/DL (ref 60–115)
GLUCOSE BLD-MCNC: 113 MG/DL (ref 60–115)
GLUCOSE BLD-MCNC: 125 MG/DL (ref 60–115)
GLUCOSE BLD-MCNC: 170 MG/DL (ref 60–115)
PERFORMED ON: ABNORMAL
PERFORMED ON: ABNORMAL
PERFORMED ON: NORMAL
PERFORMED ON: NORMAL

## 2019-01-18 PROCEDURE — 6370000000 HC RX 637 (ALT 250 FOR IP): Performed by: PHYSICAL MEDICINE & REHABILITATION

## 2019-01-18 PROCEDURE — 1180000000 HC REHAB R&B

## 2019-01-18 PROCEDURE — 97535 SELF CARE MNGMENT TRAINING: CPT

## 2019-01-18 PROCEDURE — 97530 THERAPEUTIC ACTIVITIES: CPT

## 2019-01-18 PROCEDURE — 97116 GAIT TRAINING THERAPY: CPT

## 2019-01-18 PROCEDURE — 6370000000 HC RX 637 (ALT 250 FOR IP): Performed by: INTERNAL MEDICINE

## 2019-01-18 PROCEDURE — 99231 SBSQ HOSP IP/OBS SF/LOW 25: CPT | Performed by: PHYSICAL MEDICINE & REHABILITATION

## 2019-01-18 PROCEDURE — 97110 THERAPEUTIC EXERCISES: CPT

## 2019-01-18 RX ADMIN — VITAMIN D, TAB 1000IU (100/BT) 1000 UNITS: 25 TAB at 12:36

## 2019-01-18 RX ADMIN — Medication 100 MG: at 09:07

## 2019-01-18 RX ADMIN — FUROSEMIDE 40 MG: 40 TABLET ORAL at 09:07

## 2019-01-18 RX ADMIN — NITROFURANTOIN (MONOHYDRATE/MACROCRYSTALS) 100 MG: 75; 25 CAPSULE ORAL at 20:10

## 2019-01-18 RX ADMIN — Medication 400 MG: at 09:07

## 2019-01-18 RX ADMIN — ALLOPURINOL 100 MG: 100 TABLET ORAL at 09:07

## 2019-01-18 RX ADMIN — Medication 100 MG: at 12:35

## 2019-01-18 RX ADMIN — Medication 400 MG: at 20:10

## 2019-01-18 RX ADMIN — HYDROCORTISONE: 1 CREAM TOPICAL at 09:08

## 2019-01-18 RX ADMIN — HYDROCORTISONE: 1 CREAM TOPICAL at 20:18

## 2019-01-18 RX ADMIN — NITROFURANTOIN (MONOHYDRATE/MACROCRYSTALS) 100 MG: 75; 25 CAPSULE ORAL at 09:07

## 2019-01-18 RX ADMIN — LOSARTAN POTASSIUM 25 MG: 25 TABLET, FILM COATED ORAL at 09:07

## 2019-01-18 RX ADMIN — LEVOTHYROXINE SODIUM 75 MCG: 75 TABLET ORAL at 06:29

## 2019-01-18 RX ADMIN — INSULIN GLARGINE 15 UNITS: 100 INJECTION, SOLUTION SUBCUTANEOUS at 20:09

## 2019-01-18 RX ADMIN — RIVAROXABAN 20 MG: 20 TABLET, FILM COATED ORAL at 17:44

## 2019-01-18 ASSESSMENT — PAIN DESCRIPTION - LOCATION: LOCATION: GENERALIZED;KNEE;SHOULDER

## 2019-01-18 ASSESSMENT — PAIN SCALES - GENERAL
PAINLEVEL_OUTOF10: 0

## 2019-01-18 ASSESSMENT — PAIN SCALES - WONG BAKER: WONGBAKER_NUMERICALRESPONSE: 2

## 2019-01-18 ASSESSMENT — PAIN DESCRIPTION - ORIENTATION: ORIENTATION: RIGHT;LEFT

## 2019-01-19 LAB
GLUCOSE BLD-MCNC: 104 MG/DL (ref 60–115)
GLUCOSE BLD-MCNC: 114 MG/DL (ref 60–115)
GLUCOSE BLD-MCNC: 198 MG/DL (ref 60–115)
GLUCOSE BLD-MCNC: 233 MG/DL (ref 60–115)
PERFORMED ON: ABNORMAL
PERFORMED ON: ABNORMAL
PERFORMED ON: NORMAL
PERFORMED ON: NORMAL

## 2019-01-19 PROCEDURE — 6370000000 HC RX 637 (ALT 250 FOR IP): Performed by: INTERNAL MEDICINE

## 2019-01-19 PROCEDURE — 1180000000 HC REHAB R&B

## 2019-01-19 PROCEDURE — 97116 GAIT TRAINING THERAPY: CPT

## 2019-01-19 PROCEDURE — 99232 SBSQ HOSP IP/OBS MODERATE 35: CPT | Performed by: PHYSICAL MEDICINE & REHABILITATION

## 2019-01-19 PROCEDURE — 97530 THERAPEUTIC ACTIVITIES: CPT

## 2019-01-19 PROCEDURE — 97110 THERAPEUTIC EXERCISES: CPT

## 2019-01-19 PROCEDURE — 6370000000 HC RX 637 (ALT 250 FOR IP): Performed by: PHYSICAL MEDICINE & REHABILITATION

## 2019-01-19 RX ADMIN — NITROFURANTOIN (MONOHYDRATE/MACROCRYSTALS) 100 MG: 75; 25 CAPSULE ORAL at 20:05

## 2019-01-19 RX ADMIN — ALLOPURINOL 100 MG: 100 TABLET ORAL at 08:51

## 2019-01-19 RX ADMIN — LEVOTHYROXINE SODIUM 75 MCG: 75 TABLET ORAL at 05:57

## 2019-01-19 RX ADMIN — FUROSEMIDE 40 MG: 40 TABLET ORAL at 08:52

## 2019-01-19 RX ADMIN — NITROFURANTOIN (MONOHYDRATE/MACROCRYSTALS) 100 MG: 75; 25 CAPSULE ORAL at 08:51

## 2019-01-19 RX ADMIN — VITAMIN D, TAB 1000IU (100/BT) 1000 UNITS: 25 TAB at 12:51

## 2019-01-19 RX ADMIN — HYDROCORTISONE: 1 CREAM TOPICAL at 20:09

## 2019-01-19 RX ADMIN — INSULIN GLARGINE 15 UNITS: 100 INJECTION, SOLUTION SUBCUTANEOUS at 20:05

## 2019-01-19 RX ADMIN — RIVAROXABAN 20 MG: 20 TABLET, FILM COATED ORAL at 17:29

## 2019-01-19 RX ADMIN — Medication 100 MG: at 08:51

## 2019-01-19 RX ADMIN — Medication 400 MG: at 08:50

## 2019-01-19 RX ADMIN — Medication 100 MG: at 12:51

## 2019-01-19 RX ADMIN — Medication 400 MG: at 20:05

## 2019-01-19 RX ADMIN — HYDROCORTISONE: 1 CREAM TOPICAL at 08:53

## 2019-01-19 RX ADMIN — LOSARTAN POTASSIUM 25 MG: 25 TABLET, FILM COATED ORAL at 08:52

## 2019-01-19 ASSESSMENT — PAIN SCALES - WONG BAKER: WONGBAKER_NUMERICALRESPONSE: 0

## 2019-01-19 ASSESSMENT — PAIN SCALES - GENERAL
PAINLEVEL_OUTOF10: 0

## 2019-01-20 LAB
BILIRUBIN URINE: NEGATIVE
BLOOD, URINE: NEGATIVE
CLARITY: ABNORMAL
COLOR: YELLOW
GLUCOSE BLD-MCNC: 109 MG/DL (ref 60–115)
GLUCOSE BLD-MCNC: 124 MG/DL (ref 60–115)
GLUCOSE BLD-MCNC: 126 MG/DL (ref 60–115)
GLUCOSE BLD-MCNC: 147 MG/DL (ref 60–115)
GLUCOSE URINE: NEGATIVE MG/DL
KETONES, URINE: NEGATIVE MG/DL
LEUKOCYTE ESTERASE, URINE: NEGATIVE
NITRITE, URINE: NEGATIVE
PERFORMED ON: ABNORMAL
PERFORMED ON: NORMAL
PH UA: 8 (ref 5–9)
PROTEIN UA: NEGATIVE MG/DL
SPECIFIC GRAVITY UA: 1.01 (ref 1–1.03)
UROBILINOGEN, URINE: 0.2 E.U./DL

## 2019-01-20 PROCEDURE — 99232 SBSQ HOSP IP/OBS MODERATE 35: CPT | Performed by: PHYSICAL MEDICINE & REHABILITATION

## 2019-01-20 PROCEDURE — 1180000000 HC REHAB R&B

## 2019-01-20 PROCEDURE — 6370000000 HC RX 637 (ALT 250 FOR IP): Performed by: PHYSICAL MEDICINE & REHABILITATION

## 2019-01-20 PROCEDURE — 81003 URINALYSIS AUTO W/O SCOPE: CPT

## 2019-01-20 PROCEDURE — 97116 GAIT TRAINING THERAPY: CPT

## 2019-01-20 PROCEDURE — 6370000000 HC RX 637 (ALT 250 FOR IP): Performed by: INTERNAL MEDICINE

## 2019-01-20 PROCEDURE — 97535 SELF CARE MNGMENT TRAINING: CPT

## 2019-01-20 RX ADMIN — HYDROCORTISONE: 1 CREAM TOPICAL at 22:25

## 2019-01-20 RX ADMIN — RIVAROXABAN 20 MG: 20 TABLET, FILM COATED ORAL at 18:54

## 2019-01-20 RX ADMIN — FUROSEMIDE 40 MG: 40 TABLET ORAL at 07:21

## 2019-01-20 RX ADMIN — INSULIN GLARGINE 15 UNITS: 100 INJECTION, SOLUTION SUBCUTANEOUS at 21:14

## 2019-01-20 RX ADMIN — LEVOTHYROXINE SODIUM 75 MCG: 75 TABLET ORAL at 05:18

## 2019-01-20 RX ADMIN — Medication 400 MG: at 07:21

## 2019-01-20 RX ADMIN — HYDROCORTISONE: 1 CREAM TOPICAL at 07:22

## 2019-01-20 RX ADMIN — NITROFURANTOIN (MONOHYDRATE/MACROCRYSTALS) 100 MG: 75; 25 CAPSULE ORAL at 07:21

## 2019-01-20 RX ADMIN — LOSARTAN POTASSIUM 25 MG: 25 TABLET, FILM COATED ORAL at 07:21

## 2019-01-20 RX ADMIN — VITAMIN D, TAB 1000IU (100/BT) 1000 UNITS: 25 TAB at 12:19

## 2019-01-20 RX ADMIN — Medication 100 MG: at 12:19

## 2019-01-20 RX ADMIN — Medication 400 MG: at 21:14

## 2019-01-20 RX ADMIN — NITROFURANTOIN (MONOHYDRATE/MACROCRYSTALS) 100 MG: 75; 25 CAPSULE ORAL at 21:14

## 2019-01-20 RX ADMIN — ALLOPURINOL 100 MG: 100 TABLET ORAL at 07:21

## 2019-01-20 RX ADMIN — Medication 100 MG: at 07:21

## 2019-01-20 ASSESSMENT — PAIN SCALES - GENERAL: PAINLEVEL_OUTOF10: 0

## 2019-01-21 VITALS
HEIGHT: 62 IN | RESPIRATION RATE: 16 BRPM | TEMPERATURE: 97 F | SYSTOLIC BLOOD PRESSURE: 117 MMHG | OXYGEN SATURATION: 95 % | DIASTOLIC BLOOD PRESSURE: 66 MMHG | WEIGHT: 195.77 LBS | BODY MASS INDEX: 36.03 KG/M2 | HEART RATE: 64 BPM

## 2019-01-21 LAB
GLUCOSE BLD-MCNC: 110 MG/DL (ref 60–115)
GLUCOSE BLD-MCNC: 158 MG/DL (ref 60–115)
PERFORMED ON: ABNORMAL
PERFORMED ON: NORMAL

## 2019-01-21 PROCEDURE — 99239 HOSP IP/OBS DSCHRG MGMT >30: CPT | Performed by: PHYSICAL MEDICINE & REHABILITATION

## 2019-01-21 PROCEDURE — 6370000000 HC RX 637 (ALT 250 FOR IP): Performed by: PHYSICAL MEDICINE & REHABILITATION

## 2019-01-21 PROCEDURE — 97535 SELF CARE MNGMENT TRAINING: CPT

## 2019-01-21 PROCEDURE — 6370000000 HC RX 637 (ALT 250 FOR IP): Performed by: INTERNAL MEDICINE

## 2019-01-21 PROCEDURE — 97110 THERAPEUTIC EXERCISES: CPT

## 2019-01-21 PROCEDURE — 97116 GAIT TRAINING THERAPY: CPT

## 2019-01-21 PROCEDURE — 97530 THERAPEUTIC ACTIVITIES: CPT

## 2019-01-21 PROCEDURE — 97127 HC SP THER IVNTJ W/FOCUS COG FUNCJ: CPT

## 2019-01-21 RX ORDER — FUROSEMIDE 40 MG/1
40 TABLET ORAL DAILY
Qty: 60 TABLET | Refills: 3 | Status: SHIPPED
Start: 2019-01-22 | End: 2019-05-31 | Stop reason: SDUPTHER

## 2019-01-21 RX ORDER — LOSARTAN POTASSIUM 25 MG/1
25 TABLET ORAL DAILY
Qty: 90 TABLET | Refills: 2 | Status: SHIPPED
Start: 2019-01-21 | End: 2019-03-13 | Stop reason: SDUPTHER

## 2019-01-21 RX ADMIN — LEVOTHYROXINE SODIUM 75 MCG: 75 TABLET ORAL at 06:19

## 2019-01-21 RX ADMIN — LOSARTAN POTASSIUM 25 MG: 25 TABLET, FILM COATED ORAL at 07:22

## 2019-01-21 RX ADMIN — Medication 400 MG: at 07:22

## 2019-01-21 RX ADMIN — FUROSEMIDE 40 MG: 40 TABLET ORAL at 07:23

## 2019-01-21 RX ADMIN — VITAMIN D, TAB 1000IU (100/BT) 1000 UNITS: 25 TAB at 12:47

## 2019-01-21 RX ADMIN — Medication 100 MG: at 07:23

## 2019-01-21 RX ADMIN — NITROFURANTOIN (MONOHYDRATE/MACROCRYSTALS) 100 MG: 75; 25 CAPSULE ORAL at 07:23

## 2019-01-21 RX ADMIN — Medication 100 MG: at 12:47

## 2019-01-21 RX ADMIN — ALLOPURINOL 100 MG: 100 TABLET ORAL at 07:23

## 2019-01-21 ASSESSMENT — PAIN SCALES - GENERAL: PAINLEVEL_OUTOF10: 0

## 2019-01-22 ENCOUNTER — TELEPHONE (OUTPATIENT)
Dept: FAMILY MEDICINE CLINIC | Age: 84
End: 2019-01-22

## 2019-01-22 DIAGNOSIS — E11.65 TYPE 2 DIABETES MELLITUS WITH HYPERGLYCEMIA, WITHOUT LONG-TERM CURRENT USE OF INSULIN (HCC): Primary | ICD-10-CM

## 2019-01-22 RX ORDER — LANCETS 30 GAUGE
EACH MISCELLANEOUS
Qty: 400 EACH | Refills: 11 | Status: SHIPPED | OUTPATIENT
Start: 2019-01-22

## 2019-01-22 RX ORDER — GLUCOSAMINE HCL/CHONDROITIN SU 500-400 MG
CAPSULE ORAL
Qty: 400 STRIP | Refills: 11 | Status: SHIPPED | OUTPATIENT
Start: 2019-01-22

## 2019-01-25 ENCOUNTER — TELEPHONE (OUTPATIENT)
Dept: DIABETES SERVICES | Age: 84
End: 2019-01-25

## 2019-01-25 NOTE — PROGRESS NOTES
Follow up call to son Terrance Otoole post rehab stay to see if he had any questions following his mom's discharge related to Diabetes. Her glucose was 93 this am and he checks her 4x day and gives her all her meds including Basaglar 15 U at hs. Reminded him in addition to before meals he could check her 2 H PP and they should be less than 180 to see how she is responding to her meals. He ended call stating he thinks the [address] person is at the door and mom getting cranky and needs to eat. He sounded engaged and confident.  He should also have our packet and card from hospital.

## 2019-01-29 ENCOUNTER — OFFICE VISIT (OUTPATIENT)
Dept: FAMILY MEDICINE CLINIC | Age: 84
End: 2019-01-29
Payer: MEDICARE

## 2019-01-29 VITALS
TEMPERATURE: 98.5 F | BODY MASS INDEX: 36.07 KG/M2 | RESPIRATION RATE: 18 BRPM | SYSTOLIC BLOOD PRESSURE: 124 MMHG | OXYGEN SATURATION: 98 % | DIASTOLIC BLOOD PRESSURE: 60 MMHG | HEIGHT: 62 IN | HEART RATE: 80 BPM | WEIGHT: 196 LBS

## 2019-01-29 DIAGNOSIS — Z78.9 DECREASED ACTIVITIES OF DAILY LIVING (ADL): ICD-10-CM

## 2019-01-29 DIAGNOSIS — E11.65 TYPE 2 DIABETES MELLITUS WITH HYPERGLYCEMIA, WITHOUT LONG-TERM CURRENT USE OF INSULIN (HCC): ICD-10-CM

## 2019-01-29 DIAGNOSIS — E83.42 HYPOMAGNESEMIA: ICD-10-CM

## 2019-01-29 DIAGNOSIS — I48.20 CHRONIC A-FIB (HCC): ICD-10-CM

## 2019-01-29 DIAGNOSIS — Z09 HOSPITAL DISCHARGE FOLLOW-UP: ICD-10-CM

## 2019-01-29 DIAGNOSIS — F03.90 DEMENTIA WITHOUT BEHAVIORAL DISTURBANCE, UNSPECIFIED DEMENTIA TYPE: Primary | ICD-10-CM

## 2019-01-29 DIAGNOSIS — Z79.01 BLOOD THINNED DUE TO LONG-TERM ANTICOAGULANT USE: ICD-10-CM

## 2019-01-29 PROBLEM — B37.49 YEAST UTI: Status: RESOLVED | Noted: 2019-01-09 | Resolved: 2019-01-29

## 2019-01-29 PROBLEM — R26.9 ABNORMALITY OF GAIT AND MOBILITY: Status: RESOLVED | Noted: 2019-01-10 | Resolved: 2019-01-29

## 2019-01-29 PROCEDURE — 1036F TOBACCO NON-USER: CPT | Performed by: PHYSICIAN ASSISTANT

## 2019-01-29 PROCEDURE — 4040F PNEUMOC VAC/ADMIN/RCVD: CPT | Performed by: PHYSICIAN ASSISTANT

## 2019-01-29 PROCEDURE — 1101F PT FALLS ASSESS-DOCD LE1/YR: CPT | Performed by: PHYSICIAN ASSISTANT

## 2019-01-29 PROCEDURE — G8482 FLU IMMUNIZE ORDER/ADMIN: HCPCS | Performed by: PHYSICIAN ASSISTANT

## 2019-01-29 PROCEDURE — 99214 OFFICE O/P EST MOD 30 MIN: CPT | Performed by: PHYSICIAN ASSISTANT

## 2019-01-29 PROCEDURE — 1123F ACP DISCUSS/DSCN MKR DOCD: CPT | Performed by: PHYSICIAN ASSISTANT

## 2019-01-29 PROCEDURE — 1111F DSCHRG MED/CURRENT MED MERGE: CPT | Performed by: PHYSICIAN ASSISTANT

## 2019-01-29 PROCEDURE — G8427 DOCREV CUR MEDS BY ELIG CLIN: HCPCS | Performed by: PHYSICIAN ASSISTANT

## 2019-01-29 PROCEDURE — G8417 CALC BMI ABV UP PARAM F/U: HCPCS | Performed by: PHYSICIAN ASSISTANT

## 2019-01-29 PROCEDURE — G8399 PT W/DXA RESULTS DOCUMENT: HCPCS | Performed by: PHYSICIAN ASSISTANT

## 2019-01-29 PROCEDURE — 1090F PRES/ABSN URINE INCON ASSESS: CPT | Performed by: PHYSICIAN ASSISTANT

## 2019-01-29 ASSESSMENT — ENCOUNTER SYMPTOMS
COUGH: 0
ABDOMINAL DISTENTION: 0
ABDOMINAL PAIN: 0
WHEEZING: 0
TROUBLE SWALLOWING: 0
CHEST TIGHTNESS: 0
BLOOD IN STOOL: 0
STRIDOR: 0
SHORTNESS OF BREATH: 0
COLOR CHANGE: 0
NAUSEA: 0

## 2019-02-18 ENCOUNTER — TELEPHONE (OUTPATIENT)
Dept: FAMILY MEDICINE CLINIC | Age: 84
End: 2019-02-18

## 2019-02-25 ENCOUNTER — OFFICE VISIT (OUTPATIENT)
Dept: FAMILY MEDICINE CLINIC | Age: 84
End: 2019-02-25
Payer: MEDICARE

## 2019-02-25 VITALS
HEART RATE: 86 BPM | OXYGEN SATURATION: 99 % | SYSTOLIC BLOOD PRESSURE: 122 MMHG | WEIGHT: 192 LBS | RESPIRATION RATE: 14 BRPM | DIASTOLIC BLOOD PRESSURE: 74 MMHG | TEMPERATURE: 96.8 F | BODY MASS INDEX: 35.33 KG/M2 | HEIGHT: 62 IN

## 2019-02-25 DIAGNOSIS — I48.20 CHRONIC A-FIB (HCC): ICD-10-CM

## 2019-02-25 DIAGNOSIS — I10 ESSENTIAL HYPERTENSION: ICD-10-CM

## 2019-02-25 DIAGNOSIS — E87.6 HYPOKALEMIA: ICD-10-CM

## 2019-02-25 DIAGNOSIS — Z78.9 DECREASED ACTIVITIES OF DAILY LIVING (ADL): ICD-10-CM

## 2019-02-25 DIAGNOSIS — N39.0 RECURRENT UTI: Primary | ICD-10-CM

## 2019-02-25 DIAGNOSIS — E11.65 TYPE 2 DIABETES MELLITUS WITH HYPERGLYCEMIA, WITHOUT LONG-TERM CURRENT USE OF INSULIN (HCC): ICD-10-CM

## 2019-02-25 DIAGNOSIS — Z79.01 BLOOD THINNED DUE TO LONG-TERM ANTICOAGULANT USE: ICD-10-CM

## 2019-02-25 DIAGNOSIS — F03.90 DEMENTIA WITHOUT BEHAVIORAL DISTURBANCE, UNSPECIFIED DEMENTIA TYPE: ICD-10-CM

## 2019-02-25 LAB
ALBUMIN SERPL-MCNC: 3.4 G/DL (ref 3.5–4.6)
ALP BLD-CCNC: 77 U/L (ref 40–130)
ALT SERPL-CCNC: 7 U/L (ref 0–33)
ANION GAP SERPL CALCULATED.3IONS-SCNC: 12 MEQ/L (ref 9–15)
AST SERPL-CCNC: 26 U/L (ref 0–35)
BASOPHILS ABSOLUTE: 0 K/UL (ref 0–0.2)
BASOPHILS RELATIVE PERCENT: 0.6 %
BILIRUB SERPL-MCNC: 0.6 MG/DL (ref 0.2–0.7)
BUN BLDV-MCNC: 22 MG/DL (ref 8–23)
CALCIUM SERPL-MCNC: 9.4 MG/DL (ref 8.5–9.9)
CHLORIDE BLD-SCNC: 97 MEQ/L (ref 95–107)
CO2: 29 MEQ/L (ref 20–31)
CREAT SERPL-MCNC: 0.91 MG/DL (ref 0.5–0.9)
EOSINOPHILS ABSOLUTE: 0.1 K/UL (ref 0–0.7)
EOSINOPHILS RELATIVE PERCENT: 1.6 %
GFR AFRICAN AMERICAN: >60
GFR NON-AFRICAN AMERICAN: 58.9
GLOBULIN: 3.4 G/DL (ref 2.3–3.5)
GLUCOSE BLD-MCNC: 144 MG/DL (ref 70–99)
HCT VFR BLD CALC: 35.8 % (ref 37–47)
HEMOGLOBIN: 11.7 G/DL (ref 12–16)
LYMPHOCYTES ABSOLUTE: 1.5 K/UL (ref 1–4.8)
LYMPHOCYTES RELATIVE PERCENT: 22.6 %
MCH RBC QN AUTO: 28.2 PG (ref 27–31.3)
MCHC RBC AUTO-ENTMCNC: 32.6 % (ref 33–37)
MCV RBC AUTO: 86.6 FL (ref 82–100)
MONOCYTES ABSOLUTE: 0.5 K/UL (ref 0.2–0.8)
MONOCYTES RELATIVE PERCENT: 7.2 %
NEUTROPHILS ABSOLUTE: 4.6 K/UL (ref 1.4–6.5)
NEUTROPHILS RELATIVE PERCENT: 68 %
PDW BLD-RTO: 16 % (ref 11.5–14.5)
PLATELET # BLD: 180 K/UL (ref 130–400)
POTASSIUM SERPL-SCNC: 4.1 MEQ/L (ref 3.4–4.9)
RBC # BLD: 4.13 M/UL (ref 4.2–5.4)
SODIUM BLD-SCNC: 138 MEQ/L (ref 135–144)
TOTAL PROTEIN: 6.8 G/DL (ref 6.3–8)
WBC # BLD: 6.8 K/UL (ref 4.8–10.8)

## 2019-02-25 PROCEDURE — G8482 FLU IMMUNIZE ORDER/ADMIN: HCPCS | Performed by: PHYSICIAN ASSISTANT

## 2019-02-25 PROCEDURE — G8427 DOCREV CUR MEDS BY ELIG CLIN: HCPCS | Performed by: PHYSICIAN ASSISTANT

## 2019-02-25 PROCEDURE — 1036F TOBACCO NON-USER: CPT | Performed by: PHYSICIAN ASSISTANT

## 2019-02-25 PROCEDURE — G8399 PT W/DXA RESULTS DOCUMENT: HCPCS | Performed by: PHYSICIAN ASSISTANT

## 2019-02-25 PROCEDURE — 4040F PNEUMOC VAC/ADMIN/RCVD: CPT | Performed by: PHYSICIAN ASSISTANT

## 2019-02-25 PROCEDURE — G8417 CALC BMI ABV UP PARAM F/U: HCPCS | Performed by: PHYSICIAN ASSISTANT

## 2019-02-25 PROCEDURE — 99214 OFFICE O/P EST MOD 30 MIN: CPT | Performed by: PHYSICIAN ASSISTANT

## 2019-02-25 PROCEDURE — 1090F PRES/ABSN URINE INCON ASSESS: CPT | Performed by: PHYSICIAN ASSISTANT

## 2019-02-25 PROCEDURE — 1123F ACP DISCUSS/DSCN MKR DOCD: CPT | Performed by: PHYSICIAN ASSISTANT

## 2019-02-25 PROCEDURE — 1101F PT FALLS ASSESS-DOCD LE1/YR: CPT | Performed by: PHYSICIAN ASSISTANT

## 2019-02-25 PROCEDURE — 81003 URINALYSIS AUTO W/O SCOPE: CPT | Performed by: PHYSICIAN ASSISTANT

## 2019-02-25 RX ORDER — NITROFURANTOIN 25; 75 MG/1; MG/1
100 CAPSULE ORAL 2 TIMES DAILY
Qty: 20 CAPSULE | Refills: 0 | Status: SHIPPED | OUTPATIENT
Start: 2019-02-25 | End: 2019-03-07

## 2019-02-25 ASSESSMENT — PATIENT HEALTH QUESTIONNAIRE - PHQ9
SUM OF ALL RESPONSES TO PHQ QUESTIONS 1-9: 0
1. LITTLE INTEREST OR PLEASURE IN DOING THINGS: 0
SUM OF ALL RESPONSES TO PHQ9 QUESTIONS 1 & 2: 0
SUM OF ALL RESPONSES TO PHQ QUESTIONS 1-9: 0
2. FEELING DOWN, DEPRESSED OR HOPELESS: 0

## 2019-02-25 ASSESSMENT — ENCOUNTER SYMPTOMS
ABDOMINAL PAIN: 0
WHEEZING: 0
TROUBLE SWALLOWING: 0
COLOR CHANGE: 0
CHEST TIGHTNESS: 0
STRIDOR: 0
SHORTNESS OF BREATH: 0
ABDOMINAL DISTENTION: 0
COUGH: 0
BLOOD IN STOOL: 0
NAUSEA: 0

## 2019-02-26 LAB
BILIRUBIN, POC: ABNORMAL
BLOOD URINE, POC: ABNORMAL
CLARITY, POC: ABNORMAL
COLOR, POC: YELLOW
GLUCOSE URINE, POC: NEGATIVE
KETONES, POC: NEGATIVE
LEUKOCYTE EST, POC: ABNORMAL
NITRITE, POC: ABNORMAL
PH, POC: 6
PROTEIN, POC: ABNORMAL
SPECIFIC GRAVITY, POC: 1.02
UROBILINOGEN, POC: ABNORMAL

## 2019-03-13 DIAGNOSIS — E11.9 TYPE 2 DIABETES MELLITUS WITHOUT COMPLICATION, WITH LONG-TERM CURRENT USE OF INSULIN (HCC): ICD-10-CM

## 2019-03-13 DIAGNOSIS — N39.0 RECURRENT UTI: Primary | ICD-10-CM

## 2019-03-13 DIAGNOSIS — I10 ESSENTIAL HYPERTENSION: ICD-10-CM

## 2019-03-13 DIAGNOSIS — Z79.4 TYPE 2 DIABETES MELLITUS WITHOUT COMPLICATION, WITH LONG-TERM CURRENT USE OF INSULIN (HCC): ICD-10-CM

## 2019-03-13 LAB
BILIRUBIN, POC: NORMAL
BLOOD URINE, POC: NORMAL
CLARITY, POC: NORMAL
COLOR, POC: NORMAL
GLUCOSE URINE, POC: NORMAL
KETONES, POC: NORMAL
LEUKOCYTE EST, POC: NORMAL
NITRITE, POC: NORMAL
PH, POC: 6
PROTEIN, POC: NORMAL
SPECIFIC GRAVITY, POC: 1.02
UROBILINOGEN, POC: NORMAL

## 2019-03-13 PROCEDURE — 81002 URINALYSIS NONAUTO W/O SCOPE: CPT | Performed by: PHYSICIAN ASSISTANT

## 2019-03-13 RX ORDER — LOSARTAN POTASSIUM 25 MG/1
25 TABLET ORAL DAILY
Qty: 90 TABLET | Refills: 2 | Status: SHIPPED | OUTPATIENT
Start: 2019-03-13 | End: 2020-05-22

## 2019-11-11 DIAGNOSIS — E83.42 HYPOMAGNESEMIA: ICD-10-CM

## 2020-01-13 RX ORDER — INSULIN GLARGINE 100 [IU]/ML
INJECTION, SOLUTION SUBCUTANEOUS
Qty: 15 ML | Refills: 0 | Status: ON HOLD | OUTPATIENT
Start: 2020-01-13 | End: 2020-08-11 | Stop reason: HOSPADM

## 2020-01-13 RX ORDER — LANCETS 33 GAUGE
EACH MISCELLANEOUS
Qty: 100 EACH | Refills: 3 | Status: SHIPPED | OUTPATIENT
Start: 2020-01-13

## 2020-01-13 NOTE — TELEPHONE ENCOUNTER
Rx request   Requested Prescriptions     Pending Prescriptions Disp Refills    [facility] UNIFINE PENTLAMIN 31G X 8 MM [facility] [Pharmacy Med Name: [employer] 31 GX5/16\" DIS NEEDLE] 100 each 3     Sig: USE AS DIRECTED ONCE DAILY    BASAGLAR KWIKPEN 100 UNIT/ML injection pen [Pharmacy Med Name: Michael Pace U-100 100/ML (3) INSULN PEN] 15 mL      Sig: INJECT 15 UNITS SUBCUTANEOUSLY NIGHTLY     LOV 2/25/2019  Next Visit Date:  No future appointments.

## 2020-08-08 ENCOUNTER — APPOINTMENT (OUTPATIENT)
Dept: CT IMAGING | Age: 85
DRG: 690 | End: 2020-08-08
Payer: MEDICARE

## 2020-08-08 ENCOUNTER — HOSPITAL ENCOUNTER (INPATIENT)
Age: 85
LOS: 3 days | Discharge: NURSING, REHAB OR LONG-TERM CARE | DRG: 690 | End: 2020-08-11
Attending: INTERNAL MEDICINE | Admitting: INTERNAL MEDICINE
Payer: MEDICARE

## 2020-08-08 ENCOUNTER — APPOINTMENT (OUTPATIENT)
Dept: GENERAL RADIOLOGY | Age: 85
DRG: 690 | End: 2020-08-08
Payer: MEDICARE

## 2020-08-08 PROBLEM — N39.0 UTI (URINARY TRACT INFECTION): Status: ACTIVE | Noted: 2020-08-08

## 2020-08-08 LAB
ALBUMIN SERPL-MCNC: 3.1 G/DL (ref 3.5–4.6)
ALP BLD-CCNC: 68 U/L (ref 40–130)
ALT SERPL-CCNC: 15 U/L (ref 0–33)
ANION GAP SERPL CALCULATED.3IONS-SCNC: 15 MEQ/L (ref 9–15)
APTT: 28.8 SEC (ref 24.4–36.8)
AST SERPL-CCNC: 37 U/L (ref 0–35)
BACTERIA: ABNORMAL /HPF
BASOPHILS ABSOLUTE: 0 K/UL (ref 0–0.2)
BASOPHILS RELATIVE PERCENT: 0.3 %
BILIRUB SERPL-MCNC: 3.4 MG/DL (ref 0.2–0.7)
BILIRUBIN URINE: ABNORMAL
BLOOD, URINE: NEGATIVE
BUN BLDV-MCNC: 33 MG/DL (ref 8–23)
CALCIUM SERPL-MCNC: 9.2 MG/DL (ref 8.5–9.9)
CHLORIDE BLD-SCNC: 98 MEQ/L (ref 95–107)
CK MB: 4.4 NG/ML (ref 0–3.8)
CLARITY: ABNORMAL
CO2: 24 MEQ/L (ref 20–31)
COLOR: ABNORMAL
CREAT SERPL-MCNC: 0.74 MG/DL (ref 0.5–0.9)
CREATINE KINASE-MB INDEX: 1.5 % (ref 0–3.5)
EKG ATRIAL RATE: 84 BPM
EKG Q-T INTERVAL: 402 MS
EKG QRS DURATION: 74 MS
EKG QTC CALCULATION (BAZETT): 460 MS
EKG R AXIS: 29 DEGREES
EKG T AXIS: -6 DEGREES
EKG VENTRICULAR RATE: 79 BPM
EOSINOPHILS ABSOLUTE: 0 K/UL (ref 0–0.7)
EOSINOPHILS RELATIVE PERCENT: 0.2 %
EPITHELIAL CELLS, UA: ABNORMAL /HPF (ref 0–5)
GFR AFRICAN AMERICAN: >60
GFR NON-AFRICAN AMERICAN: >60
GLOBULIN: 2.7 G/DL (ref 2.3–3.5)
GLUCOSE BLD-MCNC: 119 MG/DL (ref 70–99)
GLUCOSE URINE: NEGATIVE MG/DL
HCT VFR BLD CALC: 40.1 % (ref 37–47)
HEMOGLOBIN: 13.2 G/DL (ref 12–16)
HYALINE CASTS: ABNORMAL /HPF (ref 0–5)
INR BLD: 1.2
KETONES, URINE: ABNORMAL MG/DL
LACTIC ACID: 1.9 MMOL/L (ref 0.5–2.2)
LEUKOCYTE ESTERASE, URINE: ABNORMAL
LYMPHOCYTES ABSOLUTE: 1.5 K/UL (ref 1–4.8)
LYMPHOCYTES RELATIVE PERCENT: 15.9 %
MAGNESIUM: 1.7 MG/DL (ref 1.7–2.4)
MCH RBC QN AUTO: 27.7 PG (ref 27–31.3)
MCHC RBC AUTO-ENTMCNC: 32.9 % (ref 33–37)
MCV RBC AUTO: 84.2 FL (ref 82–100)
MONOCYTES ABSOLUTE: 0.7 K/UL (ref 0.2–0.8)
MONOCYTES RELATIVE PERCENT: 7.6 %
NEUTROPHILS ABSOLUTE: 7.2 K/UL (ref 1.4–6.5)
NEUTROPHILS RELATIVE PERCENT: 76 %
NITRITE, URINE: POSITIVE
PDW BLD-RTO: 15.4 % (ref 11.5–14.5)
PH UA: 5.5 (ref 5–9)
PLATELET # BLD: 150 K/UL (ref 130–400)
POTASSIUM SERPL-SCNC: 3.5 MEQ/L (ref 3.4–4.9)
PRO-BNP: 2641 PG/ML
PROTEIN UA: ABNORMAL MG/DL
PROTHROMBIN TIME: 14.8 SEC (ref 12.3–14.9)
RBC # BLD: 4.76 M/UL (ref 4.2–5.4)
RBC UA: ABNORMAL /HPF (ref 0–2)
RENAL EPITHELIAL, UA: ABNORMAL /HPF
SODIUM BLD-SCNC: 137 MEQ/L (ref 135–144)
SPECIFIC GRAVITY UA: 1.02 (ref 1–1.03)
TOTAL CK: 286 U/L (ref 0–170)
TOTAL PROTEIN: 5.8 G/DL (ref 6.3–8)
TROPONIN: <0.01 NG/ML (ref 0–0.01)
URINE REFLEX TO CULTURE: YES
UROBILINOGEN, URINE: 2 E.U./DL
WBC # BLD: 9.5 K/UL (ref 4.8–10.8)
WBC UA: ABNORMAL /HPF (ref 0–5)

## 2020-08-08 PROCEDURE — 72125 CT NECK SPINE W/O DYE: CPT

## 2020-08-08 PROCEDURE — 87186 SC STD MICRODIL/AGAR DIL: CPT

## 2020-08-08 PROCEDURE — 83880 ASSAY OF NATRIURETIC PEPTIDE: CPT

## 2020-08-08 PROCEDURE — 71045 X-RAY EXAM CHEST 1 VIEW: CPT

## 2020-08-08 PROCEDURE — 87149 DNA/RNA DIRECT PROBE: CPT

## 2020-08-08 PROCEDURE — 96365 THER/PROPH/DIAG IV INF INIT: CPT

## 2020-08-08 PROCEDURE — 83735 ASSAY OF MAGNESIUM: CPT

## 2020-08-08 PROCEDURE — 36415 COLL VENOUS BLD VENIPUNCTURE: CPT

## 2020-08-08 PROCEDURE — 82550 ASSAY OF CK (CPK): CPT

## 2020-08-08 PROCEDURE — 87077 CULTURE AEROBIC IDENTIFY: CPT

## 2020-08-08 PROCEDURE — 81001 URINALYSIS AUTO W/SCOPE: CPT

## 2020-08-08 PROCEDURE — 85730 THROMBOPLASTIN TIME PARTIAL: CPT

## 2020-08-08 PROCEDURE — 6830039000 HC L3 TRAUMA ALERT

## 2020-08-08 PROCEDURE — 99285 EMERGENCY DEPT VISIT HI MDM: CPT

## 2020-08-08 PROCEDURE — 6360000002 HC RX W HCPCS: Performed by: PHYSICIAN ASSISTANT

## 2020-08-08 PROCEDURE — 87040 BLOOD CULTURE FOR BACTERIA: CPT

## 2020-08-08 PROCEDURE — 72131 CT LUMBAR SPINE W/O DYE: CPT

## 2020-08-08 PROCEDURE — 85610 PROTHROMBIN TIME: CPT

## 2020-08-08 PROCEDURE — 2580000003 HC RX 258: Performed by: NURSE PRACTITIONER

## 2020-08-08 PROCEDURE — 83605 ASSAY OF LACTIC ACID: CPT

## 2020-08-08 PROCEDURE — 1210000000 HC MED SURG R&B

## 2020-08-08 PROCEDURE — 82553 CREATINE MB FRACTION: CPT

## 2020-08-08 PROCEDURE — 84484 ASSAY OF TROPONIN QUANT: CPT

## 2020-08-08 PROCEDURE — 6360000002 HC RX W HCPCS: Performed by: NURSE PRACTITIONER

## 2020-08-08 PROCEDURE — 70450 CT HEAD/BRAIN W/O DYE: CPT

## 2020-08-08 PROCEDURE — 72128 CT CHEST SPINE W/O DYE: CPT

## 2020-08-08 PROCEDURE — 93005 ELECTROCARDIOGRAM TRACING: CPT | Performed by: PHYSICIAN ASSISTANT

## 2020-08-08 PROCEDURE — 2580000003 HC RX 258: Performed by: PHYSICIAN ASSISTANT

## 2020-08-08 PROCEDURE — 87086 URINE CULTURE/COLONY COUNT: CPT

## 2020-08-08 PROCEDURE — 80053 COMPREHEN METABOLIC PANEL: CPT

## 2020-08-08 PROCEDURE — 70486 CT MAXILLOFACIAL W/O DYE: CPT

## 2020-08-08 PROCEDURE — 85025 COMPLETE CBC W/AUTO DIFF WBC: CPT

## 2020-08-08 RX ORDER — POLYETHYLENE GLYCOL 3350 17 G/17G
17 POWDER, FOR SOLUTION ORAL DAILY PRN
Status: DISCONTINUED | OUTPATIENT
Start: 2020-08-08 | End: 2020-08-11 | Stop reason: HOSPADM

## 2020-08-08 RX ORDER — ACETAMINOPHEN 325 MG/1
650 TABLET ORAL EVERY 6 HOURS PRN
Status: DISCONTINUED | OUTPATIENT
Start: 2020-08-08 | End: 2020-08-11 | Stop reason: HOSPADM

## 2020-08-08 RX ORDER — ALLOPURINOL 100 MG/1
100 TABLET ORAL DAILY
Status: DISCONTINUED | OUTPATIENT
Start: 2020-08-09 | End: 2020-08-11 | Stop reason: HOSPADM

## 2020-08-08 RX ORDER — PROMETHAZINE HYDROCHLORIDE 12.5 MG/1
12.5 TABLET ORAL EVERY 6 HOURS PRN
Status: DISCONTINUED | OUTPATIENT
Start: 2020-08-08 | End: 2020-08-11 | Stop reason: HOSPADM

## 2020-08-08 RX ORDER — ACETAMINOPHEN 650 MG/1
650 SUPPOSITORY RECTAL EVERY 6 HOURS PRN
Status: DISCONTINUED | OUTPATIENT
Start: 2020-08-08 | End: 2020-08-11 | Stop reason: HOSPADM

## 2020-08-08 RX ORDER — ONDANSETRON 2 MG/ML
4 INJECTION INTRAMUSCULAR; INTRAVENOUS EVERY 6 HOURS PRN
Status: DISCONTINUED | OUTPATIENT
Start: 2020-08-08 | End: 2020-08-11 | Stop reason: HOSPADM

## 2020-08-08 RX ORDER — SODIUM CHLORIDE 9 MG/ML
INJECTION, SOLUTION INTRAVENOUS CONTINUOUS
Status: DISCONTINUED | OUTPATIENT
Start: 2020-08-08 | End: 2020-08-11

## 2020-08-08 RX ORDER — LEVOTHYROXINE SODIUM 0.07 MG/1
75 TABLET ORAL DAILY
Status: DISCONTINUED | OUTPATIENT
Start: 2020-08-09 | End: 2020-08-11 | Stop reason: HOSPADM

## 2020-08-08 RX ORDER — LOSARTAN POTASSIUM 25 MG/1
25 TABLET ORAL DAILY
Status: DISCONTINUED | OUTPATIENT
Start: 2020-08-09 | End: 2020-08-11 | Stop reason: HOSPADM

## 2020-08-08 RX ORDER — SODIUM CHLORIDE 0.9 % (FLUSH) 0.9 %
10 SYRINGE (ML) INJECTION PRN
Status: DISCONTINUED | OUTPATIENT
Start: 2020-08-08 | End: 2020-08-11 | Stop reason: HOSPADM

## 2020-08-08 RX ORDER — SODIUM CHLORIDE 0.9 % (FLUSH) 0.9 %
10 SYRINGE (ML) INJECTION EVERY 12 HOURS SCHEDULED
Status: DISCONTINUED | OUTPATIENT
Start: 2020-08-08 | End: 2020-08-11 | Stop reason: HOSPADM

## 2020-08-08 RX ADMIN — SODIUM CHLORIDE: 9 INJECTION, SOLUTION INTRAVENOUS at 14:46

## 2020-08-08 RX ADMIN — CEFTRIAXONE SODIUM 1 G: 1 INJECTION, POWDER, FOR SOLUTION INTRAMUSCULAR; INTRAVENOUS at 20:51

## 2020-08-08 RX ADMIN — CEFTRIAXONE SODIUM 1 G: 1 INJECTION, POWDER, FOR SOLUTION INTRAMUSCULAR; INTRAVENOUS at 15:14

## 2020-08-08 RX ADMIN — ENOXAPARIN SODIUM 40 MG: 40 INJECTION SUBCUTANEOUS at 20:51

## 2020-08-08 ASSESSMENT — PAIN DESCRIPTION - PAIN TYPE: TYPE: ACUTE PAIN

## 2020-08-08 ASSESSMENT — PAIN SCALES - GENERAL: PAINLEVEL_OUTOF10: 3

## 2020-08-08 ASSESSMENT — PAIN DESCRIPTION - LOCATION: LOCATION: SHOULDER

## 2020-08-08 NOTE — ED TRIAGE NOTES
Pt arrived by squad from home with family. Pt has fallen multiple times in the last 3 days, contusions on face, arms and body. Pt states she does not know if she passed out, pt is A&Ox1. Pt was found by EMS crawling around on the floor.

## 2020-08-08 NOTE — ED NOTES
Bed: 22  Expected date: 8/8/20  Expected time:   Means of arrival:   Comments:  80 female multiple falls over last few days,    left shoulder deformity     Meek Lopez RN  08/08/20 [id number]

## 2020-08-08 NOTE — H&P
Hospitalist History and Physical  Name: Adolfo Brink  Age: 80 y.o. Gender: female  CodeStatus: Prior  Allergies: No Known Allergies    Chief Complaint:Fall (alterned mental status )    Primary Care Provider: Daisy Ball PA-C  InpatientTreatment Team: Treatment Team: Physician Assistant: Kristy Ashby PA-C; Patient Care Tech: Lori Quiet  Admission Date: 8/8/2020      Subjective: Patient is an 79 y/o female with history of PAF, HTN, HLD, hypothyroid, DM2, anemia who presented to the emergency room with change of mental status and frequent falls. Patient is pleasantly confused, alert and oriented to self only, unable to provide history. Multiple contusions and abrasions noted to face, torso, and extremities. Unclear history on who called EMS. No family present with patient. Multiple attempts to call son with no success. Physical Exam  Constitutional:       General: She is awake. Appearance: She is obese. Comments: Confused    HENT:      Head: Normocephalic. Right Ear: External ear normal.      Left Ear: External ear normal.      Nose: Nose normal.      Mouth/Throat:      Mouth: Mucous membranes are dry. Pharynx: Oropharynx is clear. Eyes:      Conjunctiva/sclera:      Right eye: Exudate present. Left eye: Exudate present. Pupils: Pupils are equal, round, and reactive to light. Neck:      Musculoskeletal: Normal range of motion. Cardiovascular:      Rate and Rhythm: Normal rate. Rhythm irregular. Pulses: Normal pulses. Heart sounds: Normal heart sounds. Pulmonary:      Effort: Pulmonary effort is normal.      Breath sounds: Normal breath sounds. Abdominal:      General: Bowel sounds are normal.      Palpations: Abdomen is soft. Musculoskeletal:         General: Swelling and tenderness present. Skin:     Capillary Refill: Capillary refill takes less than 2 seconds. Findings: Bruising present. Neurological:      General: No focal deficit present. Review of Systems   Unable to perform ROS: Mental status change       Medications:  Reviewed     No current facility-administered medications on file prior to encounter. Current Outpatient Medications on File Prior to Encounter   Medication Sig Dispense Refill    levothyroxine (SYNTHROID) 75 MCG tablet TAKE 1 TABLET BY MOUTH DAILY 30 tablet 0    Cholecalciferol (VITAMIN D3) 1.25 MG (57406 UT) CAPS TAKE 1 CAPSULE BY MOUTH ONCE A WEEK 12 capsule 1    losartan (COZAAR) 25 MG tablet Take 1 tablet by mouth daily 30 tablet 0    [facility]UG MART UNIFINE PENTIPS 31G X 8 MM MISC USE AS DIRECTED ONCE DAILY 100 each 3    BASAGLAR KWIKPEN 100 UNIT/ML injection pen INJECT 15 UNITS SUBCUTANEOUSLY NIGHTLY 15 mL 0    magnesium oxide (MAG-OX) 400 (241.3 Mg) MG TABS tablet TAKE 1 TABLET BY MOUTH TWICE DAILY 30 tablet 2    furosemide (LASIX) 40 MG tablet TAKE 1 & 1/2 (ONE AND ONE-HALF) TABLETS BY MOUTH EVERY MONDAY, WEDNESDAY & FRIDAY, and TAKE 1 TABLET EVERY Tuesday, THURSDAY, SATURDay & sun 60 tablet 1    potassium chloride (KLOR-CON M) 10 MEQ extended release tablet TAKE 1 TABLET BY MOUTH EVERY DAY IN THE MORNING 90 tablet 1    allopurinol (ZYLOPRIM) 100 MG tablet TAKE 1 TABLET BY MOUTH DAILY 30 tablet 5    rivaroxaban (XARELTO) 20 MG TABS tablet Take 1 tablet by mouth Daily with supper 30 tablet 5    Blood Glucose Monitoring Suppl (TRUE METRIX METER) w/Device KIT Use 4 times a day.  0    Blood Glucose Monitoring Suppl KIT Patient to test 4 times a day. Dx: E11.65. Please dispense the brand covered by patient's insurance. 1 kit 0    blood glucose monitor strips Patient to test 4 times daily. Dx: E11.65. Please dispense the brand that is covered by insurance. 400 strip 11    Lancets MISC Patient to test 4 times a day. Dx: E11.65. Please dispense the brand covered by patient's insurance. 400 each 11    Skin Protectants, Misc. (EUCERIN) cream Apply topically as needed for Dry Skin Apply topically as needed. Access: Stairs to enter with rails    Entrance Stairs - Number of Steps: 4 - Rails: Right    Bathroom Shower/Tub: Walk-in shower    Bathroom Equipment: Grab bars in shower    Home Equipment: Rolling walker    ADL Assistance: Needs assistance (son)    Homemaking Assistance: Needs assistance (son)    Ambulation Assistance: Independent (2ww)    Transfer Assistance: Independent    Active : No    Additional Comments: info provided by son who is primary caregiver                  Infusion Medications:    sodium chloride 100 mL/hr at 08/08/20 1446     Scheduled Medications:   PRN Meds:     Labs:   Recent Labs     08/08/20  1415   WBC 9.5   HGB 13.2   HCT 40.1        Recent Labs     08/08/20  1415      K 3.5   CL 98   CO2 24   BUN 33*   CREATININE 0.74   CALCIUM 9.2     Recent Labs     08/08/20  1415   AST 37*   ALT 15   BILITOT 3.4*   ALKPHOS 68     Recent Labs     08/08/20  1415   INR 1.2     Recent Labs     08/08/20  1415   CKTOTAL 286*   TROPONINI <0.010       Urinalysis:   Lab Results   Component Value Date    NITRU POSITIVE 08/08/2020    WBCUA 10-20 08/08/2020    BACTERIA MANY 08/08/2020    RBCUA None seen 08/08/2020    BLOODU Negative 08/08/2020    SPECGRAV 1.022 08/08/2020    GLUCOSEU Negative 08/08/2020       Radiology:   Most recent    Chest CT      WITH CONTRAST:No results found for this or any previous visit. WITHOUT CONTRAST: No results found for this or any previous visit. CXR      2-view:   Results for orders placed during the hospital encounter of 11/28/18   XR CHEST STANDARD (2 VW)    Narrative EXAMINATION: XR CHEST (2 VW)    CLINICAL HISTORY: HYPOXIA    COMPARISONS: JULY 25, 2017    FINDINGS: Patient leaning to right. Osteopenia. Cardiopericardial silhouette enlarged and unchanged. Aorta calcified. Area of increased opacity identified medially in right lower lung. Left lung clear. Impression STABLE CARDIOMEGALY. RIGHT LOWER LUNG ATELECTASIS/PNEUMONIA.         Portable: Results for orders placed during the hospital encounter of 20   XR CHEST PORTABLE    Narrative Patient MRN: [id number]    : 1935    Age:  80 years    Gender: Female    Order Date: 2020 3:20 PM.     Exam: XR CHEST PORTABLE    Number of Views: 1     Indication:  Altered mental status/fall    Comparison: 2019    Findings: Stable, enlarged cardiomediastinal silhouette with underlying central pulmonary vascular congestion, right hemidiaphragmatic elevation and thoracic aortic vascular calcifications. Airspace opacification right medial inferior lung base. Impression Impression:    1. Stable, enlarged cardiomediastinal silhouette with underlying central pulmonary vascular congestion right hemidiaphragmatic elevation. 2. Vascular calcifications thoracic aorta. 3. Nonspecific airspace disease medial inferior right lung base could be reflective of infection/infiltrate/pneumonia and/or atelectasis/scarring. 4. Please note this study does not exclude the possibility of underlying CoVid-19 viral infection and/or underlying pulmonary involvement. Echo No results found for this or any previous visit. Assessment/Plan:    Acute Cystitis with hematuria: Renal US for left adrenal as well is a solid component measuring 2.0 cm. The cystic component measures 1.8 cm and exophytic cystic of the right kidney. -IVFs,   -Pain control,   -IV antibiotics  - Awaiting urine and blood cultures. - **Obtain TWO SETS OF BLOOD CULTURES STAT IF SPIKES FEVER .4F or GREATER**    # Closed head injury:   -Given the altered mentation CT of the head, spine, were obtained and are unremarkable. Given the multiple contusions, altered mental status with unknown baseline will repeat the CT in 24 hours and hold xarelto     # Gait Instability: Rehab consulted, Fall precautions, Up with assist. PT OT. Maximize nutritional status. # Dehydration due to above: IVFs, Repeat BMP in AM. Pain control.  PRN Zofran

## 2020-08-08 NOTE — ED NOTES
Pt continues to present with confusion. Pt spoke of her grown kids at the sitters and she needs to go get them. Reoriented pt to the year and situation.       Annabelle Corral RN  08/08/20 2631

## 2020-08-08 NOTE — ED PROVIDER NOTES
[address] [facility]  EMERGENCY DEPARTMENT ENCOUNTER      Pt Name: Yang Vazquez  MRN: [id number]  Bernardgfchaparrita 1935  Date of evaluation: 8/8/2020  Provider: Taylor Robles PA-C    CHIEF COMPLAINT       Chief Complaint   Patient presents with    Fall     alterned mental status          HISTORY OF PRESENT ILLNESS   (Location/Symptom, Timing/Onset, Context/Setting, Quality, Duration, Modifying Factors, Severity)  Note limiting factors. Yang Vazquez is a 80 y.o. female who presents to the emergency department by EMS after  Patient has had multiple reported falls in the past several days. EMS states that they found the patient crawling on the ground on their arrival.  Patient is alert and oriented to self only and is unable to provide any further insight into the nature of her injuries. Patient has some bruising and abrasions to the face but denies any pain currently. HPI    Nursing Notes were reviewed. REVIEW OF SYSTEMS    (2-9 systems for level 4, 10 or more for level 5)     Review of Systems   Unable to perform ROS: Mental status change       Except as noted above the remainder of the review of systems was reviewed and negative. PAST MEDICAL HISTORY     Past Medical History:   Diagnosis Date    Anticoagulant long-term use     Arthritis     Atrial fibrillation ([facility] [address]     DJD (degenerative joint disease), cervical 1/9/2019    Hypertension     Hypothyroidism     Obesity     Type II or unspecified type diabetes mellitus without mention of complication, not stated as uncontrolled          SURGICAL HISTORY       Past Surgical History:   Procedure Laterality Date    FRACTURE SURGERY           CURRENT MEDICATIONS       Previous Medications    ALLOPURINOL (ZYLOPRIM) 100 MG TABLET    TAKE 1 TABLET BY MOUTH DAILY    BASAGLAR KWIKPEN 100 UNIT/ML INJECTION PEN    INJECT 15 UNITS SUBCUTANEOUSLY NIGHTLY    BLOOD GLUCOSE MONITOR STRIPS    Patient to test 4 times daily. Dx: E11.65.  Please dispense the brand that is covered by insurance. BLOOD GLUCOSE MONITORING SUPPL (TRUE METRIX METER) W/DEVICE KIT    Use 4 times a day. BLOOD GLUCOSE MONITORING SUPPL KIT    Patient to test 4 times a day. Dx: E11.65. Please dispense the brand covered by patient's insurance. CHOLECALCIFEROL (VITAMIN D3) 1.25 MG (86845 UT) CAPS    TAKE 1 CAPSULE BY MOUTH ONCE A WEEK    [facility] UNIFINE PENTIPS 31G X 8 MM MISC    USE AS DIRECTED ONCE DAILY    FUROSEMIDE (LASIX) 40 MG TABLET    TAKE 1 & 1/2 (ONE AND ONE-HALF) TABLETS BY MOUTH EVERY MONDAY, WEDNESDAY & FRIDAY, and TAKE 1 TABLET EVERY Tuesday, THURSDAY, SATURDay & sun    [place] [facility] [place]    Patient to test 4 times a day. Dx: E11.65. Please dispense the brand covered by patient's insurance. LEVOTHYROXINE (SYNTHROID) 75 MCG TABLET    TAKE 1 TABLET BY MOUTH DAILY    LOSARTAN (COZAAR) 25 MG TABLET    Take 1 tablet by mouth daily    MAGNESIUM OXIDE (MAG-OX) 400 (241.3 MG) MG TABS TABLET    TAKE 1 TABLET BY MOUTH TWICE DAILY    POTASSIUM CHLORIDE (KLOR-CON M) 10 MEQ EXTENDED RELEASE TABLET    TAKE 1 TABLET BY MOUTH EVERY DAY IN THE MORNING    RIVAROXABAN (XARELTO) 20 MG TABS TABLET    Take 1 tablet by mouth Daily with supper    SKIN PROTECTANTS, MISC. (EUCERIN) CREAM    Apply topically as needed for Dry Skin Apply topically as needed. ALLERGIES     Patient has no known allergies. FAMILY HISTORY       Family History   Problem Relation Age of Onset    Stroke Mother     Cancer Father     Heart Disease Brother     Heart Attack Brother           SOCIAL HISTORY       Social History     Socioeconomic History    Marital status:       Spouse name: Not on file    Number of children: Not on file    Years of education: Not on file    Highest education level: Not on file   Occupational History    Not on file   Social Needs    Financial resource strain: Not on file    Food insecurity     Worry: Not on file     Inability: Not on file    Transportation needs     Medical: Not on file     Non-medical: Not on file   Tobacco Use    Smoking status: Former Smoker    Smokeless tobacco: Never Used   Substance and Sexual Activity    Alcohol use: No    Drug use: No    Sexual activity: Never   Lifestyle    Physical activity     Days per week: Not on file     Minutes per session: Not on file    Stress: Not on file   Relationships    Social connections     Talks on phone: Not on file     Gets together: Not on file     Attends Yazdanism service: Not on file     Active member of club or organization: Not on file     Attends meetings of clubs or organizations: Not on file     Relationship status: Not on file    Intimate partner violence     Fear of current or ex partner: Not on file     Emotionally abused: Not on file     Physically abused: Not on file     Forced sexual activity: Not on file   Other Topics Concern    Not on file   Social History Narrative         Lives With: Son    Type of Home: House- One level    Home Access: Stairs to enter with rails    Entrance Stairs - Number of Steps: 4 - Rails: Right    Bathroom Shower/Tub: Walk-in shower    Bathroom Equipment: Grab bars in shower    Home Equipment: Rolling walker    ADL Assistance: Needs assistance (son)    Homemaking Assistance: Needs assistance (son)    Ambulation Assistance: Independent (2ww)    Transfer Assistance: Independent    Active : No    Additional Comments: info provided by son who is primary caregiver                 SCREENINGS        Feroz Coma Scale  Eye Opening: Spontaneous  Best Verbal Response: Oriented  Best Motor Response: Obeys commands  Powell Coma Scale Score: 15               PHYSICAL EXAM    (up to 7 for level 4, 8 or more for level 5)     ED Triage Vitals [08/08/20 1335]   BP Temp Temp Source Pulse Resp SpO2 Height Weight   134/60 97.7 °F (36.5 °C) Oral 79 16 97 % 5' 2\" (1.575 m) 192 lb (87.1 kg)       Physical Exam  Vitals signs and nursing note reviewed.    Constitutional:       General: She is not in acute distress. Appearance: She is well-developed. She is not diaphoretic. HENT:      Head: Normocephalic. Contusion present. No raccoon eyes. Mouth/Throat:      Pharynx: No oropharyngeal exudate. Eyes:      General: No scleral icterus. Conjunctiva/sclera: Conjunctivae normal.      Pupils: Pupils are equal, round, and reactive to light. Neck:      Musculoskeletal: Normal range of motion and neck supple. Trachea: No tracheal deviation. Cardiovascular:      Rate and Rhythm: Normal rate. Heart sounds: Normal heart sounds. Pulmonary:      Effort: Pulmonary effort is normal. No respiratory distress. Breath sounds: Normal breath sounds. Abdominal:      General: Bowel sounds are normal. There is no distension. Palpations: Abdomen is soft. Musculoskeletal: Normal range of motion. Left shoulder: She exhibits swelling. She exhibits normal range of motion, no tenderness, no bony tenderness, no deformity and no pain. Arms:    Skin:     General: Skin is warm and dry. Findings: No erythema or rash. Neurological:      Mental Status: She is alert. She is disoriented. Cranial Nerves: No cranial nerve deficit. Motor: No abnormal muscle tone. Psychiatric:         Behavior: Behavior normal.         Thought Content:  Thought content normal.         Judgment: Judgment normal.         DIAGNOSTIC RESULTS     EKG: All EKG's are interpreted by the Emergency Department Physician who either signs or Co-signs this chart in the absence of a cardiologist.    Atrial fibrillation, rate 79, No acute ST elevation    RADIOLOGY:   Non-plain film images such as CT, Ultrasound and MRI are read by the radiologist. Plain radiographic images are visualized and preliminarily interpreted by the emergency physician with the below findings:        Interpretation per the Radiologist below, if available at the time of this note:    CT HEAD WO CONTRAST   Final Result      NO ACUTE INTRA-AXIAL OR EXTRA-AXIAL FINDINGS. All CT scans at this facility use dose modulation, iterative reconstruction, and/or weight based dosing when appropriate to reduce radiation dose to as low as reasonably achievable. CT MAXILLOFACIAL WO CONTRAST   Final Result      NO ACUTE FRACTURES. DENTAL DISEASE IS NOTED. FOLLOW-UP      All CT scans at this facility use dose modulation, iterative reconstruction, and/or weight based dosing when appropriate to reduce radiation dose to as low as reasonably achievable. CT CERVICAL SPINE WO CONTRAST   Final Result      NO ACUTE FRACTURES. OTHER FINDINGS DETAILED ABOVE      All CT scans at this facility use dose modulation, iterative reconstruction, and/or weight based dosing when appropriate to reduce radiation dose to as low as reasonably achievable. CT THORACIC SPINE WO CONTRAST   Final Result   SOME LOSS OF HEIGHT OF THE T5 VERTEBRA. MAY REPRESENT AGE-INDETERMINATE COMPRESSION FRACTURE. NO OTHER FRACTURE         All CT scans at this facility use dose modulation, iterative reconstruction, and/or weight based dosing when appropriate to reduce radiation dose to as low as reasonably achievable. CT LUMBAR SPINE WO CONTRAST   Final Result   1. NO ACUTE FRACTURES. 2. SOLID CYSTIC LEFT ADRENAL MASS AS DESCRIBED ABOVE. DOES NOT QUALIFY AS A SIMPLE ADENOMA. THEREFORE FOLLOW-UP AND FURTHER EVALUATION WOULD BE WARRANTED. 2. AREA OF PARTIALLY EXOPHYTIC LOW-ATTENUATION IN THE RIGHT KIDNEY. LIKELY RENAL CYST BUT NOT FULLY CHARACTERIZED IN THIS NONCONTRAST STUDY. CORRELATE CLINICALLY AND FOLLOW-UP         All CT scans at this facility use dose modulation, iterative reconstruction, and/or weight based dosing when appropriate to reduce radiation dose to as low as reasonably achievable. XR CHEST PORTABLE   Final Result   Impression:     1.  Stable, enlarged cardiomediastinal silhouette with underlying central pulmonary vascular congestion right hemidiaphragmatic elevation. 2. Vascular calcifications thoracic aorta. 3. Nonspecific airspace disease medial inferior right lung base could be reflective of infection/infiltrate/pneumonia and/or atelectasis/scarring. 4. Please note this study does not exclude the possibility of underlying CoVid-19 viral infection and/or underlying pulmonary involvement. ED BEDSIDE ULTRASOUND:   Performed by ED Physician - none    LABS:  Labs Reviewed   COMPREHENSIVE METABOLIC PANEL - Abnormal; Notable for the following components:       Result Value    Glucose 119 (*)     BUN 33 (*)     Total Protein 5.8 (*)     Alb 3.1 (*)     Total Bilirubin 3.4 (*)     AST 37 (*)     All other components within normal limits   CBC WITH AUTO DIFFERENTIAL - Abnormal; Notable for the following components:    MCHC 32.9 (*)     RDW 15.4 (*)     Neutrophils Absolute 7.2 (*)     All other components within normal limits   URINE RT REFLEX TO CULTURE - Abnormal; Notable for the following components:    Color, UA ORANGE (*)     Clarity, UA CLOUDY (*)     Bilirubin Urine MODERATE (*)     Ketones, Urine TRACE (*)     Protein, UA TRACE (*)     Urobilinogen, Urine 2.0 (*)     Nitrite, Urine POSITIVE (*)     Leukocyte Esterase, Urine MODERATE (*)     All other components within normal limits   CK - Abnormal; Notable for the following components: Total  (*)     All other components within normal limits   CULTURE, BLOOD 1   CULTURE, BLOOD 2   TROPONIN   LACTIC ACID, PLASMA   BRAIN NATRIURETIC PEPTIDE   MAGNESIUM   PROTIME-INR   APTT   MICROSCOPIC URINALYSIS   CKMB & RELATIVE PERCENT       All other labs were within normal range or not returned as of this dictation.     EMERGENCY DEPARTMENT COURSE and DIFFERENTIAL DIAGNOSIS/MDM:   Vitals:    Vitals:    08/08/20 1320 08/08/20 1335 08/08/20 1507 08/08/20 1706   BP: 130/70 134/60 126/63 121/66   Pulse: 82 79 79 61   Resp: 16 16 17 20   Temp:  97.7 °F (36.5 °C) TempSrc:  Oral     SpO2: 98% 97% 99% 98%   Weight:  192 lb (87.1 kg)     Height:  5' 2\" (1.575 m)           MDM      REASSESSMENT      Patient presented emergency department from home after EMS was called due to family stating that the patient has had multiple recent falls. Patient was found to have a UTI on exam.  Given the altered mentation CT of the head, spine, were obtained which are unremarkable. Patient was found to have an elevated BUN with normal creatinine and has been receiving IV rehydration. IV Rocephin for UTI. Patient will be admitted in the hospital for further evaluation and care    CONSULTS:  None    PROCEDURES:  Unless otherwise noted below, none     Procedures        FINAL IMPRESSION      1. Acute cystitis with hematuria    2. Multiple falls    3. Closed head injury, initial encounter    4. Disorientation    5. Dehydration          DISPOSITION/PLAN   DISPOSITION Decision To Admit 08/08/2020 05:27:16 PM      PATIENT REFERRED TO:  No follow-up provider specified. DISCHARGE MEDICATIONS:  New Prescriptions    No medications on file     Controlled Substances Monitoring:     No flowsheet data found. (Please note that portions of this note were completed with a voice recognition program.  Efforts were made to edit the dictations but occasionally words are mis-transcribed.)    Nel Franks PA-C (electronically signed)  Attending Emergency Physician            Nel Franks PA-C  08/08/20 [place] [address]  08/08/20 [place] [address]  08/08/20 6712    Attending Supervisory Note/Shared Visit   I have personally performed a face to face diagnostic evaluation on this patient. I have reviewed the mid-levels findings and agree.   History and Exam by me shows altered mental status      Manolo Sahu DO  Attending Emergency Physician         Manolo Sahu DO  08/08/20 7091

## 2020-08-09 ENCOUNTER — APPOINTMENT (OUTPATIENT)
Dept: ULTRASOUND IMAGING | Age: 85
DRG: 690 | End: 2020-08-09
Payer: MEDICARE

## 2020-08-09 ENCOUNTER — APPOINTMENT (OUTPATIENT)
Dept: CT IMAGING | Age: 85
DRG: 690 | End: 2020-08-09
Payer: MEDICARE

## 2020-08-09 ENCOUNTER — APPOINTMENT (OUTPATIENT)
Dept: MRI IMAGING | Age: 85
DRG: 690 | End: 2020-08-09
Payer: MEDICARE

## 2020-08-09 LAB
ALBUMIN SERPL-MCNC: 2.9 G/DL (ref 3.5–4.6)
ALP BLD-CCNC: 63 U/L (ref 40–130)
ALT SERPL-CCNC: 14 U/L (ref 0–33)
ANION GAP SERPL CALCULATED.3IONS-SCNC: 13 MEQ/L (ref 9–15)
AST SERPL-CCNC: 33 U/L (ref 0–35)
BASOPHILS ABSOLUTE: 0 K/UL (ref 0–0.2)
BASOPHILS RELATIVE PERCENT: 0.4 %
BILIRUB SERPL-MCNC: 2.8 MG/DL (ref 0.2–0.7)
BUN BLDV-MCNC: 27 MG/DL (ref 8–23)
CALCIUM SERPL-MCNC: 8.8 MG/DL (ref 8.5–9.9)
CHLORIDE BLD-SCNC: 99 MEQ/L (ref 95–107)
CO2: 23 MEQ/L (ref 20–31)
CREAT SERPL-MCNC: 0.69 MG/DL (ref 0.5–0.9)
EOSINOPHILS ABSOLUTE: 0 K/UL (ref 0–0.7)
EOSINOPHILS RELATIVE PERCENT: 0.7 %
FOLATE: 6.9 NG/ML (ref 7.3–26.1)
GFR AFRICAN AMERICAN: >60
GFR NON-AFRICAN AMERICAN: >60
GLOBULIN: 2.9 G/DL (ref 2.3–3.5)
GLUCOSE BLD-MCNC: 127 MG/DL (ref 70–99)
HCT VFR BLD CALC: 40.5 % (ref 37–47)
HEMOGLOBIN: 13.4 G/DL (ref 12–16)
LACTIC ACID: 1.8 MMOL/L (ref 0.5–2.2)
LYMPHOCYTES ABSOLUTE: 1.2 K/UL (ref 1–4.8)
LYMPHOCYTES RELATIVE PERCENT: 17.9 %
MAGNESIUM: 1.7 MG/DL (ref 1.7–2.4)
MCH RBC QN AUTO: 28.1 PG (ref 27–31.3)
MCHC RBC AUTO-ENTMCNC: 33.2 % (ref 33–37)
MCV RBC AUTO: 84.6 FL (ref 82–100)
MONOCYTES ABSOLUTE: 0.6 K/UL (ref 0.2–0.8)
MONOCYTES RELATIVE PERCENT: 8.5 %
NEUTROPHILS ABSOLUTE: 5 K/UL (ref 1.4–6.5)
NEUTROPHILS RELATIVE PERCENT: 72.5 %
PDW BLD-RTO: 15.2 % (ref 11.5–14.5)
PLATELET # BLD: 162 K/UL (ref 130–400)
POTASSIUM REFLEX MAGNESIUM: 3.4 MEQ/L (ref 3.4–4.9)
RBC # BLD: 4.78 M/UL (ref 4.2–5.4)
SEDIMENTATION RATE, ERYTHROCYTE: 9 MM (ref 0–30)
SODIUM BLD-SCNC: 135 MEQ/L (ref 135–144)
TOTAL CK: 136 U/L (ref 0–170)
TOTAL PROTEIN: 5.8 G/DL (ref 6.3–8)
TSH SERPL DL<=0.05 MIU/L-ACNC: 6.78 UIU/ML (ref 0.44–3.86)
VITAMIN B-12: 288 PG/ML (ref 232–1245)
WBC # BLD: 7 K/UL (ref 4.8–10.8)

## 2020-08-09 PROCEDURE — 82550 ASSAY OF CK (CPK): CPT

## 2020-08-09 PROCEDURE — 85025 COMPLETE CBC W/AUTO DIFF WBC: CPT

## 2020-08-09 PROCEDURE — 85652 RBC SED RATE AUTOMATED: CPT

## 2020-08-09 PROCEDURE — 83605 ASSAY OF LACTIC ACID: CPT

## 2020-08-09 PROCEDURE — 76775 US EXAM ABDO BACK WALL LIM: CPT

## 2020-08-09 PROCEDURE — 84443 ASSAY THYROID STIM HORMONE: CPT

## 2020-08-09 PROCEDURE — 70551 MRI BRAIN STEM W/O DYE: CPT

## 2020-08-09 PROCEDURE — 80053 COMPREHEN METABOLIC PANEL: CPT

## 2020-08-09 PROCEDURE — 97162 PT EVAL MOD COMPLEX 30 MIN: CPT

## 2020-08-09 PROCEDURE — 6360000002 HC RX W HCPCS: Performed by: NURSE PRACTITIONER

## 2020-08-09 PROCEDURE — 83735 ASSAY OF MAGNESIUM: CPT

## 2020-08-09 PROCEDURE — 6370000000 HC RX 637 (ALT 250 FOR IP): Performed by: NURSE PRACTITIONER

## 2020-08-09 PROCEDURE — 82607 VITAMIN B-12: CPT

## 2020-08-09 PROCEDURE — 6370000000 HC RX 637 (ALT 250 FOR IP): Performed by: INTERNAL MEDICINE

## 2020-08-09 PROCEDURE — 70450 CT HEAD/BRAIN W/O DYE: CPT

## 2020-08-09 PROCEDURE — 99221 1ST HOSP IP/OBS SF/LOW 40: CPT | Performed by: PHYSICAL MEDICINE & REHABILITATION

## 2020-08-09 PROCEDURE — 1210000000 HC MED SURG R&B

## 2020-08-09 PROCEDURE — 72141 MRI NECK SPINE W/O DYE: CPT

## 2020-08-09 PROCEDURE — 2580000003 HC RX 258: Performed by: NURSE PRACTITIONER

## 2020-08-09 PROCEDURE — 99222 1ST HOSP IP/OBS MODERATE 55: CPT | Performed by: PSYCHIATRY & NEUROLOGY

## 2020-08-09 PROCEDURE — 82746 ASSAY OF FOLIC ACID SERUM: CPT

## 2020-08-09 PROCEDURE — 36415 COLL VENOUS BLD VENIPUNCTURE: CPT

## 2020-08-09 PROCEDURE — 6360000002 HC RX W HCPCS: Performed by: PSYCHIATRY & NEUROLOGY

## 2020-08-09 RX ORDER — LORAZEPAM 2 MG/ML
1 INJECTION INTRAMUSCULAR
Status: COMPLETED | OUTPATIENT
Start: 2020-08-09 | End: 2020-08-09

## 2020-08-09 RX ADMIN — LOSARTAN POTASSIUM 25 MG: 25 TABLET, FILM COATED ORAL at 09:22

## 2020-08-09 RX ADMIN — LORAZEPAM 1 MG: 2 INJECTION INTRAMUSCULAR; INTRAVENOUS at 15:38

## 2020-08-09 RX ADMIN — ENOXAPARIN SODIUM 40 MG: 40 INJECTION SUBCUTANEOUS at 09:21

## 2020-08-09 RX ADMIN — ALLOPURINOL 100 MG: 100 TABLET ORAL at 09:22

## 2020-08-09 RX ADMIN — CEFTRIAXONE SODIUM 1 G: 1 INJECTION, POWDER, FOR SOLUTION INTRAMUSCULAR; INTRAVENOUS at 20:29

## 2020-08-09 RX ADMIN — RIVAROXABAN 20 MG: 20 TABLET, FILM COATED ORAL at 18:08

## 2020-08-09 ASSESSMENT — ENCOUNTER SYMPTOMS
CONSTIPATION: 1
BACK PAIN: 1
SORE THROAT: 0
BACK PAIN: 0
PHOTOPHOBIA: 0
SHORTNESS OF BREATH: 0
VOMITING: 0
CHOKING: 0
EYE REDNESS: 0
BLOOD IN STOOL: 0
COUGH: 0
SHORTNESS OF BREATH: 1
NAUSEA: 0
DIARRHEA: 0
STRIDOR: 0
EYE PAIN: 0
ABDOMINAL PAIN: 0
TROUBLE SWALLOWING: 0
WHEEZING: 0
COLOR CHANGE: 0

## 2020-08-09 ASSESSMENT — PAIN SCALES - GENERAL
PAINLEVEL_OUTOF10: 0
PAINLEVEL_OUTOF10: 0

## 2020-08-09 NOTE — PROGRESS NOTES
temperature source Oral, resp. rate 18, height 5' 2\" (1.575 m), weight 192 lb (87.1 kg), SpO2 100 %, not currently breastfeeding. Subjective  Objective:  General Appearance: In no acute distress. Vital signs: (most recent): Blood pressure (!) 123/48, pulse 61, temperature 98 °F (36.7 °C), temperature source Oral, resp. rate 18, height 5' 2\" (1.575 m), weight 192 lb (87.1 kg), SpO2 100 %, not currently breastfeeding. HEENT: (+ bruising on forehead and face)    Heart: S1 normal and S2 normal.    Abdomen: Abdomen is soft. Bowel sounds are normal.   There is no epigastric area or suprapubic area tenderness. Pulses: Distal pulses are intact. Neurological: Patient is alert. Skin:  Warm and dry. Assessment & Plan  1) ?  Dementia  Neuro eval  2) cystitis w/o hematuria  C/w IV abx  Fu urine cx  3) fall  Pt/ot  4) afib hold xaralto till repeat CT head negative  5) MEKHI Ashley MD  8/9/2020

## 2020-08-09 NOTE — FLOWSHEET NOTE
Tried to contact the patient's son and daughter with no success. Left messages with both and will try again during the shift.

## 2020-08-09 NOTE — CONSULTS
uncontrolled      @PSH@  Family History   Problem Relation Age of Onset    Stroke Mother     Cancer Father     Heart Disease Brother     Heart Attack Brother      Social History     Socioeconomic History    Marital status:       Spouse name: Not on file    Number of children: Not on file    Years of education: Not on file    Highest education level: Not on file   Occupational History    Not on file   Social Needs    Financial resource strain: Not on file    Food insecurity     Worry: Not on file     Inability: Not on file    Transportation needs     Medical: Not on file     Non-medical: Not on file   Tobacco Use    Smoking status: Former Smoker    Smokeless tobacco: Never Used   Substance and Sexual Activity    Alcohol use: No    Drug use: No    Sexual activity: Never   Lifestyle    Physical activity     Days per week: Not on file     Minutes per session: Not on file    Stress: Not on file   Relationships    Social connections     Talks on phone: Not on file     Gets together: Not on file     Attends Bahai service: Not on file     Active member of club or organization: Not on file     Attends meetings of clubs or organizations: Not on file     Relationship status: Not on file    Intimate partner violence     Fear of current or ex partner: Not on file     Emotionally abused: Not on file     Physically abused: Not on file     Forced sexual activity: Not on file   Other Topics Concern    Not on file   Social History Narrative         Lives With: Son    Type of Home: House- [place] [address] [place] [address],[address] in [address] [place] [address] Access: Stairs to enter with rails    Entrance Stairs - Number of Steps: 4 - Rails: Right    Bathroom Shower/Tub: Walk-in shower    Bathroom Equipment: Grab bars in shower    Home Equipment: Rolling walker    ADL Assistance: Needs assistance (son)    Homemaking Assistance: Needs assistance (son)    Ambulation Assistance: Independent (2ww)    Transfer Assistance: Independent Active : No    Additional Comments: info provided by son who is primary caregiver                      Current Facility-Administered Medications   Medication Dose Route Frequency Provider Last Rate Last Dose    rivaroxaban (XARELTO) tablet 20 mg  20 mg Oral Dinner Caden Melo MD        0.9 % sodium chloride infusion   Intravenous Continuous Caden Melo MD 50 mL/hr at 08/08/20 2051      sodium chloride flush 0.9 % injection 10 mL  10 mL Intravenous 2 times per day Hobson Gatica, APRN - CNP        sodium chloride flush 0.9 % injection 10 mL  10 mL Intravenous PRN Hobson Gatica, APRN - CNP        acetaminophen (TYLENOL) tablet 650 mg  650 mg Oral Q6H PRN Hobson Gatica, APRN - CNP        Or    acetaminophen (TYLENOL) suppository 650 mg  650 mg Rectal Q6H PRN Hobson Gatica, APRN - CNP        polyethylene glycol (GLYCOLAX) packet 17 g  17 g Oral Daily PRN Hobson Gatica, APRN - CNP        promethazine (PHENERGAN) tablet 12.5 mg  12.5 mg Oral Q6H PRN Hobson Gatica, APRN - CNP        Or    ondansetron (ZOFRAN) injection 4 mg  4 mg Intravenous Q6H PRN Hobson Gatica, APRN - CNP        cefTRIAXone (ROCEPHIN) 1 g IVPB in 50 mL D5W minibag  1 g Intravenous Q24H Hobson Gavi, APRN - CNP   Stopped at 08/08/20 2121    allopurinol (ZYLOPRIM) tablet 100 mg  100 mg Oral Daily Joellen Cazares APRN - CNP   100 mg at 08/09/20 3214    levothyroxine (SYNTHROID) tablet 75 mcg  75 mcg Oral Daily JENNIFER Gimenez - CNP        losartan (COZAAR) tablet 25 mg  25 mg Oral Daily JENNIFER Gimenez - CNP   25 mg at 08/09/20 6176     [unfilled]  No Known Allergies     Review of Systems could not be done as patient is hard of hearing and is not cooperative.      PHYSICAL EXAMINATION:   VITAL SIGNS: BP (!) 123/48   Pulse 61   Temp 98 °F (36.7 °C) (Oral)   Resp 18   Ht 5' 2\" (1.575 m)   Wt 192 lb (87.1 kg)   SpO2 100%   BMI 35.12 kg/m²     (4) Completely disabled, unable to carry out self-care and confined to bed or chair    GENERAL: In no acute distress, Obese, well- nourished, well- developed,alert and oriented to person place and time. SKIN: Warm and dry, withoutjaundice, Has ecchymoses over extremities , forehead etc.,   HEENT: Normocephalic, Conjunctivae pinkish, sclera anicteric,   NODES: No palpable adenopathy in the neck Levels I-V, bilateral   Supraclavicular fossae, axillary chains, or inguinal regions. LUNGS: Good inspiratory effort, no accessory muscle use, clear bilaterally, no focal wheeze, rales or rhonchi. CARDIAC: Irregularly irregular , Variable S1,  No  murmurs, rubs or gallops. ABDOMINAL: Normal bowel soundspresent, soft, Obese  non-tender, no mass or  organomegaly. MUSKL:  No muscle wasting   GENITALS: Lemus catheter is in place. RECTAL: deferred  EXTREMITIES: 2+ edema of both legs noted . NEUROLOGIC:  Patient does not cooperate to evaluate neuro status. Patient is alert and speaks rarely.     LAB RESULTS:  Recent Results (from the past 24 hour(s))   Comprehensive Metabolic Panel w/ Reflex to MG    Collection Time: 08/09/20  9:55 AM   Result Value Ref Range    Sodium 135 135 - 144 mEq/L    Potassium reflex Magnesium 3.4 3.4 - 4.9 mEq/L    Chloride 99 95 - 107 mEq/L    CO2 23 20 - 31 mEq/L    Anion Gap 13 9 - 15 mEq/L    Glucose 127 (H) 70 - 99 mg/dL    BUN 27 (H) 8 - 23 mg/dL    CREATININE 0.69 0.50 - 0.90 mg/dL    GFR Non-African American >60.0 >60    GFR  >60.0 >60    Calcium 8.8 8.5 - 9.9 mg/dL    Total Protein 5.8 (L) 6.3 - 8.0 g/dL    Alb 2.9 (L) 3.5 - 4.6 g/dL    Total Bilirubin 2.8 (H) 0.2 - 0.7 mg/dL    Alkaline Phosphatase 63 40 - 130 U/L    ALT 14 0 - 33 U/L    AST 33 0 - 35 U/L    Globulin 2.9 2.3 - 3.5 g/dL   CBC auto differential    Collection Time: 08/09/20  9:55 AM   Result Value Ref Range    WBC 7.0 4.8 - 10.8 K/uL    RBC 4.78 4.20 - 5.40 M/uL    Hemoglobin 13.4 12.0 - 16.0 g/dL    Hematocrit 40.5 37.0 - 47.0 %    MCV 84.6 82.0 - 100.0 fL    MCH 28.1 27.0 - 31.3 pg    MCHC 33.2 33.0 - 37.0 %    RDW 15.2 (H) 11.5 - 14.5 %    Platelets 940 812 - 320 K/uL    Neutrophils % 72.5 %    Lymphocytes % 17.9 %    Monocytes % 8.5 %    Eosinophils % 0.7 %    Basophils % 0.4 %    Neutrophils Absolute 5.0 1.4 - 6.5 K/uL    Lymphocytes Absolute 1.2 1.0 - 4.8 K/uL    Monocytes Absolute 0.6 0.2 - 0.8 K/uL    Eosinophils Absolute 0.0 0.0 - 0.7 K/uL    Basophils Absolute 0.0 0.0 - 0.2 K/uL   Lactic acid, plasma    Collection Time: 08/09/20  9:55 AM   Result Value Ref Range    Lactic Acid 1.8 0.5 - 2.2 mmol/L   Magnesium    Collection Time: 08/09/20  9:55 AM   Result Value Ref Range    Magnesium 1.7 1.7 - 2.4 mg/dL     Recent Labs     08/08/20  1441 08/09/20  0955   COLORU ORANGE*  --    PHUR 5.5  --    WBCUA 10-20*  --    RBCUA None seen  --    BACTERIA MANY*  --    CLARITYU CLOUDY*  --    SPECGRAV 1.022  --    LEUKOCYTESUR MODERATE*  --    UROBILINOGEN 2.0*  --    BILIRUBINUR MODERATE*  --    BLOODU Negative  --    GLUCOSE  --  127*        Pathology:     RADIOLOGY RESULTS:  Ct Head Wo Contrast    Result Date: 8/9/2020  EXAMINATION: CT of the brain without contrast HISTORY: 24 hours after fall. Intracranial hemorrhage. COMPARISON: CT brain from August 8, 2020ims a aspect of the brain indicate ligament sprain and/or Seng TECHNIQUE: Multiple contiguous axial images were obtained of the brain from the skull base through the vertex. Multiplanar reformats were obtained. FINDINGS: Prominence of the sulci and ventricles compatible with moderate generalized parenchymal volume loss. Gray-white matter differentiation is preserved. Areas of bilateral supratentorial white matter hypoattenuation are nonspecific but most likely related to  chronic small vessel ischemic changes in a patient of this age. No acute hemorrhage or abnormal extra-axial fluid collection. No mass effect or midline shift. The visualized paranasal sinuses and mastoid air cells are clear.  Calvarium is intact. No acute intracranial process or significant interval change. All CT scans at this facility use dose modulation, iterative reconstruction, and/or weight based dosing when appropriate to reduce radiation dose to as low as reasonably achievable. Ct Head Wo Contrast    Result Date: 8/8/2020  CT HEAD WO CONTRAST CLINICAL HISTORY: Multiple falls in past few days COMPARISON: January 8, 2019 TECHNIQUE: Multiple unenhanced serial axial images of the brain from the vertex of the skull to the base of the skull were performed. FINDINGS: The ventricles are dilated. Unchanged size configuration. No mass. No midline shift. The cisterns are patent. There are white matter and periventricular changes most likely consistent with chronic small vessel disease. No acute intra-axial or extra-axial findings. The visualized osseous structures are unremarkable. The visualized portion of the paranasal sinuses, and mastoids are unremarkable. Both globes are intact. No gross preseptal or post septal findings. NO ACUTE INTRA-AXIAL OR EXTRA-AXIAL FINDINGS. All CT scans at this facility use dose modulation, iterative reconstruction, and/or weight based dosing when appropriate to reduce radiation dose to as low as reasonably achievable. Ct Cervical Spine Wo Contrast    Result Date: 8/8/2020  CT CERVICAL SPINE WO CONTRAST CLINICAL HISTORY: Multiple falls COMPARISON: January 8, 2019 Findings: Multiple serial axial images of the cervical spine from the base of the skull through the upper thoracic vertebra with both sagittal and coronal reconstructions was performed. Exam is limited by motion artifact. There is straightening of the normal expected cervical lordosis. There is multilevel degenerative joint disease. Prevertebral  soft tissues are  unremarkable. The disk spaces are diffusely narrowed. There is a minimal retrolisthesis of C5 and C6. May be due to posterior facet arthropathy. No acute fractures.  There appears to Generalized pain. COMPARISON: None TECHNIQUE: Multiple unenhanced serial axial images of the of the maxillofacial region with both sagittal and coronal reconstructions was performed. FINDINGS: The visualized basal brain shows no acute intra-axial or extra-axial findings. There are white matter changes most likely consistent with chronic small vessel ischemic disease. Both globes are intact. No gross preseptal or post septal findings. The visualized paranasal sinuses, frontal, ethmoid, sphenoid, maxillary sinuses are well aerated. No mucoperiosteal thickening, retention cyst and/or polyps or air fluid levels are noted. Both globes are intact. No significant preseptal or post septal findings. There are no acute fractures or focal bony abnormalities. There is some soft tissue changes in the right premaxillary region. Correlate with patient history and exam There are scattered dental caries and periapical lucencies. NO ACUTE FRACTURES. DENTAL DISEASE IS NOTED. FOLLOW-UP All CT scans at this facility use dose modulation, iterative reconstruction, and/or weight based dosing when appropriate to reduce radiation dose to as low as reasonably achievable. .     ASSESSMENT AND PLAN  1. Marked lower extremity weakness and Hx of fall sustaining bruises all over. Etiology of which is not clear at this time  2. Solid and cystic Left adrenal mass. Malignancy has to be ruled out    3. Compression fracture of T5 vertebra could be secondary to osteoporosis and fall. Recommendations : Please consider CT guided biopsy of left adrenal mass. MRI of thoracic spine will be helpful as the patient has  Lower extremity weakness. Once the diagnosis is established, Then Family conference will be required to decide on future course of action . Patient's performance status is poor . Comfort care should be our goal.    Thanks for this consultation . Thanks for this consultation .     Electronically signed by Britney Griggs

## 2020-08-09 NOTE — FLOWSHEET NOTE
Admission complete. Patient denies cp, sob, n/v. Patient is oriented to self but is disoriented to place, time, and situation. LS diminished. BS active in all 4 quadrants. Patient has multiple abrasions to face. Patient has redness to right hand and was scratching this area. Patient's son called and I was able to verify home meds with him. Patient is resting in bed at this time. Bed alarm on. Call light within reach. Will continue to monitor.

## 2020-08-09 NOTE — CARE COORDINATION
ATTEMPTED TO SPEAK WITH PATIENT ABOUT DISCHARGE PLANNING, PATIENT STATED SHE LIVED AT HOME WITH HER PARENTS AND SHE DID NOT KNOW WHY SHE WAS AT [facility]. [facility] DEFERRED FOR NOW, WILL ATTEMPT TO REACH FAMILY FOR INFO.

## 2020-08-09 NOTE — PROGRESS NOTES
Physical Therapy Med Surg Initial Assessment  Facility/Department: [facility]  Room: N0/V223-68       NAME: Marian Conway  : 1935 (80 y.o.)  MRN: [id number]  CODE STATUS: Full Code    Date of Service: 2020    Patient Diagnosis(es): UTI (urinary tract infection) [N39.0]   Chief Complaint   Patient presents with    Fall     alterned mental status      Patient Active Problem List    Diagnosis Date Noted    UTI (urinary tract infection) 2020    Recurrent UTI 2019    Hypokalemia 2019    Decreased activities of daily living (ADL) 2019    Hypomagnesemia 2019    OA (osteoarthritis) 01/10/2019    Dementia ([facility] 2019    Chronic gout 2019    Blood thinned due to long-term anticoagulant use 2019    DJD (degenerative joint disease), cervical 2019    Chronic a-fib 09/15/2015    Obesity 2013    HTN (hypertension) 2013    Hypothyroidism 2013    Edema 2013    Type 2 diabetes mellitus with hyperglycemia, without long-term current use of insulin ([facility] 10/03/2012        Past Medical History:   Diagnosis Date    Anticoagulant long-term use     Arthritis     Atrial fibrillation (HCC)     DJD (degenerative joint disease), cervical 2019    Hypertension     Hypothyroidism     Obesity     Type II or unspecified type diabetes mellitus without mention of complication, not stated as uncontrolled      Past Surgical History:   Procedure Laterality Date    FRACTURE SURGERY         Chart Reviewed: Yes  Patient assessed for rehabilitation services?: Yes  Family / Caregiver Present: No    Restrictions:  Restrictions/Precautions: Fall Risk     SUBJECTIVE: Subjective: Pt states \"I not doing good. \" Pt denies pain but appears confused    Pain  Pre Treatment Pain Screening  Pain at present: 0  Scale Used: Numeric Score  Intervention List: Patient able to continue with treatment    Post Treatment Pain Screening:   Pain Screening  Patient Currently in Pain: Denies  Pain Assessment  Pain Assessment: 0-10  Pain Level: 0    Prior Level of Function:  Social/Functional History  Lives With: Son(primary caregiver)  Type of Home: House  Home Layout: One level  Home Access: Stairs to enter with rails  Entrance Stairs - Number of Steps: 4  Entrance Stairs - Rails: Right  Bathroom Shower/Tub: Walk-in shower  Bathroom Equipment: Grab bars in shower  Home Equipment: Rolling walker  ADL Assistance: Needs assistance  Homemaking Assistance: Needs assistance  Homemaking Responsibilities: No  Ambulation Assistance: Independent(2ww)  Transfer Assistance: Independent  Active : No  Mode of Transportation: Family  Additional Comments: Pt is a poor historian, oriented to self only. Info provided through chart review. Pt was admitted to [id number] [place], [place] [address]ab Jan 2020.     OBJECTIVE:   Vision: (appear to function in room environement; limited assessment d/t confusion)  Hearing: Exceptions to WFL(requires frequent repetion and rephrasing)  Hearing Exceptions: Hard of hearing/hearing concerns    Cognition:  Overall Orientation Status: Impaired  Orientation Level: Oriented to person, Disoriented to place, Disoriented to time, Disoriented to situation  Follows Commands: Impaired(with repetition, limited by confusion)    Observation/Palpation  Observation: confused, no acute distress noted, redirectable at times    ROM:  RLE PROM: WFL  LLE PROM: WFL    Strength:  Strength RLE  Comment: functionally >3+/5  Strength LLE  Comment: functionally >3+/5  Strength Other  Other: unable to perform MMT d/t confusion    Neuro:  Balance  Posture: Fair  Sitting - Static: Fair;+  Sitting - Dynamic: Fair  Standing - Static: Fair  Standing - Dynamic: Fair;-     Motor Control  Gross Motor?: WFL     Bed mobility  Supine to Sit: Moderate assistance  Sit to Supine: Unable to assess(pt positioned in bedside chair, SLP present upon departure)    Transfers  Sit to Stand: Minimal Assistance  Stand to sit: Minimal Assistance  Bed to Chair: Minimal assistance  Stand Pivot Transfers: Minimal Assistance  Comment: with use of 2ww, decreased safety awareness, requires assistance for walker management    Ambulation  Ambulation?: Yes  More Ambulation?: No  Ambulation 1  Surface: level tile  Device: Rolling Walker  Assistance: Minimal assistance  Quality of Gait: flexed posture  Gait Deviations: Slow Krista; Increased JOHANNA; Decreased step length;Decreased step height  Distance: 2ft  Comments: forward, turning, backward steps to reach bedside chair, VC for safe hand placement with poor follow through        Activity Tolerance  Activity Tolerance: Patient Tolerated treatment well      PT Education  PT Education: PT Role;Plan of Care    ASSESSMENT:   Body structures, Functions, Activity limitations: Decreased functional mobility ; Decreased strength;Decreased posture;Decreased cognition;Decreased balance  Decision Making: Medium Complexity  History: med  Exam: med  Clinical Presentation: med    Prognosis: Fair(confusion, memory deficits)  Barriers to Learning: none    DISCHARGE RECOMMENDATIONS:  Discharge Recommendations: Continue to assess pending progress, Patient would benefit from continued therapy after discharge    Assessment: Pt demonstrates the above deficits and decline in functional mobility status placing them at increased risk for falls. Pt would benefit from physical therapy to address above deficits and allow for safe return home at highest level of function, decrease risk for falls, and improve QOL.     REQUIRES PT FOLLOW UP: Yes      PLAN OF CARE:  Plan  Times per week: 3-6  Current Treatment Recommendations: Strengthening, IADL Training, Neuromuscular Re-education, Manual Therapy - Joint Manipulation, Home Exercise Program, Safety Education & Training, Cognitive Reorientation, Gait Training, Transfer Training, Balance Training, Stair training, Pain Management, Modalities, Equipment Evaluation, Education, & procurement, Positioning, Patient/Caregiver Education & Training  Safety Devices  Type of devices: All fall risk precautions in place    Goals:  Patient goals : unable to state  Long term goals  Long term goal 1: Bed mobility with indep  Long term goal 2: Functional transfers with indep  Long term goal 3: Amb >50ft with 2ww  Long term goal 4: SBA for HEP to improve LE strength and balance    AMPAC (6 CLICK) BASIC MOBILITY  AM-PAC Inpatient Mobility Raw Score : 15     Therapy Time:   Individual   Time In 0900   Time Out 0915   Minutes [address], [place], 08/09/20 at 11:29 AM         Definitions for assistance levels  Independent = pt does not require any physical supervision or assistance from another person for activity completion. Device may be needed.   Stand by assistance = pt requires verbal cues or instructions from another person, close to but not touching, to perform the activity  Minimal assistance= pt performs 75% or more of the activity; assistance is required to complete the activity  Moderate assistance= pt performs 50% of the activity; assistance is required to complete the activity  Maximal assistance = pt performs 25% of the activity; assistance is required to complete the activity  Dependent = pt requires total physical assistance to accomplish the task

## 2020-08-09 NOTE — CONSULTS
Subjective:      C.C: Diego De La Fuente is a 80 y.o. female who presents today for falls/confusion    HPI 80-year-old right-handed female presents to the emergency room after EMS were called as she has multiple falls in the past several days. EMS found the patient crawling on the floor and upon arrival there was no family member with her. Her son lives with her. She is only alert and oriented to self the baseline is not known though I truly feel that she has significant cognitive issues that are going on the way she looks and unkept and she is hard of hearing as well. Patient herself is not complain of any symptoms and is not even aware that she is in the hospital.    Review of Systems   Constitutional: Negative for fever. HENT: Negative for ear pain, tinnitus and trouble swallowing. Eyes: Negative for photophobia and visual disturbance. Respiratory: Negative for choking and shortness of breath. Cardiovascular: Negative for chest pain and palpitations. Gastrointestinal: Negative for nausea and vomiting. Musculoskeletal: Positive for gait problem and myalgias. Negative for back pain, joint swelling, neck pain and neck stiffness. Skin: Negative for color change. Allergic/Immunologic: Negative for food allergies. Neurological: Positive for weakness and light-headedness. Negative for dizziness, tremors, seizures, syncope, facial asymmetry, speech difficulty, numbness and headaches. Psychiatric/Behavioral: Positive for confusion. Negative for behavioral problems, hallucinations and sleep disturbance.        Past Medical History:   Diagnosis Date    Anticoagulant long-term use     Arthritis     Atrial fibrillation (HCC)     DJD (degenerative joint disease), cervical 1/9/2019    Hypertension     Hypothyroidism     Obesity     Type II or unspecified type diabetes mellitus without mention of complication, not stated as uncontrolled      Past Surgical History:   Procedure Laterality Date    FRACTURE SURGERY       Social History     Socioeconomic History    Marital status:       Spouse name: Not on file    Number of children: Not on file    Years of education: Not on file    Highest education level: Not on file   Occupational History    Not on file   Social Needs    Financial resource strain: Not on file    Food insecurity     Worry: Not on file     Inability: Not on file    Transportation needs     Medical: Not on file     Non-medical: Not on file   Tobacco Use    Smoking status: Former Smoker    Smokeless tobacco: Never Used   Substance and Sexual Activity    Alcohol use: No    Drug use: No    Sexual activity: Never   Lifestyle    Physical activity     Days per week: Not on file     Minutes per session: Not on file    Stress: Not on file   Relationships    Social connections     Talks on phone: Not on file     Gets together: Not on file     Attends Hindu service: Not on file     Active member of club or organization: Not on file     Attends meetings of clubs or organizations: Not on file     Relationship status: Not on file    Intimate partner violence     Fear of current or ex partner: Not on file     Emotionally abused: Not on file     Physically abused: Not on file     Forced sexual activity: Not on file   Other Topics Concern    Not on file   Social History Narrative         Lives With: Son    Type of Home: House- One level    Home Access: Stairs to enter with rails    Entrance Stairs - Number of Steps: 4 - Rails: Right    Bathroom Shower/Tub: Walk-in shower    Bathroom Equipment: Grab bars in shower    Home Equipment: Rolling walker    ADL Assistance: Needs assistance (son)    Homemaking Assistance: Needs assistance (son)    Ambulation Assistance: Independent (2ww)    Transfer Assistance: Independent    Active : No    Additional Comments: info provided by son who is primary caregiver               Family History   Problem Relation Age of Onset    Stroke Mother  Cancer Father     Heart Disease Brother     Heart Attack Brother      No Known Allergies  Current Facility-Administered Medications   Medication Dose Route Frequency Provider Last Rate Last Dose    0.9 % sodium chloride infusion   Intravenous Continuous Cheryle Median, MD 50 mL/hr at 08/08/20 2051      sodium chloride flush 0.9 % injection 10 mL  10 mL Intravenous 2 times per day Gabriel Crease, APRN - CNP        sodium chloride flush 0.9 % injection 10 mL  10 mL Intravenous PRN Gabriel Crease, APRN - CNP        acetaminophen (TYLENOL) tablet 650 mg  650 mg Oral Q6H PRN Gabriel Crease, APRN - CNP        Or    acetaminophen (TYLENOL) suppository 650 mg  650 mg Rectal Q6H PRN Gabriel Crease, APRN - CNP        polyethylene glycol (GLYCOLAX) packet 17 g  17 g Oral Daily PRN Gabriel Crease, APRN - CNP        promethazine (PHENERGAN) tablet 12.5 mg  12.5 mg Oral Q6H PRN Gabriel Crease, APRN - CNP        Or    ondansetron (ZOFRAN) injection 4 mg  4 mg Intravenous Q6H PRN Gabriel Crease, APRN - CNP        enoxaparin (LOVENOX) injection 40 mg  40 mg Subcutaneous Daily Gabriel Crease, APRN - CNP   40 mg at 08/09/20 0921    cefTRIAXone (ROCEPHIN) 1 g IVPB in 50 mL D5W minibag  1 g Intravenous Q24H Gabriel Crease, APRN - CNP   Stopped at 08/08/20 2121    allopurinol (ZYLOPRIM) tablet 100 mg  100 mg Oral Daily Jus Finney APRN - CNP   100 mg at 08/09/20 9408    levothyroxine (SYNTHROID) tablet 75 mcg  75 mcg Oral Daily Jus Finney APRN - CNP        losartan (COZAAR) tablet 25 mg  25 mg Oral Daily Estevan Cazares APRN - CNP   25 mg at 08/09/20 8317        Objective:   BP (!) 123/48   Pulse 61   Temp 98 °F (36.7 °C) (Oral)   Resp 18   Ht 5' 2\" (1.575 m)   Wt 192 lb (87.1 kg)   SpO2 100%   BMI 35.12 kg/m²     Physical Exam  Vitals signs reviewed. Eyes:      Pupils: Pupils are equal, round, and reactive to light. Neck:      Musculoskeletal: Normal range of motion. Cardiovascular:      Rate and Rhythm: Normal rate and regular rhythm. Heart sounds: No murmur. Pulmonary:      Effort: Pulmonary effort is normal.      Breath sounds: Normal breath sounds. Abdominal:      General: Bowel sounds are normal.   Musculoskeletal: Normal range of motion. Skin:     General: Skin is warm. Neurological:      Mental Status: She is alert. She is disoriented. Cranial Nerves: No cranial nerve deficit. Sensory: No sensory deficit. Motor: No abnormal muscle tone. Coordination: Coordination normal.      Deep Tendon Reflexes: Reflexes are normal and symmetric. Babinski sign absent on the right side. Babinski sign absent on the left side. Psychiatric:         Mood and Affect: Mood normal.     In addition to the above examination patient is hard of hearing and it is possible that she cannot understand some of the questions. She has a nonfocal examination with areflexia at the ankles without any myelopathic signs and has multiple bruises over her face. No parkinsonian features are notable at least from the exam.  There is no sensory levels. Ct Head Wo Contrast    Result Date: 8/8/2020  CT HEAD WO CONTRAST CLINICAL HISTORY: Multiple falls in past few days COMPARISON: January 8, 2019 TECHNIQUE: Multiple unenhanced serial axial images of the brain from the vertex of the skull to the base of the skull were performed. FINDINGS: The ventricles are dilated. Unchanged size configuration. No mass. No midline shift. The cisterns are patent. There are white matter and periventricular changes most likely consistent with chronic small vessel disease. No acute intra-axial or extra-axial findings. The visualized osseous structures are unremarkable. The visualized portion of the paranasal sinuses, and mastoids are unremarkable. Both globes are intact. No gross preseptal or post septal findings. NO ACUTE INTRA-AXIAL OR EXTRA-AXIAL FINDINGS.  All CT scans at this facility use dose modulation, iterative reconstruction, and/or weight based dosing when appropriate to reduce radiation dose to as low as reasonably achievable. Ct Cervical Spine Wo Contrast    Result Date: 8/8/2020  CT CERVICAL SPINE WO CONTRAST CLINICAL HISTORY: Multiple falls COMPARISON: January 8, 2019 Findings: Multiple serial axial images of the cervical spine from the base of the skull through the upper thoracic vertebra with both sagittal and coronal reconstructions was performed. Exam is limited by motion artifact. There is straightening of the normal expected cervical lordosis. There is multilevel degenerative joint disease. Prevertebral  soft tissues are  unremarkable. The disk spaces are diffusely narrowed. There is a minimal retrolisthesis of C5 and C6. May be due to posterior facet arthropathy. No acute fractures. There appears to be high density material within the puriform sinuses. This patient taking any over-the-counter medication or undergone recent radiological swallowing study. Correlate with patient history. .     NO ACUTE FRACTURES. OTHER FINDINGS DETAILED ABOVE All CT scans at this facility use dose modulation, iterative reconstruction, and/or weight based dosing when appropriate to reduce radiation dose to as low as reasonably achievable. Ct Thoracic Spine Wo Contrast    Result Date: 8/8/2020  EXAMINATION:  CT SCAN THORACIC SPINE CLINICAL HISTORY:  Multiple falls COMPARISON:  None TECHNIQUE:  Multiple serial axial images of the thoracic spine from the lower cervical through the upper lumbar vertebra with both sagittal coronal reconstruction was performed. FINDINGS:  There is a moderate dorsal kyphosis. There is a diffuse generalized osteopenia. There is multilevel degenerative changes. The disc spaces are intact. No significant spondylolisthesis. There is some loss of height of the T5 vertebra. May represent age-indeterminate compression fracture.  No other fracture or focal bony abnormalities. SOME LOSS OF HEIGHT OF THE T5 VERTEBRA. MAY REPRESENT AGE-INDETERMINATE COMPRESSION FRACTURE. NO OTHER FRACTURE All CT scans at this facility use dose modulation, iterative reconstruction, and/or weight based dosing when appropriate to reduce radiation dose to as low as reasonably achievable. Ct Lumbar Spine Wo Contrast    Result Date: 2020  EXAMINATION:  CT SCAN LUMBAR SPINE CLINICAL HISTORY:  Multiple falls COMPARISON:  None TECHNIQUE:  Multiple serial axial images from the T11 through the sacral levels with both sagittal coronal reconstruction was performed. FINDINGS:  There are 5 lumbar type vertebrae. There is diffuse generalized osteopenia. There is multilevel degenerative changes with bridging osteophytes. There is a minimal grade 1 anterolisthesis of L3-4. Disc spaces are diffusely narrowed. The SI joints are intact. No acute fractures. Within the field-of-view there is both a cystic area of the left adrenal as well is a solid component measuring 2.0 cm. The cystic component measures 1.8 cm. There is a partially exophytic cystic area at the interpole region of the right kidney. It measures 1.6 cm.     1. NO ACUTE FRACTURES. 2. SOLID CYSTIC LEFT ADRENAL MASS AS DESCRIBED ABOVE. DOES NOT QUALIFY AS A SIMPLE ADENOMA. THEREFORE FOLLOW-UP AND FURTHER EVALUATION WOULD BE WARRANTED. 2. AREA OF PARTIALLY EXOPHYTIC LOW-ATTENUATION IN THE RIGHT KIDNEY. LIKELY RENAL CYST BUT NOT FULLY CHARACTERIZED IN THIS NONCONTRAST STUDY. CORRELATE CLINICALLY AND FOLLOW-UP All CT scans at this facility use dose modulation, iterative reconstruction, and/or weight based dosing when appropriate to reduce radiation dose to as low as reasonably achievable.     Xr Chest Portable    Result Date: 2020  Patient MRN: [id number] : 1935 Age:  80 years Gender: Female Order Date: 2020 3:20 PM. Exam: XR CHEST PORTABLE Number of Views: 1 Indication:  Altered mental status/fall Comparison: 2019 Findings: Stable, enlarged cardiomediastinal silhouette with underlying central pulmonary vascular congestion, right hemidiaphragmatic elevation and thoracic aortic vascular calcifications. Airspace opacification right medial inferior lung base. Impression:  1. Stable, enlarged cardiomediastinal silhouette with underlying central pulmonary vascular congestion right hemidiaphragmatic elevation. 2. Vascular calcifications thoracic aorta. 3. Nonspecific airspace disease medial inferior right lung base could be reflective of infection/infiltrate/pneumonia and/or atelectasis/scarring. 4. Please note this study does not exclude the possibility of underlying CoVid-19 viral infection and/or underlying pulmonary involvement. Ct Maxillofacial Wo Contrast    Result Date: 8/8/2020  UNENHANCED CT SCAN OF THE MAXILLOFACIAL REGION. CLINICAL HISTORY: Multiple falls. Generalized pain. COMPARISON: None TECHNIQUE: Multiple unenhanced serial axial images of the of the maxillofacial region with both sagittal and coronal reconstructions was performed. FINDINGS: The visualized basal brain shows no acute intra-axial or extra-axial findings. There are white matter changes most likely consistent with chronic small vessel ischemic disease. Both globes are intact. No gross preseptal or post septal findings. The visualized paranasal sinuses, frontal, ethmoid, sphenoid, maxillary sinuses are well aerated. No mucoperiosteal thickening, retention cyst and/or polyps or air fluid levels are noted. Both globes are intact. No significant preseptal or post septal findings. There are no acute fractures or focal bony abnormalities. There is some soft tissue changes in the right premaxillary region. Correlate with patient history and exam There are scattered dental caries and periapical lucencies. NO ACUTE FRACTURES. DENTAL DISEASE IS NOTED.  FOLLOW-UP All CT scans at this facility use dose modulation, iterative reconstruction, and/or weight based dosing when appropriate to reduce radiation dose to as low as reasonably achievable. Lab Results   Component Value Date    WBC 7.0 08/09/2020    RBC 4.78 08/09/2020    RBC 4.16 09/30/2011    HGB 13.4 08/09/2020    HCT 40.5 08/09/2020    MCV 84.6 08/09/2020    MCH 28.1 08/09/2020    MCHC 33.2 08/09/2020    RDW 15.2 08/09/2020     08/09/2020    MPV 8.6 11/12/2014     Lab Results   Component Value Date     08/09/2020    K 3.4 08/09/2020    CL 99 08/09/2020    CO2 23 08/09/2020    BUN 27 08/09/2020    CREATININE 0.69 08/09/2020    GFRAA >60.0 08/09/2020    LABGLOM >60.0 08/09/2020    GLUCOSE 127 08/09/2020    GLUCOSE 153 09/30/2011    PROT 5.8 08/09/2020    LABALBU 2.9 08/09/2020    LABALBU 4.1 09/30/2011    CALCIUM 8.8 08/09/2020    BILITOT 2.8 08/09/2020    ALKPHOS 63 08/09/2020    AST 33 08/09/2020    ALT 14 08/09/2020     Lab Results   Component Value Date    PROTIME 14.8 08/08/2020    PROTIME 21.8 07/29/2016    INR 1.2 08/08/2020     Lab Results   Component Value Date    TSH 2.050 01/09/2019    UMARKWDN86 395 01/09/2019    FOLATE 8.7 01/09/2019    FERRITIN 59.4 08/16/2017    IRON 44 08/16/2017    TIBC 258 08/16/2017     Lab Results   Component Value Date    TRIG 97 02/15/2018    HDL 36 02/15/2018    LDLCALC 117 02/15/2018     No results found for: LABAMPH, BARBSCNU, LABBENZ, CANNAB, COCAINESCRN, LABMETH, OPIATESCREENURINE, PHENCYCLIDINESCREENURINE, PPXUR, ETOH  No results found for: LITHIUM, DILFRTOT, VALPROATE    Assessment:   Gait ataxia with multiple falls in a patient with possible atrial fibrillation she is supposed to be on Xarelto. Patient has increased confusion and is likely that his baseline dementia from the way she looks in the examination being very pleasantly confused. The vascular etiology of her dementia is likely. Patient has had multiple falls and reportedly of recent onset and therefore underlying cerebrovascular events such as strokes must be ruled out.   We will also assess her cervical spine to make sure she does not have canal stenosis or compression causing some of the symptoms. At bedside I did not appreciate that she has any extrapyramidal findings. Some of her issues appears to be hearing loss and may contribute to her not understanding some of the questions. I am not quite sure if she has hearing aids at home. Patient was alone at home when EMS arrived she does have a son who had left. Gait ataxia secondary to the above related to vasculopathy likely to be small vessel ischemic changes causing the cognitive decline as well. May relate to a multi-infarct state. In addition to this patient has a UTI which be contributing to her sudden decline as well. Horacio Melvin MD, Supriya Garay, American Board of Psychiatry & Neurology  Board Certified in Vascular Neurology  Board Certified in Neuromuscular Medicine  Certified in [facility]:

## 2020-08-10 PROBLEM — R26.0 ATAXIC GAIT: Status: ACTIVE | Noted: 2019-01-09

## 2020-08-10 LAB
LV EF: 65 %
LVEF MODALITY: NORMAL
T4 FREE: 0.71 NG/DL (ref 0.84–1.68)

## 2020-08-10 PROCEDURE — 6360000002 HC RX W HCPCS: Performed by: NURSE PRACTITIONER

## 2020-08-10 PROCEDURE — 6370000000 HC RX 637 (ALT 250 FOR IP): Performed by: NURSE PRACTITIONER

## 2020-08-10 PROCEDURE — 36415 COLL VENOUS BLD VENIPUNCTURE: CPT

## 2020-08-10 PROCEDURE — 99231 SBSQ HOSP IP/OBS SF/LOW 25: CPT | Performed by: NURSE PRACTITIONER

## 2020-08-10 PROCEDURE — 84439 ASSAY OF FREE THYROXINE: CPT

## 2020-08-10 PROCEDURE — 6370000000 HC RX 637 (ALT 250 FOR IP): Performed by: INTERNAL MEDICINE

## 2020-08-10 PROCEDURE — 93010 ELECTROCARDIOGRAM REPORT: CPT | Performed by: INTERNAL MEDICINE

## 2020-08-10 PROCEDURE — 99233 SBSQ HOSP IP/OBS HIGH 50: CPT | Performed by: PSYCHIATRY & NEUROLOGY

## 2020-08-10 PROCEDURE — 99231 SBSQ HOSP IP/OBS SF/LOW 25: CPT | Performed by: PHYSICAL MEDICINE & REHABILITATION

## 2020-08-10 PROCEDURE — 93306 TTE W/DOPPLER COMPLETE: CPT

## 2020-08-10 PROCEDURE — APPSS30 APP SPLIT SHARED TIME 16-30 MINUTES: Performed by: NURSE PRACTITIONER

## 2020-08-10 PROCEDURE — 99221 1ST HOSP IP/OBS SF/LOW 40: CPT | Performed by: NURSE PRACTITIONER

## 2020-08-10 PROCEDURE — 2580000003 HC RX 258: Performed by: INTERNAL MEDICINE

## 2020-08-10 PROCEDURE — 2580000003 HC RX 258: Performed by: NURSE PRACTITIONER

## 2020-08-10 PROCEDURE — 92610 EVALUATE SWALLOWING FUNCTION: CPT

## 2020-08-10 PROCEDURE — 1210000000 HC MED SURG R&B

## 2020-08-10 RX ADMIN — ALLOPURINOL 100 MG: 100 TABLET ORAL at 09:30

## 2020-08-10 RX ADMIN — LOSARTAN POTASSIUM 25 MG: 25 TABLET, FILM COATED ORAL at 09:29

## 2020-08-10 RX ADMIN — LEVOTHYROXINE SODIUM 75 MCG: 75 TABLET ORAL at 09:29

## 2020-08-10 RX ADMIN — CEFTRIAXONE SODIUM 1 G: 1 INJECTION, POWDER, FOR SOLUTION INTRAMUSCULAR; INTRAVENOUS at 20:23

## 2020-08-10 RX ADMIN — SODIUM CHLORIDE: 9 INJECTION, SOLUTION INTRAVENOUS at 14:33

## 2020-08-10 RX ADMIN — SODIUM CHLORIDE: 9 INJECTION, SOLUTION INTRAVENOUS at 01:27

## 2020-08-10 RX ADMIN — RIVAROXABAN 20 MG: 20 TABLET, FILM COATED ORAL at 17:27

## 2020-08-10 ASSESSMENT — ENCOUNTER SYMPTOMS
VOMITING: 0
TROUBLE SWALLOWING: 0
COUGH: 0
COLOR CHANGE: 0
WHEEZING: 0
NAUSEA: 0

## 2020-08-10 ASSESSMENT — PAIN SCALES - GENERAL
PAINLEVEL_OUTOF10: 0
PAINLEVEL_OUTOF10: 0

## 2020-08-10 NOTE — FLOWSHEET NOTE
Shift  Assessment complete. Patient denies cp, sob, n/v. Pt alert to self but disoriented to place, time ,and situation. LS clear, even and unlabored. BS active in all 4 quadrants. Patient keeps taking gown off and pulling on side rails and is not redirectable. Pt resting in bed at this time. Bed alarm on. Call light within reach. Will continue to monitor.

## 2020-08-10 NOTE — CONSULTS
Vascular Medicine and Interventional Radiology    Date of Consultation:8/10/2020    PATIENT: Sallie Fischer     : 1935     MR#: [id number]      Consultant:Willian Matthew, JENNIFER - CNP  Consulting Physician: Dr. Maria Guadalupe Bull, Vascular Medicine and Interventional Radiology     Consult Ordered By: Oncology Dr. Rahul Gaming            PCP:  Chinedu Lakhani PA-C     Attending Physician: Usha Metcalf MD     Date of Admission: 2020  1:32 PM     Chief Complaint:   Chief Complaint   Patient presents with    Fall     alterned mental status         Reason for Consultation: Percutaneous Biopsy of Left Adrenal Mass    History of Present Illness: Sallie Fischer is 80year old female admitted over weekend through ED for AMS and reported several falls with abrasions to face. All information obtained through chart review as patient is confused. She is not oriented to person, place, or time. She is unsure as to how she obtained her facial injuries as well as unaware she even has any injuries. She is unsure if she has any pain. Lying comfortably in bed without complaints. Interventional Radiology is being consulted for incidental finding of Left adrenal cystic and solid area, mostly cystic 2.0 cm noted on Lumbar CT. Past Medical History:   has a past medical history of Anticoagulant long-term use, Arthritis, Atrial fibrillation ([address] DJD (degenerative joint disease), cervical, Hypertension, Hypothyroidism, Obesity, and Type II or unspecified type diabetes mellitus without mention of complication, not stated as uncontrolled. Past SurgicalHistory:   has a past surgical history that includes fracture surgery. Allergies:Patient has no known allergies. Home Medications:   Prior to Admission medications    Medication Sig Start Date End Date Taking?  Authorizing Provider   levothyroxine (SYNTHROID) 75 MCG tablet TAKE 1 TABLET BY MOUTH DAILY 20  Yes Marilyn العلي PA-C   Cholecalciferol (VITAMIN D3) 1.25 MG (88300 UT) CAPS TAKE 1 CAPSULE BY MOUTH ONCE A WEEK 5/26/20  Yes Marilyn العلي PA-C   losartan (COZAAR) 25 MG tablet Take 1 tablet by mouth daily 5/22/20  Yes Marilyn العلي PA-C   [facility] UNIFINE PENTIPS 31G X 8 MM MISC USE AS DIRECTED ONCE DAILY 1/13/20  Yes Marilyn العلي PA-C   BASAGLAR KWIKPEN 100 UNIT/ML injection pen INJECT 15 UNITS SUBCUTANEOUSLY NIGHTLY 1/13/20  Yes Marilyn العلي PA-C   magnesium oxide (MAG-OX) 400 (241.3 Mg) MG TABS tablet TAKE 1 TABLET BY MOUTH TWICE DAILY 11/11/19  Yes Marilyn العلي PA-C   furosemide (LASIX) 40 MG tablet TAKE 1 & 1/2 (ONE AND ONE-HALF) TABLETS BY MOUTH EVERY MONDAY, WEDNESDAY & FRIDAY, and TAKE 1 TABLET EVERY Tuesday, [place], [place] & [place] 5/31/19  Yes JENNIFER Knapp NP   allopurinol (ZYLOPRIM) 100 MG tablet TAKE 1 TABLET BY MOUTH DAILY 3/15/19  Yes Marilyn العلي PA-C   rivaroxaban (XARELTO) 20 MG TABS tablet Take 1 tablet by mouth Daily with supper 2/15/19  Yes Marilyn العلي PA-C   Blood Glucose Monitoring Suppl (TRUE METRIX METER) w/Device KIT Use 4 times a day. 1/22/19  Yes Historical Provider, MD   Blood Glucose Monitoring Suppl KIT Patient to test 4 times a day. Dx: E11.65. Please dispense the brand covered by patient's insurance. 1/22/19  Yes Marilyn العلي PA-C   blood glucose monitor strips Patient to test 4 times daily. Dx: E11.65. Please dispense the brand that is covered by insurance. 1/22/19  Yes Marilyn العلي PA-C   Lancets MISC Patient to test 4 times a day. Dx: E11.65. Please dispense the brand covered by patient's insurance. 1/22/19  Yes Marilyn العلي PA-C   Skin Protectants, Misc. (EUCERIN) cream Apply topically as needed for Dry Skin Apply topically as needed. Historical Provider, MD        Family History:   Family History   Problem Relation Age of Onset    Stroke Mother     Cancer Father     Heart Disease Brother     Heart Attack Brother       SocialHistory:    Social History     Socioeconomic History    Marital status:   Spouse name: Not on file    Number of children: Not on file    Years of education: Not on file    Highest education level: Not on file   Occupational History    Not on file   Social Needs    Financial resource strain: Not on file    Food insecurity     Worry: Not on file     Inability: Not on file    Transportation needs     Medical: Not on file     Non-medical: Not on file   Tobacco Use    Smoking status: Former Smoker    Smokeless tobacco: Never Used   Substance and Sexual Activity    Alcohol use: No    Drug use: No    Sexual activity: Never   Lifestyle    Physical activity     Days per week: Not on file     Minutes per session: Not on file    Stress: Not on file   Relationships    Social connections     Talks on phone: Not on file     Gets together: Not on file     Attends Confucianist service: Not on file     Active member of club or organization: Not on file     Attends meetings of clubs or organizations: Not on file     Relationship status: Not on file    Intimate partner violence     Fear of current or ex partner: Not on file     Emotionally abused: Not on file     Physically abused: Not on file     Forced sexual activity: Not on file   Other Topics Concern    Not on file   Social History Narrative         Lives With: Son    Type of Home: House- [address] [place] [address],[address] in [address] [place] [address] Access: Stairs to enter with rails    Entrance Stairs - Number of Steps: 4 - Rails: Right    Bathroom Shower/Tub: Walk-in shower    Bathroom Equipment: Grab bars in shower    Home Equipment: Rolling walker    ADL Assistance: Needs assistance (son)    Homemaking Assistance: Needs assistance (son)    Ambulation Assistance: Independent (2ww)    Transfer Assistance: Independent    Active : No    Additional Comments: info provided by son who is primary caregiver                  Review of Systems:    Unable to obtain ROS due to patient AMS.      Objective:   Vitals: BP (!) 119/52   Pulse 59   Temp 97.4 °F (36.3 °C) (Oral)   Resp 18   Ht 5' 2\" (1.575 m)   Wt 192 lb (87.1 kg)   SpO2 100%   BMI 35.12 kg/m²      Physical Examination:      Physical Exam  Constitutional:       General: She is not in acute distress. Appearance: She is obese. HENT:      Head: Normocephalic. Abrasion (several facial abrasions. ) present. Nose: Nose normal. No congestion. Mouth/Throat:      Mouth: Mucous membranes are moist.      Pharynx: Oropharynx is clear. Neck:      Musculoskeletal: Normal range of motion. Cardiovascular:      Rate and Rhythm: Normal rate and regular rhythm. Pulses: Normal pulses. Heart sounds: Normal heart sounds. Pulmonary:      Effort: Pulmonary effort is normal.   Abdominal:      General: Bowel sounds are normal.      Palpations: Abdomen is soft. Tenderness: There is no abdominal tenderness. Musculoskeletal:      Right lower leg: No edema. Left lower leg: No edema. Skin:     General: Skin is warm and dry. Capillary Refill: Capillary refill takes 2 to 3 seconds. Coloration: Skin is not jaundiced. Findings: No erythema. Neurological:      Mental Status: She is alert. She is confused. Psychiatric:         Cognition and Memory: Cognition is impaired. Memory is impaired. She exhibits impaired recent memory and impaired remote memory. No radiation information found for this patient    Narrative    EXAMINATION:  CT SCAN LUMBAR SPINE         CLINICAL HISTORY:  Multiple falls         COMPARISON:  None         TECHNIQUE:  Multiple serial axial images from the T11 through the sacral levels with both sagittal coronal reconstruction was performed.         FINDINGS:      There are 5 lumbar type vertebrae. There is diffuse generalized osteopenia. There is multilevel degenerative changes with bridging osteophytes. There is a minimal grade 1 anterolisthesis of L3-4.     Disc spaces are diffusely narrowed.         The SI joints are intact.         No

## 2020-08-10 NOTE — PROGRESS NOTES
[facility]   [facility]Department[facility]  Speech Language Pathology  Clinical Bedside Swallow Evaluation    NAME:Jenise Ellsworth  : 1935 (80 y.o.)   MRN: [id number]  ROOM: [place] [id number]  ADMISSION DATE: 2020  PATIENT DIAGNOSIS(ES): UTI (urinary tract infection) [N39.0]  Chief Complaint   Patient presents with    Fall     alterned mental status      Patient Active Problem List    Diagnosis Date Noted    Acute encephalopathy     Multiple falls     Closed head injury     UTI (urinary tract infection) 2020    Recurrent UTI 2019    Hypokalemia 2019    Decreased activities of daily living (ADL) 2019    Hypomagnesemia 2019    OA (osteoarthritis) 01/10/2019    Gait abnormality 2019    Dementia ([facility] 2019    Chronic gout 2019    Blood thinned due to long-term anticoagulant use 2019    DJD (degenerative joint disease), cervical 2019    Chronic a-fib 09/15/2015    Obesity 2013    HTN (hypertension) 2013    Hypothyroidism 2013    Edema 2013    Type 2 diabetes mellitus with hyperglycemia, without long-term current use of insulin ([facility] 10/03/2012     Past Medical History:   Diagnosis Date    Anticoagulant long-term use     Arthritis     Atrial fibrillation (HCC)     DJD (degenerative joint disease), cervical 2019    Hypertension     Hypothyroidism     Obesity     Type II or unspecified type diabetes mellitus without mention of complication, not stated as uncontrolled      Past Surgical History:   Procedure Laterality Date    FRACTURE SURGERY       No Known Allergies    DATE ONSET: 2020    Date of Evaluation: 8/10/2020   Evaluating Therapist: Kasey Mata. Christi Amos, SLP    Recommended Diet and Intervention  Diet Solids Recommendation: Dysphagia Soft and Bite-Sized (Dysphagia III)  Liquid Consistency Recommendation:  Thin        Therapeutic Interventions: Diet tolerance monitoring, Therapeutic PO trials with SLP    Compensatory Swallowing Strategies  Compensatory Swallowing Strategies: Upright as possible for all oral intake;Small bites/sips;Assist feed    Reason for Referral  Tyra Perrin was referred for a bedside swallow evaluation to assess the efficiency of her swallow function, identify signs and symptoms of aspiration and make recommendations regarding safe dietary consistencies, effective compensatory strategies, and safe eating environment. General  Chart Reviewed: Yes  Behavior/Cognition: Alert;Agitated;Confused  Respiratory Status: Room air  Communication Observation: Functional(giving vague responses or stating she doesn't know)  Follows Directions: Simple(depending on pt participation)  Dentition: Adequate(unable to fully assess due to pt not following all directions)  Patient Positioning: Upright in bed  Baseline Vocal Quality: Normal  Prior Dysphagia History: BSE on previous admission 1/11/2019 in which recommended diet was regular diet with thin. Consistencies Administered: Reg solid; Thin - cup With max encouragement pt agreed to consume pineapple and water. Limited trials secondary to patient being uncooperative. Per nursing report, pt had no initiation during breakfast and was a total feed. Current Diet level:  Current Diet : Regular  Current Liquid Diet : Thin    Oral Motor Deficits  Oral/Motor  Oral Motor: (did not complete secondary to decreased participation)    Oral Phase Dysfunction  Oral Phase  Oral Phase: Exceptions  Oral Phase Dysfunction  Impaired Mastication: Reg Solid  Lingual/Palatal Residue: Reg solid  Oral Phase  Oral Phase - Comment: Mild oral dysphagia characterized by increased mastication time with pineapple. Mild lingual residue post swallow in which pt needed cues. Good oral control with consecutive sips of thin from cup.      Indicators of Pharyngeal Phase Dysfunction   Pharyngeal Phase  Pharyngeal Phase: WFL  Pharyngeal Phase   Pharyngeal: Pharyngeal stage of swallow appears WFL. No overt s/s of aspiration. Impression  Dysphagia Diagnosis: Mild oral stage dysphagia  Dysphagia Outcome Severity Scale: Level 5: Mild dysphagia- Distant supervision. May need one diet consistency restricted     Treatment Plan  Requires SLP Intervention: Yes  Duration/Frequency of Treatment: 1-2x/ week during LOS or until goals met          Treatment/Goals  Short-term Goals  Timeframe for Short-term Goals: 1 week  Goal 1: Pt will tolerate soft and bite size with thin liquids with no overt s/s of aspiration. Goal 2: Pt will tolerate po trials of regular with no overt s/s of difficulty or aspiration. Long-term Goals  Timeframe for Long-term Goals: 1 week  Goal 1: Pt will tolerate least restrictive diet with no overt s/s of aspiration. Prognosis  Prognosis  Prognosis for safe diet advancement: fair  Barriers to reach goals: cognitive deficits  Individuals consulted  Consulted and agree with results and recommendations: Patient;RN(Laine RIVAS)    Education  Patient Education: No education provided. Patient Education Response: No evidence of learning  Safety Devices in place: Yes  Type of devices: Bed alarm in place;Call light within reach([employer])    Pain Assessment:  Initial Assessment:  Patient demonstrated no s/s of pain. Re-assessment:  Patient demonstrated no s/s of pain. Therapy Time  SLP Individual Minutes  Time In: 1000  Time Out: 1010  Minutes: 10              Signature: Electronically signed by DION Miranda on 8/10/2020 at 10:24 AM

## 2020-08-10 NOTE — PROGRESS NOTES
[facility] Neurology Daily Progress Note  Name: Davide Soliz  Age: 80 y.o. Gender: female  CodeStatus: Full Code  Allergies: No Known Allergies    Chief Complaint:Fall (alterned mental status )    Primary Care Provider: David Morataya PA-C  InpatientTreatment Team: Treatment Team: Attending Provider: Marley Olson MD; Utilization Reviewer: Ruby Segovia RN; Consulting Physician: Gordon Tanner MD; Consulting Physician: Christine Chavez MD; Consulting Physician: Marvin Feliz MD; Patient Care Tech: [facility] Patient Care Tech: [employer]; Registered Nurse: Vernon Blake, RN; Registered Nurse: Chase Jacobs RN; Utilization Reviewer: Shantelle Jones RN  Admission Date: 8/8/2020      HPI   Pt seen and examined for neuro follow up for gait ataxia with recurrent falls and altered mental status. Patient currently alert and oriented x1, no acute distress. Patient having difficulty following commands. No behavioral issues noted. No focal deficits appreciated. No seizure activity reported. Unable to assess gait as patient has not ambulated today. Taking p.o. well. No dysphasia . Afebrile  Still attempting to determine patient's baseline cognitive status. Home environment questionable. Vitals:    08/10/20 0900   BP: (!) 119/52   Pulse: 59   Resp: 18   Temp: 97.4 °F (36.3 °C)   SpO2:       Review of Systems   Unable to perform ROS: Mental status change   Constitutional: Negative for appetite change and fever. HENT: Negative for hearing loss and trouble swallowing. Respiratory: Negative for cough and wheezing. Cardiovascular: Negative for leg swelling. Gastrointestinal: Negative for nausea and vomiting. Skin: Negative for color change and rash. Neurological: Positive for weakness (generalized). Negative for tremors, seizures, syncope, facial asymmetry and speech difficulty. Psychiatric/Behavioral: Positive for confusion. Negative for agitation, behavioral problems and hallucinations.  The patient is not nervous/anxious. Physical Exam  Vitals signs and nursing note reviewed. Constitutional:       General: She is not in acute distress. Appearance: She is not ill-appearing or diaphoretic. HENT:      Head: Normocephalic and atraumatic. Eyes:      Extraocular Movements: Extraocular movements intact. Pupils: Pupils are equal, round, and reactive to light. Cardiovascular:      Rate and Rhythm: Normal rate and regular rhythm. Pulmonary:      Effort: Pulmonary effort is normal. No respiratory distress. Breath sounds: Normal breath sounds. Abdominal:      General: Bowel sounds are normal. There is no distension. Palpations: Abdomen is soft. Skin:     General: Skin is warm and dry. Neurological:      General: No focal deficit present. Mental Status: She is alert. She is disoriented. Cranial Nerves: No cranial nerve deficit, dysarthria or facial asymmetry. Motor: Weakness (generalized) present. No tremor or seizure activity.       Comments: Neuro exam limited as pt confused and unable to follow commands               Medications:  Reviewed    Infusion Medications:    sodium chloride 50 mL/hr at 08/10/20 0127     Scheduled Medications:    rivaroxaban  20 mg Oral Dinner    sodium chloride flush  10 mL Intravenous 2 times per day    cefTRIAXone (ROCEPHIN) IV  1 g Intravenous Q24H    allopurinol  100 mg Oral Daily    levothyroxine  75 mcg Oral Daily    losartan  25 mg Oral Daily     PRN Meds: sodium chloride flush, acetaminophen **OR** acetaminophen, polyethylene glycol, promethazine **OR** ondansetron    Labs:   Recent Labs     08/08/20  1415 08/09/20  0955   WBC 9.5 7.0   HGB 13.2 13.4   HCT 40.1 40.5    162     Recent Labs     08/08/20  1415 08/09/20  0955    135   K 3.5 3.4   CL 98 99   CO2 24 23   BUN 33* 27*   CREATININE 0.74 0.69   CALCIUM 9.2 8.8     Recent Labs     08/08/20  1415 08/09/20  0955   AST 37* 33   ALT 15 14   BILITOT 3.4* 2.8*   ALKPHOS 68 63     Recent Labs     08/08/20  1415   INR 1.2     Recent Labs     08/08/20  1415 08/09/20  1815   CKTOTAL 286* 136   TROPONINI <0.010  --        Urinalysis:   Lab Results   Component Value Date    NITRU POSITIVE 08/08/2020    WBCUA 10-20 08/08/2020    BACTERIA MANY 08/08/2020    RBCUA None seen 08/08/2020    BLOODU Negative 08/08/2020    SPECGRAV 1.022 08/08/2020    GLUCOSEU Negative 08/08/2020       Radiology:   Most recent    EEG No procedure found. MRI of Brain No results found for this or any previous visit. No results found for this or any previous visit. MRA of the Head and Neck: No results found for this or any previous visit. No results found for this or any previous visit. No results found for this or any previous visit. CT of the Head:   Results for orders placed during the hospital encounter of 08/08/20   CT HEAD WO CONTRAST    Narrative EXAMINATION: CT of the brain without contrast    HISTORY: 24 hours after fall. Intracranial hemorrhage. COMPARISON: CT brain from August 8, 2020ims a aspect of the brain indicate ligament sprain and/or Seng    TECHNIQUE: Multiple contiguous axial images were obtained of the brain from the skull base through the vertex. Multiplanar reformats were obtained. FINDINGS:    Prominence of the sulci and ventricles compatible with moderate generalized parenchymal volume loss. Gray-white matter differentiation is preserved. Areas of bilateral supratentorial white matter hypoattenuation are nonspecific but most likely related to   chronic small vessel ischemic changes in a patient of this age. No acute hemorrhage or abnormal extra-axial fluid collection. No mass effect or midline shift. The visualized paranasal sinuses and mastoid air cells are clear. Calvarium is intact. Impression No acute intracranial process or significant interval change.     All CT scans at this facility use dose modulation, iterative reconstruction, and/or weight based dosing when appropriate to reduce radiation dose to as low as reasonably achievable. No results found for this or any previous visit. No results found for this or any previous visit. Carotid duplex: No results found for this or any previous visit. No results found for this or any previous visit. No results found for this or any previous visit. Echo No results found for this or any previous visit. Assessment/Plan:  Gait ataxia with multiple falls in a patient with possible atrial fibrillation she is supposed to be on Xarelto. Patient has increased confusion and is likely that his baseline dementia from the way she looks in the examination being very pleasantly confused. The vascular etiology of her dementia is likely. Patient has had multiple falls and reportedly of recent onset and therefore underlying cerebrovascular events such as strokes must be ruled out. We will also assess her cervical spine to make sure she does not have canal stenosis or compression causing some of the symptoms. At bedside I did not appreciate that she has any extrapyramidal findings. Some of her issues appears to be hearing loss and may contribute to her not understanding some of the questions. I am not quite sure if she has hearing aids at home. Patient was alone at home when EMS arrived she does have a son who had left.     Gait ataxia secondary to the above related to vasculopathy likely to be small vessel ischemic changes causing the cognitive decline as well. May relate to a multi-infarct state.     In addition to this patient has a UTI which be contributing to her sudden decline as well.     8/10/20:   Acute encephalopathy with underlying dementia  Gait ataxia with recurrent falls  HX PAF on xarelto  UTI  Left adrenal mass, oncology consulted  Mri Brain pending  MRI cervical spine pending  MRI t spine pending  Palliative care consulted for goals of care conversation  Still attempting to figure out pt baseline orientation. Further cognitive work-up to be done on outpatient basis once patient's underlying medical issues are resolved. Recommend SNF placement at this time  PT/OT    I independently performed an evaluation on this patient. I have reviewed the above documentation completed by the Nurse Practitioner. Please see my additional contributions to the HPI, physical exam, assessment/medical decision making. Horacio Leal MD, [address], American Board of Psychiatry & Neurology  Board Certified in Vascular Neurology  Board Certified in Neuromuscular Medicine  Certified in Neurorehabilitation         Collaborating physicians: Dr Zoie Leal    Electronically signed by JENNIFER Perez CNP on 8/10/2020 at 9:52 AM

## 2020-08-10 NOTE — PROGRESS NOTES
Subjective: The patient complains of severe  acute chronic progressive mental status change and generalized fatigue partially relieved by rehydration, rest, PT, OT and exacerbated by recent medical illness. I am concerned about patients severe dementia and ability to participate in rehab. ROS x10: The patient also complains of severely impaired mobility and activities of daily living. Otherwise no new problems with vision, hearing, nose, mouth, throat, dermal, cardiovascular, GI, , pulmonary, musculoskeletal, psychiatric or neurological. See Rehab consult on Rehab chart . Vital signs:  /84   Pulse 67   Temp 97.3 °F (36.3 °C) (Oral)   Resp 18   Ht 5' 2\" (1.575 m)   Wt 192 lb (87.1 kg)   SpO2 100%   BMI 35.12 kg/m²   I/O:   PO/Intake: 4 2 fair PO intake, no problems observed or reported. Bowel/Bladder:  continent, no problems noted. General:  Patient is well developed, adequately nourished, non-obese and     well kempt. HEENT:    PERRLA, hearing intact to loud voice, external inspection of ear     and nose benign. Inspection of lips, tongue and gums benign  Musculoskeletal: No significant change in strength or tone. All joints stable. Inspection and palpation of digits and nails show no clubbing,       cyanosis or inflammatory conditions. Neuro/Psychiatric: Affect: flat but pleasant. Alert and oriented to person, place and     situation excuse. No significant change in deep tendon reflexes or     sensation  Lungs:  Diminished, CTA-B. Respiration effort is normal at rest.     Heart:   S1 = S2,  RRR. No loud murmurs. Abdomen:  Soft, non-tender, no enlargement of liver or spleen. Extremities:  No significant lower extremity edema or tenderness. Skin:    BUE bruises dt blood draws, no visualized or palpated problems.     Rehabilitation:  Physical therapy: FIMS:  Bed Mobility:      Transfers: Sit to Stand: Minimal Assistance  Stand to sit: Minimal Assistance  Bed to Chair:  Minimal assistance, Ambulation 1  Surface: level tile  Device: Rolling Walker  Assistance: Minimal assistance  Quality of Gait: flexed posture  Gait Deviations: Slow Krista, Increased JOHANNA, Decreased step length, Decreased step height  Distance: 2ft  Comments: forward, turning, backward steps to reach bedside chair, VC for safe hand placement with poor follow through,      FIMS:  ,  ,      Occupational therapy: FIMS:   ,  ,      Speech therapy: FIMS:        Lab/X-ray studies reviewed, analyzed and discussed with patient and staff:   Recent Results (from the past 24 hour(s))   Comprehensive Metabolic Panel w/ Reflex to MG    Collection Time: 08/09/20  9:55 AM   Result Value Ref Range    Sodium 135 135 - 144 mEq/L    Potassium reflex Magnesium 3.4 3.4 - 4.9 mEq/L    Chloride 99 95 - 107 mEq/L    CO2 23 20 - 31 mEq/L    Anion Gap 13 9 - 15 mEq/L    Glucose 127 (H) 70 - 99 mg/dL    BUN 27 (H) 8 - 23 mg/dL    CREATININE 0.69 0.50 - 0.90 mg/dL    GFR Non-African American >60.0 >60    GFR  >60.0 >60    Calcium 8.8 8.5 - 9.9 mg/dL    Total Protein 5.8 (L) 6.3 - 8.0 g/dL    Alb 2.9 (L) 3.5 - 4.6 g/dL    Total Bilirubin 2.8 (H) 0.2 - 0.7 mg/dL    Alkaline Phosphatase 63 40 - 130 U/L    ALT 14 0 - 33 U/L    AST 33 0 - 35 U/L    Globulin 2.9 2.3 - 3.5 g/dL   CBC auto differential    Collection Time: 08/09/20  9:55 AM   Result Value Ref Range    WBC 7.0 4.8 - 10.8 K/uL    RBC 4.78 4.20 - 5.40 M/uL    Hemoglobin 13.4 12.0 - 16.0 g/dL    Hematocrit 40.5 37.0 - 47.0 %    MCV 84.6 82.0 - 100.0 fL    MCH 28.1 27.0 - 31.3 pg    MCHC 33.2 33.0 - 37.0 %    RDW 15.2 (H) 11.5 - 14.5 %    Platelets 910 119 - 057 K/uL    Neutrophils % 72.5 %    Lymphocytes % 17.9 %    Monocytes % 8.5 %    Eosinophils % 0.7 %    Basophils % 0.4 %    Neutrophils Absolute 5.0 1.4 - 6.5 K/uL    Lymphocytes Absolute 1.2 1.0 - 4.8 K/uL    Monocytes Absolute 0.6 0.2 - 0.8 K/uL    Eosinophils Absolute 0.0 0.0 - 0.7 K/uL    Basophils Absolute 0.0 0.0 - 0.2 K/uL   Lactic acid, plasma    Collection Time: 08/09/20  9:55 AM   Result Value Ref Range    Lactic Acid 1.8 0.5 - 2.2 mmol/L   Magnesium    Collection Time: 08/09/20  9:55 AM   Result Value Ref Range    Magnesium 1.7 1.7 - 2.4 mg/dL   CK    Collection Time: 08/09/20  6:15 PM   Result Value Ref Range    Total  0 - 170 U/L   Vitamin B12 & Folate    Collection Time: 08/09/20  6:15 PM   Result Value Ref Range    Vitamin B-12 288 232 - 1245 pg/mL    Folate 6.9 (L) 7.3 - 26.1 ng/mL   TSH without Reflex    Collection Time: 08/09/20  6:15 PM   Result Value Ref Range    TSH 6.780 (H) 0.440 - 3.860 uIU/mL   Sedimentation Rate    Collection Time: 08/09/20  6:15 PM   Result Value Ref Range    Sed Rate 9 0 - 30 mm       Previous extensive, complex labs, notes and diagnostics reviewed and analyzed. ALLERGIES:    Allergies as of 08/08/2020    (No Known Allergies)      (please also verify by checking [facility])     Complex Physical Medicine & Rehab Issues Assess & Plan:   1. Severe abnormality of gait and mobility and impaired self-care and ADL's secondary to progressive dementia mental status change status post UTI. Functional and medical status reassessed regarding patients ability to participate in therapies and patient found to be able to participate in skilled versus long-term care  intensive  rehabilitation program including PT/OT to improve balance, ambulation, ADLs, and to improve the P/AROM. It is my opinion that they will be able to tolerate 3 hours of therapy a day and benefit from it at an acute level. 2. Bowel and Bladder dysfunction:  frequent toileting, ambulate to bathroom with assistance, check post void residuals.   Check for C.difficile x1 if >2 loose stools in 24 hours, continue bowel & bladder program.   3. Moderate low back pain and generalized OA pain: reassess pain every shift and prior to and after each therapy session, give prn  Tylenol, modalities prn in therapy, consider Lidoderm, K-pad prn.   4. Skin breakdown risk:  continue pressure relief program.  Daily skin exams and reports from nursing. 5. Severe fatigue due to immobility and nutritional deficits: Add vitamin B12 vitamin D and CoQ10 titrate dosing and add protein supplementation with low carb content. 6. Complex discharge planning:   Because of her dementia should better be served at a lower level of care such as that provided by skilled nursing facility. Complex Active General Medical Issues that complicate care Assess & Plan:     1. Active Problems:    Type 2 diabetes mellitus with hyperglycemia, without long-term current use of insulin (HCC)    HTN (hypertension)    Hypothyroidism    Chronic a-fib    Gait abnormality    Decreased activities of daily living (ADL)    UTI (urinary tract infection)  Resolved Problems:    * No resolved hospital problems.  Shayy Ching D.O., PM&R     Attending    [facility]

## 2020-08-10 NOTE — PROGRESS NOTES
Marina Almanzar is a 80 y.o. female patient. Pt has dementia, no overnight events.   ROS: could not be obtained bc of medical condition  Current Facility-Administered Medications   Medication Dose Route Frequency Provider Last Rate Last Dose    rivaroxaban (XARELTO) tablet 20 mg  20 mg Oral Dinner Jose Griggs MD   20 mg at 08/09/20 1808    0.9 % sodium chloride infusion   Intravenous Continuous Jose Griggs MD 50 mL/hr at 08/10/20 0127      sodium chloride flush 0.9 % injection 10 mL  10 mL Intravenous 2 times per day Ranelle Perone, APRN - CNP        sodium chloride flush 0.9 % injection 10 mL  10 mL Intravenous PRN Ranelle Perone, APRN - CNP        acetaminophen (TYLENOL) tablet 650 mg  650 mg Oral Q6H PRN Ranelle Perone, APRN - CNP        Or    acetaminophen (TYLENOL) suppository 650 mg  650 mg Rectal Q6H PRN Ranelle Perone, APRN - CNP        polyethylene glycol (GLYCOLAX) packet 17 g  17 g Oral Daily PRN Ranelle Perone, APRN - CNP        promethazine (PHENERGAN) tablet 12.5 mg  12.5 mg Oral Q6H PRN Ranelle Perone, APRN - CNP        Or    ondansetron (ZOFRAN) injection 4 mg  4 mg Intravenous Q6H PRN Ranelle Perone, APRN - CNP        cefTRIAXone (ROCEPHIN) 1 g IVPB in 50 mL D5W minibag  1 g Intravenous Q24H Ranelle Perone, APRN - CNP   Stopped at 08/09/20 2059    allopurinol (ZYLOPRIM) tablet 100 mg  100 mg Oral Daily Elaine Mohair, APRN - CNP   100 mg at 08/10/20 0930    levothyroxine (SYNTHROID) tablet 75 mcg  75 mcg Oral Daily Elaine Mohair, APRN - CNP   75 mcg at 08/10/20 2700    losartan (COZAAR) tablet 25 mg  25 mg Oral Daily Danielle Cazares, APRN - CNP   25 mg at 08/10/20 1760     No Known Allergies  Active Problems:    Type 2 diabetes mellitus with hyperglycemia, without long-term current use of insulin (HCC)    HTN (hypertension)    Hypothyroidism    Chronic a-fib    Gait abnormality    Decreased activities of daily living (ADL)    UTI (urinary tract infection)    Acute

## 2020-08-10 NOTE — FLOWSHEET NOTE
Dr Rocco Barbour sent a message back. about mri and wrote leave it alone. Electronically signed by Isaiah Avila RN on 8/10/2020 at 2:16 PM

## 2020-08-10 NOTE — DISCHARGE INSTR - COC
Continuity of Care Form    Patient Name: Vinay Minaya   :  1935  MRN:  [id number]    Admit date:  2020  Discharge date:  20    Code Status Order: Full Code   Advance Directives:   [facility] Documentation     Date/Time Healthcare Directive Type of Healthcare Directive Copy in [facility] [address] Agent's Name Healthcare Agent's Phone Number    20 2111  No, patient does not have an advance directive for healthcare treatment -- -- -- -- --    20  Unknown, patient unable to respond due to medical condition -- -- -- -- --          Admitting Physician:  Annika Gonzalez MD  PCP: Rachael Aguila PA-C    Discharging Nurse: RYLAN RN  [address] Unit/Room#: G417/N642-20  Discharging Unit Phone Number: [phone]    Emergency Contact:   Extended Emergency Contact Information  Primary Emergency Contact: Pablo Ellsworth  Address: [address] [place] [address] Phone: [phone]  Mobile Phone: [phone]  Relation: Child  Secondary Emergency Contact: Kayleigh Glade [address] Phone: [phone]  Relation: Child    Past Surgical History:  Past Surgical History:   Procedure Laterality Date    FRACTURE SURGERY         Immunization History:   Immunization History   Administered Date(s) Administered    Influenza, High Dose (Fluzone 65 yrs and older) 2017, 2017, 10/09/2018    Pneumococcal Conjugate 13-valent (Gonzalo Moise) 2016    Pneumococcal Polysaccharide (Bkxodtozl47) 2017    Tdap (Boostrix, Adacel) 2018       Active Problems:  Patient Active Problem List   Diagnosis Code    Type 2 diabetes mellitus with hyperglycemia, without long-term current use of insulin ([facility] [address] E11.65    Obesity E66.9    HTN (hypertension) I10    Hypothyroidism E03.9    Edema R60.9    Chronic a-fib I48.20    Gait abnormality R26.9    Dementia ([facility] [address] F03.90    Chronic gout M1A. 9XX0    Blood thinned due to long-term anticoagulant use Z79.01    DJD (degenerative joint disease), cervical M47.812    OA (osteoarthritis) M19.90    Decreased activities of daily living (ADL) Z78.9    Hypomagnesemia E83.42    Recurrent UTI N39.0    Hypokalemia E87.6    UTI (urinary tract infection) N39.0    Acute encephalopathy G93.40    Multiple falls R29.6    Closed head injury S09.90XA       Isolation/Infection:   Isolation          No Isolation        Patient Infection Status     None to display          Nurse Assessment:  Last Vital Signs: BP (!) 119/52   Pulse 59   Temp 97.4 °F (36.3 °C) (Oral)   Resp 18   Ht 5' 2\" (1.575 m)   Wt 192 lb (87.1 kg)   SpO2 100%   BMI 35.12 kg/m²     Last documented pain score (0-10 scale): Pain Level: 0  Last Weight:   Wt Readings from Last 1 Encounters:   08/08/20 192 lb (87.1 kg)     Mental Status:  alert and pleasantly confused    IV Access:  - None    Nursing Mobility/ADLs:  Walking   Dependent  Transfer  Dependent  Bathing  Dependent  Dressing  Dependent  Toileting  Dependent  Feeding  Dependent  Med Admin  Dependent  Med Delivery   whole    Wound Care Documentation and Therapy:        Elimination:  Continence:   · Bowel: No  · Bladder: No  Urinary Catheter: None   Colostomy/Ileostomy/Ileal Conduit: No       Date of Last BM: 8/11/20    Intake/Output Summary (Last 24 hours) at 8/10/2020 1447  Last data filed at 8/10/2020 0512  Gross per 24 hour   Intake 600 ml   Output 450 ml   Net 150 ml     I/O last 3 completed shifts: In: 600 [I.V.:550; IV Piggyback:50]  Out: 450 [Urine:450]    Safety Concerns: At Risk for Falls    Impairments/Disabilities:      Impaired mobility    Nutrition Therapy:  Current Nutrition Therapy:   - Oral Diet:  General    Routes of Feeding: Oral  Liquids:  Thin Liquids  Daily Fluid Restriction: no  Last Modified Barium Swallow with Video (Video Swallowing Test): not done    Treatments at the Time of Hospital Discharge:   Respiratory Treatments: ***  Oxygen Therapy:  {Therapy; copd oxygen:52941}  Ventilator:    {MH CC Vent PQCB:688897562}    Rehab Therapies: {THERAPEUTIC INTERVENTION:0309160645}  Weight Bearing Status/Restrictions: [address] CC Weight Bearin}  Other Medical Equipment (for information only, NOT a DME order):  {EQUIPMENT:977817914}  Other Treatments: ***    Patient's personal belongings (please select all that are sent with patient):  {CHP DME Belongings:252538916}    RN SIGNATURE:  Electronically signed by Nahun Waggoner RN on 20 at [facility] PM EDT    CASE MANAGEMENT/SOCIAL WORK SECTION    Inpatient Status Date: 2020    Readmission Risk Assessment Score:  Readmission Risk              Risk of Unplanned Readmission:        13           Discharging to Facility/ Agency   · Name: [facility]  · Address:  · Phone:[phone]  · Fax:    Dialysis Facility (if applicable)   · Name:  · Address:  · Dialysis Schedule:  · Phone:  · Fax:    / signature: Electronically signed by KEVIN Nolasco on 8/10/20 at 2:47 PM EDT    PHYSICIAN SECTION    Prognosis: fair    Condition at Discharge: fair    Recommended Labs or Other Treatments After Discharge: FU Hgba1c  FU IR as outpt for bx of adrenal mass    PHYSICIAN SIGNATURE:  Electronically signed by Cheryle Median, MD on 20 at 12:40 PM EDT

## 2020-08-10 NOTE — PROGRESS NOTES
Physical Therapy Missed Treatment   Facility/Department: [facility] MED SURG X568/U969-92    NAME: Alisa Barnett    : 1935 (80 y.o.)  MRN: [id number]    Account: [de-identified]  Gender: female    Chart reviewed, attempted PT at 9:45. Patient unavailable 2° to:    [x] Hold - Pt has MRI for T5 ordered for possible fracture. Awaiting results and activity orders. [] Pt declined     [] Nsg notified   [] Other notified    [] Pt. . off floor for test/procedure. [] Pt. Unavailable       Will attempt PT treatment again at earliest convenience.       Electronically signed by Lamin Damian PTA on 8/10/20 at 9:48 AM EDT

## 2020-08-10 NOTE — PLAN OF CARE
Problem: Falls - Risk of:  Goal: Will remain free from falls  Description: Will remain free from falls  Outcome: Ongoing  Goal: Absence of physical injury  Description: Absence of physical injury  Outcome: Ongoing     Problem: Skin Integrity:  Goal: Will show no infection signs and symptoms  Description: Will show no infection signs and symptoms  Outcome: Ongoing  Goal: Absence of new skin breakdown  Description: Absence of new skin breakdown  Outcome: Ongoing     Problem: IP MOBILITY  Goal: LTG - patient will demonstrate safe mobility requirements  Outcome: Ongoing     Problem: IP SWALLOWING  Goal: LTG - patient will tolerate the least restrictive diet consistency to allow for safe consumption of daily meals  8/10/2020 1103 by Chanda Galicia RN  Outcome: Ongoing  8/10/2020 1025 by Sandra Saleem SLP  Outcome: Ongoing

## 2020-08-10 NOTE — CARE COORDINATION
LSW attempted to call pt's son but no answer. Message left for him to return call. LSW called pt's daughter but she explained that pt's son is the POA and makes all decisions. Pt lives at home with the son and he is her caregiver. LSW to follow with POA for DC decisions. POA returned the call and he is agreeable to short term skilled care for the pt at DC and he would like her to go to [facility]. LSW called the referral to Izabella Herrera at [facility].

## 2020-08-10 NOTE — PROGRESS NOTES
[facility] Occupational Therapy      Date: 8/10/2020  Patient Name: Abagail Ormond        MRN: [id number]  Account: [de-identified]   : 1935  (80 y.o.)  Room: [place] [address]    Chart reviewed, attempted OT at 1140 for eval. Patient unavailable 2° to:    [x] Hold per nsg request - XRAY to rule out T5 fx    [] Pt declined     [] Pt. . off floor for test/procedure. Will attempt again when able.     Electronically signed by Aracelis Barajas OT on 8/10/2020 at 11:55 AM

## 2020-08-10 NOTE — CONSULTS
Palliative Care Consult Note  Patient: Melly Cobb  Gender: female  YOB: 1935  Unit/Bed: D103/A644-54  CodeStatus: Full Code  Inpatient Treatment Team: Treatment Team: Attending Provider: Satnam Moore MD; Utilization Reviewer: Rafael Keane, EVELYN; Consulting Physician: Megan Sands MD; Consulting Physician: Ramiro Salinas MD; Consulting Physician: Regina Read MD; Patient Care Tech: Denney Gottron; Patient Care Tech: Yesy Genre; Registered Nurse: Golden Chavez, RN; Registered Nurse: Samantha Tipton, RN; Utilization Reviewer: Adilia Ca RN  Admit Date:  8/8/2020    Chief Complaint: Patient is confused    History of Presenting Illness:      Melly Cobb is a 80 y.o. female on hospital day 2 with a history of UTI. PMH is significant for PAF, HTN, HLD, hypothyroid, DM2, and anemia. Patient seen and examined at bedside for goals of care, family support, symptom management, and code status discussion. Patient was brought to the ER via EMS after sustaining a fall at home. Per her chart, she has been having frequent falls. Upon arrival to the ER she was noted to have multiple contusions and abrasions noted to face, torso, and extremities. She also has UTI. Patient observed laying in bed. Patient is pleasantly confused, alert and oriented to self only. She is not able to provide any history. She has multiple bruises on her face from her recent falls. She wasn't able to answer any questions. She wasn't able to tell me her name. Phone call placed to her son, Maciej Rodriguez to discuss goals of care. Message left for him to return phone call. Review of Systems:       Review of Systems   Unable to perform ROS: Mental status change       Physical Examination:       BP (!) 119/52   Pulse 59   Temp 97.4 °F (36.3 °C) (Oral)   Resp 18   Ht 5' 2\" (1.575 m)   Wt 192 lb (87.1 kg)   SpO2 100%   BMI 35.12 kg/m²    Physical Exam  Constitutional:       General: She is awake.       Appearance: She is well-developed. Interventions: Nasal cannula in place. HENT:      Head: Normocephalic. Nose: No congestion or rhinorrhea. Mouth/Throat:      Mouth: Mucous membranes are moist.   Eyes:      General: No scleral icterus. Neck:      Musculoskeletal: Neck supple. Cardiovascular:      Rate and Rhythm: Normal rate. Pulmonary:      Effort: No respiratory distress. Abdominal:      Palpations: Abdomen is soft. Tenderness: There is no abdominal tenderness. Musculoskeletal:         General: No swelling or tenderness. Skin:     Capillary Refill: Capillary refill takes less than 2 seconds. Findings: Bruising present. Neurological:      Mental Status: She is alert. She is disoriented. Motor: Weakness present. Gait: Gait abnormal.   Psychiatric:         Behavior: Behavior is cooperative.          Allergies:      No Known Allergies    Medications:      Current Facility-Administered Medications   Medication Dose Route Frequency Provider Last Rate Last Dose    rivaroxaban (XARELTO) tablet 20 mg  20 mg Oral Dinner Briana Bright MD   20 mg at 08/09/20 1808    0.9 % sodium chloride infusion   Intravenous Continuous Briana Bright MD 50 mL/hr at 08/10/20 0127      sodium chloride flush 0.9 % injection 10 mL  10 mL Intravenous 2 times per day Greyson Howard, APRN - CNP        sodium chloride flush 0.9 % injection 10 mL  10 mL Intravenous PRN Greyson Howard, APRN - CNP        acetaminophen (TYLENOL) tablet 650 mg  650 mg Oral Q6H PRN Greyson Howard, APRN - CNP        Or    acetaminophen (TYLENOL) suppository 650 mg  650 mg Rectal Q6H PRN Greyson Howard, APRN - CNP        polyethylene glycol (GLYCOLAX) packet 17 g  17 g Oral Daily PRN Greyson Howard, APRN - CNP        promethazine (PHENERGAN) tablet 12.5 mg  12.5 mg Oral Q6H PRN Greyson Howard, APRN - CNP        Or    ondansetron (ZOFRAN) injection 4 mg  4 mg Intravenous Q6H PRN Greyson Howard, APRN - CNP        cefTRIAXone (ROCEPHIN) 1 g None     Physically abused: None     Forced sexual activity: None   Other Topics Concern    None   Social History Narrative         Lives With: Son    Type of Home: House- [address] [facility] in [address] [place] [address] Access: Stairs to enter with rails    Entrance Stairs - Number of Steps: 4 - Rails: Right    Bathroom Shower/Tub: Walk-in shower    Bathroom Equipment: Grab bars in shower    Home Equipment: Rolling walker    ADL Assistance: Needs assistance (son)    Homemaking Assistance: Needs assistance (son)    Ambulation Assistance: Independent (2ww)    Transfer Assistance: Independent    Active : No    Additional Comments: info provided by son who is primary caregiver                 Family Hx:  Family History   Problem Relation Age of Onset    Stroke Mother     Cancer Father     Heart Disease Brother     Heart Attack Brother        LABS: Reviewed     CBC:  Lab Results   Component Value Date    WBC 7.0 08/09/2020    RBC 4.78 08/09/2020    RBC 4.16 09/30/2011    HGB 13.4 08/09/2020    HCT 40.5 08/09/2020    MCV 84.6 08/09/2020    MCH 28.1 08/09/2020    MCHC 33.2 08/09/2020    RDW 15.2 08/09/2020     08/09/2020    MPV 8.6 11/12/2014     CBC with Differential:   Lab Results   Component Value Date    WBC 7.0 08/09/2020    RBC 4.78 08/09/2020    RBC 4.16 09/30/2011    HGB 13.4 08/09/2020    HCT 40.5 08/09/2020     08/09/2020    MCV 84.6 08/09/2020    MCH 28.1 08/09/2020    MCHC 33.2 08/09/2020    RDW 15.2 08/09/2020    LYMPHOPCT 17.9 08/09/2020    MONOPCT 8.5 08/09/2020    EOSPCT 2.8 09/30/2011    BASOPCT 0.4 08/09/2020    MONOSABS 0.6 08/09/2020    LYMPHSABS 1.2 08/09/2020    EOSABS 0.0 08/09/2020    BASOSABS 0.0 08/09/2020     CMP:    Lab Results   Component Value Date     08/09/2020    K 3.4 08/09/2020    CL 99 08/09/2020    CO2 23 08/09/2020    BUN 27 08/09/2020    CREATININE 0.69 08/09/2020    GFRAA >60.0 08/09/2020    LABGLOM >60.0 08/09/2020    GLUCOSE 127 08/09/2020    GLUCOSE 153 09/30/2011    PROT 5.8 08/09/2020    LABALBU 2.9 08/09/2020    LABALBU 4.1 09/30/2011    CALCIUM 8.8 08/09/2020    BILITOT 2.8 08/09/2020    ALKPHOS 63 08/09/2020    AST 33 08/09/2020    ALT 14 08/09/2020     BMP:    Lab Results   Component Value Date     08/09/2020    K 3.4 08/09/2020    CL 99 08/09/2020    CO2 23 08/09/2020    BUN 27 08/09/2020    LABALBU 2.9 08/09/2020    LABALBU 4.1 09/30/2011    CREATININE 0.69 08/09/2020    CALCIUM 8.8 08/09/2020    GFRAA >60.0 08/09/2020    LABGLOM >60.0 08/09/2020    GLUCOSE 127 08/09/2020    GLUCOSE 153 09/30/2011     TSH:   Lab Results   Component Value Date    TSH 6.780 08/09/2020     Vitamin B12 and Folate: No components found for: FOLIC,  G90  Urinalysis:   Lab Results   Component Value Date    NITRU POSITIVE 08/08/2020    WBCUA 10-20 08/08/2020    BACTERIA MANY 08/08/2020    RBCUA None seen 08/08/2020    BLOODU Negative 08/08/2020    SPECGRAV 1.022 08/08/2020    GLUCOSEU Negative 08/08/2020           FUNCTIONAL ADL´S:     Independent: [  ] Eating, [   ] Dressing, [   ] Transferring, [   ] Vera Lazar, [   ] Bathing, [   ] Continence  Dependent   : [x] Eating, [x] Dressing, [x] Transferring, [x] Toileting, [x] Bathing, [x] Continence  W. assistant : [  ] Eating, [   ] Dressing, [   ] Transferring, [   ] Vera Lazar, [   ] Clifm Areas, [   ] Continence    Radiology: Reviewed      Ct Head Wo Contrast    Result Date: 8/9/2020  EXAMINATION: CT of the brain without contrast HISTORY: 24 hours after fall. Intracranial hemorrhage. COMPARISON: CT brain from August 8, 2020ims a aspect of the brain indicate ligament sprain and/or Seng TECHNIQUE: Multiple contiguous axial images were obtained of the brain from the skull base through the vertex. Multiplanar reformats were obtained. FINDINGS: Prominence of the sulci and ventricles compatible with moderate generalized parenchymal volume loss. Gray-white matter differentiation is preserved.  Areas of bilateral supratentorial white matter hypoattenuation are nonspecific but most likely related to  chronic small vessel ischemic changes in a patient of this age. No acute hemorrhage or abnormal extra-axial fluid collection. No mass effect or midline shift. The visualized paranasal sinuses and mastoid air cells are clear. Calvarium is intact. No acute intracranial process or significant interval change. All CT scans at this facility use dose modulation, iterative reconstruction, and/or weight based dosing when appropriate to reduce radiation dose to as low as reasonably achievable. Ct Head Wo Contrast    Result Date: 8/8/2020  CT HEAD WO CONTRAST CLINICAL HISTORY: Multiple falls in past few days COMPARISON: January 8, 2019 TECHNIQUE: Multiple unenhanced serial axial images of the brain from the vertex of the skull to the base of the skull were performed. FINDINGS: The ventricles are dilated. Unchanged size configuration. No mass. No midline shift. The cisterns are patent. There are white matter and periventricular changes most likely consistent with chronic small vessel disease. No acute intra-axial or extra-axial findings. The visualized osseous structures are unremarkable. The visualized portion of the paranasal sinuses, and mastoids are unremarkable. Both globes are intact. No gross preseptal or post septal findings. NO ACUTE INTRA-AXIAL OR EXTRA-AXIAL FINDINGS. All CT scans at this facility use dose modulation, iterative reconstruction, and/or weight based dosing when appropriate to reduce radiation dose to as low as reasonably achievable. Ct Cervical Spine Wo Contrast    Result Date: 8/8/2020  CT CERVICAL SPINE WO CONTRAST CLINICAL HISTORY: Multiple falls COMPARISON: January 8, 2019 Findings: Multiple serial axial images of the cervical spine from the base of the skull through the upper thoracic vertebra with both sagittal and coronal reconstructions was performed. Exam is limited by motion artifact.  There is straightening of the reconstruction was performed. FINDINGS:  There are 5 lumbar type vertebrae. There is diffuse generalized osteopenia. There is multilevel degenerative changes with bridging osteophytes. There is a minimal grade 1 anterolisthesis of L3-4. Disc spaces are diffusely narrowed. The SI joints are intact. No acute fractures. Within the field-of-view there is both a cystic area of the left adrenal as well is a solid component measuring 2.0 cm. The cystic component measures 1.8 cm. There is a partially exophytic cystic area at the interpole region of the right kidney. It measures 1.6 cm.     1. NO ACUTE FRACTURES. 2. SOLID CYSTIC LEFT ADRENAL MASS AS DESCRIBED ABOVE. DOES NOT QUALIFY AS A SIMPLE ADENOMA. THEREFORE FOLLOW-UP AND FURTHER EVALUATION WOULD BE WARRANTED. 2. AREA OF PARTIALLY EXOPHYTIC LOW-ATTENUATION IN THE RIGHT KIDNEY. LIKELY RENAL CYST BUT NOT FULLY CHARACTERIZED IN THIS NONCONTRAST STUDY. CORRELATE CLINICALLY AND FOLLOW-UP All CT scans at this facility use dose modulation, iterative reconstruction, and/or weight based dosing when appropriate to reduce radiation dose to as low as reasonably achievable. Xr Chest Portable    Result Date: 2020  Patient MRN: [id number] : 1935 Age:  80 years Gender: Female Order Date: 2020 3:20 PM. Exam: XR CHEST PORTABLE Number of Views: 1 Indication:  Altered mental status/fall Comparison: 2019 Findings: Stable, enlarged cardiomediastinal silhouette with underlying central pulmonary vascular congestion, right hemidiaphragmatic elevation and thoracic aortic vascular calcifications. Airspace opacification right medial inferior lung base. Impression:  1. Stable, enlarged cardiomediastinal silhouette with underlying central pulmonary vascular congestion right hemidiaphragmatic elevation. 2. Vascular calcifications thoracic aorta.  3. Nonspecific airspace disease medial inferior right lung base could be reflective of infection/infiltrate/pneumonia and/or atelectasis/scarring. 4. Please note this study does not exclude the possibility of underlying CoVid-19 viral infection and/or underlying pulmonary involvement. Ct Maxillofacial Wo Contrast    Result Date: 8/8/2020  UNENHANCED CT SCAN OF THE MAXILLOFACIAL REGION. CLINICAL HISTORY: Multiple falls. Generalized pain. COMPARISON: None TECHNIQUE: Multiple unenhanced serial axial images of the of the maxillofacial region with both sagittal and coronal reconstructions was performed. FINDINGS: The visualized basal brain shows no acute intra-axial or extra-axial findings. There are white matter changes most likely consistent with chronic small vessel ischemic disease. Both globes are intact. No gross preseptal or post septal findings. The visualized paranasal sinuses, frontal, ethmoid, sphenoid, maxillary sinuses are well aerated. No mucoperiosteal thickening, retention cyst and/or polyps or air fluid levels are noted. Both globes are intact. No significant preseptal or post septal findings. There are no acute fractures or focal bony abnormalities. There is some soft tissue changes in the right premaxillary region. Correlate with patient history and exam There are scattered dental caries and periapical lucencies. NO ACUTE FRACTURES. DENTAL DISEASE IS NOTED. FOLLOW-UP All CT scans at this facility use dose modulation, iterative reconstruction, and/or weight based dosing when appropriate to reduce radiation dose to as low as reasonably achievable. Assessment and plan:    1. Altered mental status. Etiology could be multifactorial including dementia, Acute Encephalopathy, and acute cystitis  -Cannot determine baseline mentation at this time  -Continuous re-orientation  -MRI of brain pending  -Neurology consulted    2. Acute cystitis  -Currently taking IV antibiotics  -Encourage fluid intake    3. Cystic adrenal mass  -Oncology consulted    4. Generalized weakness  5. Impaired gait  6. Impaired mobility  7. Frequent falls  -Please consider consulting PT/OT to evaluate and treat    -Palliative Care Encounter  -Micah Boyle is 80years old with multiple comorbidities which includes PAF, HTN, HLD, hypothyroid, DM2, and anemia.   -Considering her comorbidities and her risk for re-hospitalization, she is appropriate for Palliative Care Services.  -Palliative care will continue to follow patient.  -Attempt to contact patient's son. Message left for him to return phone call.    -Advance Care Planning  Discussed goals of care with patient. Explained in extensive detail nuances between full code, DNR CCA and DNR CC. Patient has made the decision to be FULL CODE previously      -Goals of Care Discussion:  Goals of care cannot be determined at this time.       Electronically signed by JENNIFER Hensley CNP on 8/10/2020 at 9:39 AM

## 2020-08-11 ENCOUNTER — TELEPHONE (OUTPATIENT)
Dept: INTERVENTIONAL RADIOLOGY/VASCULAR | Age: 85
End: 2020-08-11

## 2020-08-11 VITALS
BODY MASS INDEX: 35.33 KG/M2 | DIASTOLIC BLOOD PRESSURE: 45 MMHG | HEART RATE: 63 BPM | HEIGHT: 62 IN | OXYGEN SATURATION: 98 % | WEIGHT: 192 LBS | SYSTOLIC BLOOD PRESSURE: 105 MMHG | TEMPERATURE: 97.4 F | RESPIRATION RATE: 16 BRPM

## 2020-08-11 LAB
CULTURE, BLOOD 2: ABNORMAL
GLUCOSE BLD-MCNC: 140 MG/DL (ref 60–115)
GLUCOSE BLD-MCNC: 145 MG/DL (ref 60–115)
HBA1C MFR BLD: 5.9 % (ref 4.8–5.9)
ORGANISM: ABNORMAL
PERFORMED ON: ABNORMAL
PERFORMED ON: ABNORMAL
SARS-COV-2, NAAT: NOT DETECTED
URINE CULTURE, ROUTINE: ABNORMAL

## 2020-08-11 PROCEDURE — 97535 SELF CARE MNGMENT TRAINING: CPT

## 2020-08-11 PROCEDURE — 99233 SBSQ HOSP IP/OBS HIGH 50: CPT | Performed by: PSYCHIATRY & NEUROLOGY

## 2020-08-11 PROCEDURE — APPSS15 APP SPLIT SHARED TIME 0-15 MINUTES: Performed by: NURSE PRACTITIONER

## 2020-08-11 PROCEDURE — 97116 GAIT TRAINING THERAPY: CPT

## 2020-08-11 PROCEDURE — 99232 SBSQ HOSP IP/OBS MODERATE 35: CPT | Performed by: NURSE PRACTITIONER

## 2020-08-11 PROCEDURE — U0002 COVID-19 LAB TEST NON-CDC: HCPCS

## 2020-08-11 PROCEDURE — 83036 HEMOGLOBIN GLYCOSYLATED A1C: CPT

## 2020-08-11 PROCEDURE — 6370000000 HC RX 637 (ALT 250 FOR IP): Performed by: INTERNAL MEDICINE

## 2020-08-11 PROCEDURE — 6370000000 HC RX 637 (ALT 250 FOR IP): Performed by: NURSE PRACTITIONER

## 2020-08-11 PROCEDURE — 36415 COLL VENOUS BLD VENIPUNCTURE: CPT

## 2020-08-11 PROCEDURE — 92526 ORAL FUNCTION THERAPY: CPT

## 2020-08-11 PROCEDURE — 2580000003 HC RX 258: Performed by: NURSE PRACTITIONER

## 2020-08-11 PROCEDURE — 97167 OT EVAL HIGH COMPLEX 60 MIN: CPT

## 2020-08-11 PROCEDURE — 2580000003 HC RX 258: Performed by: INTERNAL MEDICINE

## 2020-08-11 RX ORDER — NICOTINE POLACRILEX 4 MG
15 LOZENGE BUCCAL PRN
Status: DISCONTINUED | OUTPATIENT
Start: 2020-08-11 | End: 2020-08-11 | Stop reason: HOSPADM

## 2020-08-11 RX ORDER — DEXTROSE MONOHYDRATE 25 G/50ML
12.5 INJECTION, SOLUTION INTRAVENOUS PRN
Status: DISCONTINUED | OUTPATIENT
Start: 2020-08-11 | End: 2020-08-11 | Stop reason: HOSPADM

## 2020-08-11 RX ORDER — FOLIC ACID 1 MG/1
1 TABLET ORAL DAILY
Status: DISCONTINUED | OUTPATIENT
Start: 2020-08-11 | End: 2020-08-11 | Stop reason: HOSPADM

## 2020-08-11 RX ORDER — FUROSEMIDE 20 MG/1
20 TABLET ORAL DAILY
Status: DISCONTINUED | OUTPATIENT
Start: 2020-08-11 | End: 2020-08-11 | Stop reason: HOSPADM

## 2020-08-11 RX ORDER — CIPROFLOXACIN 500 MG/1
500 TABLET, FILM COATED ORAL EVERY 12 HOURS SCHEDULED
Qty: 20 TABLET | Refills: 0 | Status: CANCELLED | OUTPATIENT
Start: 2020-08-11 | End: 2020-08-21

## 2020-08-11 RX ORDER — CIPROFLOXACIN 500 MG/1
500 TABLET, FILM COATED ORAL EVERY 12 HOURS SCHEDULED
Status: DISCONTINUED | OUTPATIENT
Start: 2020-08-11 | End: 2020-08-11 | Stop reason: HOSPADM

## 2020-08-11 RX ORDER — DEXTROSE MONOHYDRATE 50 MG/ML
100 INJECTION, SOLUTION INTRAVENOUS PRN
Status: DISCONTINUED | OUTPATIENT
Start: 2020-08-11 | End: 2020-08-11 | Stop reason: HOSPADM

## 2020-08-11 RX ORDER — CIPROFLOXACIN 500 MG/1
500 TABLET, FILM COATED ORAL EVERY 12 HOURS SCHEDULED
Qty: 20 TABLET | Refills: 0 | Status: SHIPPED | OUTPATIENT
Start: 2020-08-11 | End: 2020-08-23 | Stop reason: HOSPADM

## 2020-08-11 RX ADMIN — FUROSEMIDE 20 MG: 20 TABLET ORAL at 12:48

## 2020-08-11 RX ADMIN — CIPROFLOXACIN 500 MG: 500 TABLET, FILM COATED ORAL at 12:48

## 2020-08-11 RX ADMIN — RIVAROXABAN 20 MG: 20 TABLET, FILM COATED ORAL at 16:25

## 2020-08-11 RX ADMIN — LOSARTAN POTASSIUM 25 MG: 25 TABLET, FILM COATED ORAL at 08:51

## 2020-08-11 RX ADMIN — Medication 10 ML: at 08:51

## 2020-08-11 RX ADMIN — LEVOTHYROXINE SODIUM 75 MCG: 75 TABLET ORAL at 08:51

## 2020-08-11 RX ADMIN — FOLIC ACID 1 MG: 1 TABLET ORAL at 16:25

## 2020-08-11 RX ADMIN — ALLOPURINOL 100 MG: 100 TABLET ORAL at 08:52

## 2020-08-11 RX ADMIN — SODIUM CHLORIDE: 9 INJECTION, SOLUTION INTRAVENOUS at 08:51

## 2020-08-11 ASSESSMENT — ENCOUNTER SYMPTOMS
SHORTNESS OF BREATH: 0
COUGH: 0
VOMITING: 0
COLOR CHANGE: 0
CHEST TIGHTNESS: 0
NAUSEA: 0
TROUBLE SWALLOWING: 0
WHEEZING: 0
CONSTIPATION: 0

## 2020-08-11 ASSESSMENT — PAIN SCALES - GENERAL: PAINLEVEL_OUTOF10: 0

## 2020-08-11 NOTE — PROGRESS NOTES
hallucinations. The patient is not nervous/anxious. Physical Exam  Vitals signs and nursing note reviewed. Constitutional:       General: She is not in acute distress. Appearance: She is not ill-appearing or diaphoretic. HENT:      Head: Normocephalic. Eyes:      Extraocular Movements: Extraocular movements intact. Pupils: Pupils are equal, round, and reactive to light. Cardiovascular:      Rate and Rhythm: Normal rate and regular rhythm. Pulmonary:      Effort: Pulmonary effort is normal. No respiratory distress. Breath sounds: Normal breath sounds. Skin:     General: Skin is warm and dry. Neurological:      General: No focal deficit present. Mental Status: She is alert. She is disoriented. Cranial Nerves: No cranial nerve deficit, dysarthria or facial asymmetry. Motor: No tremor or seizure activity.                Medications:  Reviewed    Infusion Medications:    dextrose       Scheduled Medications:    ciprofloxacin  500 mg Oral 2 times per day    insulin lispro  0-6 Units Subcutaneous TID WC    insulin lispro  0-3 Units Subcutaneous Nightly    furosemide  20 mg Oral Daily    rivaroxaban  20 mg Oral Dinner    sodium chloride flush  10 mL Intravenous 2 times per day    allopurinol  100 mg Oral Daily    levothyroxine  75 mcg Oral Daily    losartan  25 mg Oral Daily     PRN Meds: glucose, dextrose, glucagon (rDNA), dextrose, sodium chloride flush, acetaminophen **OR** acetaminophen, polyethylene glycol, promethazine **OR** ondansetron    Labs:   Recent Labs     08/08/20  1415 08/09/20  0955   WBC 9.5 7.0   HGB 13.2 13.4   HCT 40.1 40.5    162     Recent Labs     08/08/20  1415 08/09/20  0955    135   K 3.5 3.4   CL 98 99   CO2 24 23   BUN 33* 27*   CREATININE 0.74 0.69   CALCIUM 9.2 8.8     Recent Labs     08/08/20  1415 08/09/20  0955   AST 37* 33   ALT 15 14   BILITOT 3.4* 2.8*   ALKPHOS 68 63     Recent Labs     08/08/20  1415   INR 1.2 any previous visit. No results found for this or any previous visit. Carotid duplex: No results found for this or any previous visit. No results found for this or any previous visit. No results found for this or any previous visit. Echo No results found for this or any previous visit. Assessment/Plan:    Gait ataxia with multiple falls in a patient with possible atrial fibrillation she is supposed to be on Eliceo Merles has increased confusion and is likely that his baseline dementia from the way she looks in the examination being very pleasantly confused.  The vascular etiology of her dementia is likely. Ej Sanz has had multiple falls and reportedly of recent onset and therefore underlying cerebrovascular events such as strokes must be ruled out.  We will also assess her cervical spine to make sure she does not have canal stenosis or compression causing some of the symptoms.  At bedside I did not appreciate that she has any extrapyramidal findings. Some of her issues appears to be hearing loss and may contribute to her not understanding some of the questions.  I am not quite sure if she has hearing aids at home. Ej Sanz was alone at home when EMS arrived she does have a son who had left.     Gait ataxia secondary to the above related to vasculopathy likely to be small vessel ischemic changes causing the cognitive decline as well.  May relate to a multi-infarct state.     In addition to this patient has a UTI which be contributing to her sudden decline as well.     8/10/20:   Acute encephalopathy with underlying dementia  Gait ataxia with recurrent falls  HX PAF on xarelto  UTI  Left adrenal mass, oncology consulted  Mri Brain pending  MRI cervical spine pending  MRI t spine pending  Palliative care consulted for goals of care conversation  Still attempting to figure out pt baseline orientation.  Further cognitive work-up to be done on outpatient basis once patient's underlying medical issues are resolved. Recommend SNF placement at this time  PT/OT    8/11/20:   MRI brain negative for acute findings. Moderate to marked SVID noted. Likely vascular dementia. MRI cervical spine with degenerative changes. Neurosurgery on consult and reviewed. No central canal stenosis. There is mention of T5 fracture. MRI of thoracic spine pending  Interventional radiology consulted for CT-guided biopsy of left adrenal mass. Folate deficiency noted, will replace  Palliative care continues to follow. DC plan, skilled nursing facility  OK to DC from neuro standpoint    I independently performed an evaluation on this patient. I have reviewed the above documentation completed by the Nurse Practitioner. Please see my additional contributions to the HPI, physical exam, assessment/medical decision making. Horacio Reddy MD, [address], American Board of Psychiatry & Neurology  Board Certified in Vascular Neurology  Board Certified in Neuromuscular Medicine  Certified in Neurorehabilitation         Electronically signed by Dulcy Cowden, APRN - CNP on 8/11/2020 at 12:48 PM

## 2020-08-11 NOTE — FLOWSHEET NOTE
Patient alert, oriented to person, disoriented to time and place. Confused. Impulsive at times. Denies pain. Denies dyspnea. Lemus catheter patent draining kathy/brown colored urine. Large formed bowel movement noted this shift. VS 97.4 105/45 18 63 SPO2 100% RA.

## 2020-08-11 NOTE — PROGRESS NOTES
[place] [id number] [facility]  [facility]  Speech Language Pathology   Treatment Note          Ruben Shafer  1935  [id number]    Medical Dx: UTI (urinary tract infection) [N39.0]  Speech Dx: Dysphagia     8/11/2020    Subjective:  Alert, Confused and Agitated        Interventions used this date:  Dysphagia Treatment    Objective/Assessment:  Patient progressing towards goals:  Short-term Goals  Timeframe for Short-term Goals: 1 week    Goal 1: Pt will tolerate soft and bite size with thin liquids with no overt s/s of aspiration. Patient observed with thin liquids. Patient demonstrating no overt s/s of aspiration in all trials. Goal 2: Pt will tolerate po trials of regular with no overt s/s of difficulty or aspiration. Patient trialed regular solids this date. Patient demonstrated adequate mastication abilities. Patient demonstrating slight oral residue which patient cleared independently utilizing liquid wash. Patient to be upgraded to regular solids/thin liquid diet. EVELYN Lee notified. EVELYN Lee stated that patient was already on general diet in the system and order must've never been changed. SLP reviewed notes and stated that the recommended diet level was soft/bite size but she is now okay to be upgraded to regular/thin. Compensatory Swallowing Strategies: Upright as possible for all oral intake, Small bites/sips, Assist feed    Updated goals:  Goal 1: Pt will tolerate regular solid and thin liquid diet with no overt s/s of aspiration. Frequency updated: 1x meal monitor. Treatment/Activity Tolerance:  Patient tolerated treatment well    Plan: Alter current POC (see above)    Pain Assessment:  Initial Assessment:  Patient denies pain. Re-assessment:  Patient denies pain. Patient/Caregiver Education:  Patient educated on session and progression towards goals.     Safety Devices:  Bed alarm in place and Call light within reach      Therapy Time  SLP Individual Minutes  Time In: 1130  Time Out: 1829  Minutes: 15          Signature: Electronically signed by DION Serna on 8/11/2020 at 11:50 AM

## 2020-08-11 NOTE — PROGRESS NOTES
get out of here. General Comment  Comments: Spoke with RN regvickierding T5 fx and pending MRI. RN ok'd pt to be treated. Pre-Session Pain Report 0/10     Pain Screening  Patient Currently in Pain: Denies     Post-Session Pain Report 0/10        OBJECTIVE        Bed mobility  Rolling to Left: Contact guard assistance  Rolling to Right: Contact guard assistance  Supine to Sit: Minimal assistance; Moderate assistance  Sit to Supine: Minimal assistance  Comment: Min assist from trunk from R side. Moderate assist from L. Assist for 1 LE onto bed with supine to sit. Transfers  Sit to Stand: Contact guard assistance;Minimal Assistance  Stand to sit: Contact guard assistance;Minimal Assistance  Bed to Chair: Minimal assistance(SPT WC to bed and bed to [facility].)  Comment: VCs for safety and technique. Min assist to scoot to edge of bed. Ambulation  Ambulation?: Yes  Ambulation 1  Surface: level tile  Device: Rolling Walker  Assistance: Minimal assistance  Quality of Gait: flexed posture, decreased step length  Distance: 3 laterally to head of bed  Comments: Pt declined to attempt fwd ambulation. States she can't and sat back down. Exercises  Hip Flexion: x10 seated  Knee Long Arc Quad: x10                        ASSESSMENT   Assessment: Pt pleasently confused with decreased safety awareness. Required frequent redirection. Attempted to have pt sit up in highback chair however pt tring to get up. RN requested pt return to bed for safety. Discharge Recommendations:  Continue to assess pending progress, Patient would benefit from continued therapy after discharge    Goals  Long term goals  Long term goal 1: Bed mobility with indep  Long term goal 2: Functional transfers with indep  Long term goal 3: Amb >50ft with 2ww  Long term goal 4: SBA for HEP to improve LE strength and balance  Patient Goals   Patient goals : unable to state    PLAN    Times per week: 3-6  Plan Comment: Cont per POC.    [facility] (6 CLICK) BASIC MOBILITY  AM-PAC Inpatient Mobility Raw Score : 15   Therapy Time   Individual   Time In 1356   Time Out 1420   Minutes 24   Transfers 10  Gait 8  therex 6        Zachary [place], [place], 08/11/20 at 2:37 PM       Definitions for assistance levels  Independent = pt does not require any physical supervision or assistance from another person for activity completion. Device may be needed.   Stand by assistance = pt requires verbal cues or instructions from another person, close to but not touching, to perform the activity  Minimal assistance= pt performs 75% or more of the activity; assistance is required to complete the activity  Moderate assistance= pt performs 50% of the activity; assistance is required to complete the activity  Maximal assistance = pt performs 25% of the activity; assistance is required to complete the activity  Dependent = pt requires total physical assistance to accomplish the task

## 2020-08-11 NOTE — PROGRESS NOTES
Subjective: The patient complains of severe  acute chronic progressive mental status change and generalized fatigue partially relieved by rehydration, rest, PT, OT and exacerbated by recent medical illness. I am concerned about patients severe dementia and ability to participate in rehab. ROS x10: The patient also complains of severely impaired mobility and activities of daily living. Otherwise no new problems with vision, hearing, nose, mouth, throat, dermal, cardiovascular, GI, , pulmonary, musculoskeletal, psychiatric or neurological. See Rehab consult on Rehab chart . Vital signs:  BP (!) 105/45   Pulse 63   Temp 97.4 °F (36.3 °C) (Oral)   Resp 16   Ht 5' 2\" (1.575 m)   Wt 192 lb (87.1 kg)   SpO2 98%   BMI 35.12 kg/m²   I/O:   PO/Intake: 4 2 fair PO intake, no problems observed or reported. Bowel/Bladder:  continent, no problems noted. General:  Patient is well developed, adequately nourished, non-obese and     well kempt. HEENT:    PERRLA, hearing intact to loud voice, external inspection of ear     and nose benign. Inspection of lips, tongue and gums benign  Musculoskeletal: No significant change in strength or tone. All joints stable. Inspection and palpation of digits and nails show no clubbing,       cyanosis or inflammatory conditions. Neuro/Psychiatric: Affect: flat but pleasant. Alert and oriented to person, place and     situation excuse. No significant change in deep tendon reflexes or     sensation  Lungs:  Diminished, CTA-B. Respiration effort is normal at rest.     Heart:   S1 = S2,  RRR. No loud murmurs. Abdomen:  Soft, non-tender, no enlargement of liver or spleen. Extremities:  No significant lower extremity edema or tenderness. Skin:    BUE bruises dt blood draws, no visualized or palpated problems.     Rehabilitation:  Physical therapy: FIMS:  Bed Mobility:      Transfers: Sit to Stand: Minimal Assistance  Stand to sit: Minimal Assistance  Bed to Chair: Minimal assistance, Ambulation 1  Surface: level tile  Device: Rolling Walker  Assistance: Minimal assistance  Quality of Gait: flexed posture  Gait Deviations: Slow Krista, Increased JOHANNA, Decreased step length, Decreased step height  Distance: 2ft  Comments: forward, turning, backward steps to reach bedside chair, VC for safe hand placement with poor follow through,      FIMS:  ,  ,      Occupational therapy: FIMS:   ,  ,      Speech therapy: FIMS:        Lab/X-ray studies reviewed, analyzed and discussed with patient and staff:   Recent Results (from the past 24 hour(s))   POCT Glucose    Collection Time: 08/11/20 11:58 AM   Result Value Ref Range    POC Glucose 145 (H) 60 - 115 mg/dl    Performed on ACCU-CHEK    Hemoglobin A1C    Collection Time: 08/11/20 11:59 AM   Result Value Ref Range    Hemoglobin A1C 5.9 4.8 - 5.9 %   COVID-19    Collection Time: 08/11/20  1:01 PM   Result Value Ref Range    SARS-CoV-2, NAAT Not Detected Not Detected       Previous extensive, complex labs, notes and diagnostics reviewed and analyzed. ALLERGIES:    Allergies as of 08/08/2020    (No Known Allergies)      (please also verify by checking [facility])     Complex Physical Medicine & Rehab Issues Assess & Plan:   1. Severe abnormality of gait and mobility and impaired self-care and ADL's secondary to progressive dementia mental status change status post UTI. Functional and medical status reassessed regarding patients ability to participate in therapies and patient found to be able to participate in skilled versus long-term care  intensive  rehabilitation program including PT/OT to improve balance, ambulation, ADLs, and to improve the P/AROM. It is my opinion that they will be able to tolerate 3 hours of therapy a day and benefit from it at an acute level. 2. Bowel and Bladder dysfunction:  frequent toileting, ambulate to bathroom with assistance, check post void residuals.   Check for C.difficile x1 if >2 loose stools in 24 hours, continue bowel & bladder program.   3. Moderate low back pain and generalized OA pain: reassess pain every shift and prior to and after each therapy session, give prn  Tylenol, modalities prn in therapy, consider Lidoderm, K-pad prn.   4. Skin breakdown risk:  continue pressure relief program.  Daily skin exams and reports from nursing. 5. Severe fatigue due to immobility and nutritional deficits: Add vitamin B12 vitamin D and CoQ10 titrate dosing and add protein supplementation with low carb content. 6. Complex discharge planning:   Because of her dementia should better be served at a lower level of care such as that provided by skilled nursing facility. Complex Active General Medical Issues that complicate care Assess & Plan:     1. Active Problems:    Type 2 diabetes mellitus with hyperglycemia, without long-term current use of insulin (HCC)    HTN (hypertension)    Hypothyroidism    Chronic a-fib    Ataxic gait    Decreased activities of daily living (ADL)    UTI (urinary tract infection)    Encephalopathy acute    Multiple falls    Closed head injury    Goals of care, counseling/discussion    Disorientation    Dehydration    Left adrenal mass (HCC)    Acute encephalopathy  Resolved Problems:    * No resolved hospital problems.  EZ Rios MD.O., PM&R     Attending    [address]

## 2020-08-11 NOTE — PROGRESS NOTES
[facility] OCCUPATIONAL THERAPY EVALUATION - ACUTE     NAME: Andrew Varela  : 1935 (80 y.o.)  MRN: [id number]  CODE STATUS: Full Code  Room: S788/O655-88    Date of Service: 2020    Patient Diagnosis(es): UTI (urinary tract infection) [N39.0]   Chief Complaint   Patient presents with    Fall     alterned mental status      Patient Active Problem List    Diagnosis Date Noted    Acute encephalopathy     Encephalopathy acute     Multiple falls     Closed head injury     Goals of care, counseling/discussion     Disorientation     Dehydration     Left adrenal mass ([facility] [address]     UTI (urinary tract infection) 2020    Recurrent UTI 2019    Hypokalemia 2019    Decreased activities of daily living (ADL) 2019    Hypomagnesemia 2019    OA (osteoarthritis) 01/10/2019    Ataxic gait 2019    Dementia ([facility] [address] 2019    Chronic gout 2019    Blood thinned due to long-term anticoagulant use 2019    DJD (degenerative joint disease), cervical 2019    Chronic a-fib 09/15/2015    Obesity 2013    HTN (hypertension) 2013    Hypothyroidism 2013    Edema 2013    Type 2 diabetes mellitus with hyperglycemia, without long-term current use of insulin ([facility] [address] 10/03/2012        Past Medical History:   Diagnosis Date    Anticoagulant long-term use     Arthritis     Atrial fibrillation (HCC)     DJD (degenerative joint disease), cervical 2019    Hypertension     Hypothyroidism     Obesity     Type II or unspecified type diabetes mellitus without mention of complication, not stated as uncontrolled      Past Surgical History:   Procedure Laterality Date    FRACTURE SURGERY          Restrictions  Restrictions/Precautions: Fall Risk     Safety Devices: Safety Devices  Safety Devices in place: Yes  Type of devices:  All fall risk precautions in place        Subjective  Pre Treatment Pain Screening  Pain at present: 0  Scale Used: safety  Problem Solving: Assistance required to implement solutions, Assistance required to identify errors made, Assistance required to generate solutions, Assistance required to correct errors made  Insights: Not aware of deficits  Initiation: Requires cues for all  Sequencing: Requires cues for all  Cognition Comment: Max increased time for sequencing, problem solving, following commands    Perception Status:  Perception  Overall Perceptual Status: WFL    Sensation Status:  Sensation  Overall Sensation Status: [facility]    Vision and Hearing Status:  Vision  Vision: (appear to function in room environement; limited assessment d/t confusion)  Hearing  Hearing: Exceptions to WFL(requires frequent repetion and rephrasing)  Hearing Exceptions: Hard of hearing/hearing concerns     ROM:   LUE AROM (degrees)  LUE AROM : WFL  LUE General AROM: Limited at shoulders; grossly WFL  Left Hand AROM (degrees)  Left Hand AROM: WFL  RUE AROM (degrees)  RUE AROM : WFL  RUE General AROM: Limited at shoulders; grossly WFL  Right Hand AROM (degrees)  Right Hand AROM: WFL    Strength:  LUE Strength  Gross LUE Strength: Exceptions to [facility]  L Hand General: 3/5  LUE Strength Comment: 3/5 all planes  RUE Strength  Gross RUE Strength: Exceptions to [facility]  R Hand General: 3/5  RUE Strength Comment: 3/5 all planes    Coordination, Tone, Quality of Movement: Tone RUE  RUE Tone: Normotonic  Tone LUE  LUE Tone: Normotonic  Coordination  Movements Are Fluid And Coordinated: No  Coordination and Movement description: Fine motor impairments, Decreased speed, Decreased accuracy, Right UE, Left UE    Hand Dominance:  Hand Dominance  Hand Dominance: Right    ADL Status:  ADL  Feeding: Setup  Grooming: Minimal assistance  UE Bathing: Maximum assistance  LE Bathing: Maximum assistance  UE Dressing: Maximum assistance  LE Dressing: Dependent/Total  Toileting: Dependent/Total  Additional Comments: Simulated ADLs as above.  Significant difficulty sequencing tasks, increased time for problem solving throughout  Toilet Transfers  Toilet Transfer: Unable to assess  Toilet Transfers Comments: Anticipate max A based on other mobility       Therapy key for assistance levels -   Independent = Pt. is able to perform task with no assistance but may require a device   Stand by assistance = Pt. does not perform task at an independent level but does not need physical assistance, requires verbal cues  Minimal, Moderate, Maximal Assistance = Pt. requires physical assistance (25%, 50%, 75% assist from helper) for task but is able to actively participate in task   Dependent = Pt. requires total assistance with task and is not able to actively participate with task completion     Functional Mobility:  Functional Mobility  Functional - Mobility Device: No device  Activity: Other  Assist Level: Minimal assistance  Functional Mobility Comments: Min A for 2 shuffle steps  Transfers  Sit to stand:  Moderate assistance  Stand to sit: Moderate assistance  Transfer Comments: Max increased time to sequence    Bed Mobility  Bed mobility  Supine to Sit: Moderate assistance  Sit to Supine: Dependent/Total(pt positioned in bedside chair, SLP present upon departure)  Comment: Pt. required max increased time for mobility    Seated and Standing Balance:  Balance  Sitting Balance: Stand by assistance  Standing Balance: Minimal assistance    Functional Endurance:  Activity Tolerance  Activity Tolerance: Patient Tolerated treatment well  Activity Tolerance: Pt. requires cues and encouragement    D/C Recommendations:  OT D/C RECOMMENDATIONS  REQUIRES OT FOLLOW UP: Yes    Equipment Recommendations:  OT Equipment Recommendations  Other: Continue to assess    OT Education:   OT Education  OT Education: OT Role, Plan of Care  Patient Education: Pt. educated on importance of mobility  Barriers to Learning: Confusion    OT Follow Up:  OT D/C RECOMMENDATIONS  REQUIRES OT FOLLOW UP: Yes       Assessment/Discharge Disposition:  Assessment: Pt. is an 80year old woman from home who presents to [facility] with the above deficits which impact her ability to perform ADLs and IADLs. Pt. would benefit from the above deficits which impact her ability to perform ADLs and IADLs. Pt. would benefit from OT to maximize independence and safety with ADL tasks.   Performance deficits / Impairments: Decreased functional mobility , Decreased ADL status, Decreased strength, Decreased balance, Decreased endurance, Decreased high-level IADLs, Decreased fine motor control, Decreased coordination, Decreased cognition, Decreased safe awareness  Prognosis: Good  Discharge Recommendations: Continue to assess pending progress  Decision Making: High Complexity  History: Pt's medical history is complex  Exam: Pt. has 10 performance deficits  Assistance / Modification: Pt. requires max A    Six Click Score   How much help for putting on and taking off regular lower body clothing?: Total  How much help for Bathing?: A Lot  How much help for Toileting?: A Lot  How much help for putting on and taking off regular upper body clothing?: A Lot  How much help for taking care of personal grooming?: A Little  How much help for eating meals?: A Little  AM-[facility] Inpatient Daily Activity Raw Score: 13  AM-PAC Inpatient ADL T-Scale Score : 32.03  ADL Inpatient CMS 0-100% Score: 63.03    Plan:  Plan  Times per week: 1-3x  Plan weeks: Length of acute stay  Current Treatment Recommendations: Strengthening, Balance Training, Functional Mobility Training, Endurance Training, Pain Management, Safety Education & Training, Patient/Caregiver Education & Training, Self-Care / ADL, Equipment Evaluation, Education, & procurement, Home Management Training, Cognitive/Perceptual Training    Goals:   Patient will:    - Improve functional endurance to tolerate/complete 30 mins of ADL's  - Be Setup in UB ADLs   - Be Min A in LB ADLs  - Be Min A in ADL transfers without LOB  - Be Min A in toileting tasks  - Improve B hand fine motor coordination to [facility] in order to manage clothing fasteners/self-care containers in a timely manner  - Improve B UE strength and endurance to 3+/5 in order to participate in self-care activities as projected. - Access appropriate D/C site with as few architectural barriers as possible. - Sequence self-care tasks with no verbal cues for safety    Patient Goal: Patient goals : \"I want to get home\"     Discussed and agreed upon: Yes Comments:     Therapy Time:   OT Individual Minutes  Time In: 1310  Time Out: 1320  Minutes: 10    Eval: 10 minutes     Electronically signed by:     MARELY Lutz  8/11/2020, 1:48 PM

## 2020-08-11 NOTE — PROGRESS NOTES
Musculoskeletal: Neck supple. Cardiovascular:      Rate and Rhythm: Normal rate. Pulmonary:      Effort: No respiratory distress. Abdominal:      Palpations: Abdomen is soft. Tenderness: There is no abdominal tenderness. Musculoskeletal:         General: No swelling or tenderness. Skin:     Capillary Refill: Capillary refill takes less than 2 seconds. Findings: Bruising present. Neurological:      Mental Status: She is alert. She is disoriented. Motor: Weakness present. Gait: Gait abnormal.   Psychiatric:         Behavior: Behavior is cooperative.          Allergies:      No Known Allergies    Medications:      Current Facility-Administered Medications   Medication Dose Route Frequency Provider Last Rate Last Dose    rivaroxaban (XARELTO) tablet 20 mg  20 mg Oral Dinner Satnam Moore MD   20 mg at 08/10/20 1727    0.9 % sodium chloride infusion   Intravenous Continuous Satnam Moore MD 50 mL/hr at 08/11/20 0851      sodium chloride flush 0.9 % injection 10 mL  10 mL Intravenous 2 times per day William Halsted, APRN - CNP   10 mL at 08/11/20 0851    sodium chloride flush 0.9 % injection 10 mL  10 mL Intravenous PRN William Halsted, APRN - CNP        acetaminophen (TYLENOL) tablet 650 mg  650 mg Oral Q6H PRN William Halsted, APRN - CNP        Or    acetaminophen (TYLENOL) suppository 650 mg  650 mg Rectal Q6H PRN William Halsted, APRN - CNP        polyethylene glycol (GLYCOLAX) packet 17 g  17 g Oral Daily PRN William Halsted, APRN - CNP        promethazine (PHENERGAN) tablet 12.5 mg  12.5 mg Oral Q6H PRN [place] Halsted, APRN - CNP        Or    ondansetron (ZOFRAN) injection 4 mg  4 mg Intravenous Q6H PRN [place] Halsted, APRN - CNP        cefTRIAXone (ROCEPHIN) 1 g IVPB in 50 mL D5W minibag  1 g Intravenous Q24H [place] Halsted, APRN - CNP   Stopped at 08/10/20 2053    allopurinol (ZYLOPRIM) tablet 100 mg  100 mg Oral Daily Jones Cazares, APRN - CNP   100 mg at 08/11/20 1405    levothyroxine (SYNTHROID) tablet 75 mcg  75 mcg Oral Daily Yunier Humphreys, APRN - CNP   75 mcg at 08/11/20 0851    losartan (COZAAR) tablet 25 mg  25 mg Oral Daily Joellen Estefani Cazares, APRN - CNP   25 mg at 08/11/20 2258       History: PMHx:  Past Medical History:   Diagnosis Date    Anticoagulant long-term use     Arthritis     Atrial fibrillation (HCC)     DJD (degenerative joint disease), cervical 1/9/2019    Hypertension     Hypothyroidism     Obesity     Type II or unspecified type diabetes mellitus without mention of complication, not stated as uncontrolled        PSHx:  Past Surgical History:   Procedure Laterality Date    FRACTURE SURGERY         Social Hx:  Social History     Socioeconomic History    Marital status:       Spouse name: None    Number of children: None    Years of education: None    Highest education level: None   Occupational History    None   Social Needs    Financial resource strain: None    Food insecurity     Worry: None     Inability: None    Transportation needs     Medical: None     Non-medical: None   Tobacco Use    Smoking status: Former Smoker    Smokeless tobacco: Never Used   Substance and Sexual Activity    Alcohol use: No    Drug use: No    Sexual activity: Never   Lifestyle    Physical activity     Days per week: None     Minutes per session: None    Stress: None   Relationships    Social connections     Talks on phone: None     Gets together: None     Attends Alevism service: None     Active member of club or organization: None     Attends meetings of clubs or organizations: None     Relationship status: None    Intimate partner violence     Fear of current or ex partner: None     Emotionally abused: None     Physically abused: None     Forced sexual activity: None   Other Topics Concern    None   Social History Narrative         Lives With: Son    Type of Home: House- [address] in [address] [place] [address] Access: Stairs to enter with rails    Entrance Stairs - Number of Steps: 4 - Rails: Right    Bathroom Shower/Tub: Walk-in shower    Bathroom Equipment: Grab bars in shower    Home Equipment: Rolling walker    ADL Assistance: Needs assistance (son)    Homemaking Assistance: Needs assistance (son)    Ambulation Assistance: Independent (2ww)    Transfer Assistance: Independent    Active : No    Additional Comments: info provided by son who is primary caregiver                 Family Hx:  Family History   Problem Relation Age of Onset    Stroke Mother     Cancer Father     Heart Disease Brother     Heart Attack Brother        LABS: Reviewed     CBC:  Lab Results   Component Value Date    WBC 7.0 08/09/2020    RBC 4.78 08/09/2020    RBC 4.16 09/30/2011    HGB 13.4 08/09/2020    HCT 40.5 08/09/2020    MCV 84.6 08/09/2020    MCH 28.1 08/09/2020    MCHC 33.2 08/09/2020    RDW 15.2 08/09/2020     08/09/2020    MPV 8.6 11/12/2014     CBC with Differential:   Lab Results   Component Value Date    WBC 7.0 08/09/2020    RBC 4.78 08/09/2020    RBC 4.16 09/30/2011    HGB 13.4 08/09/2020    HCT 40.5 08/09/2020     08/09/2020    MCV 84.6 08/09/2020    MCH 28.1 08/09/2020    MCHC 33.2 08/09/2020    RDW 15.2 08/09/2020    LYMPHOPCT 17.9 08/09/2020    MONOPCT 8.5 08/09/2020    EOSPCT 2.8 09/30/2011    BASOPCT 0.4 08/09/2020    MONOSABS 0.6 08/09/2020    LYMPHSABS 1.2 08/09/2020    EOSABS 0.0 08/09/2020    BASOSABS 0.0 08/09/2020     CMP:    Lab Results   Component Value Date     08/09/2020    K 3.4 08/09/2020    CL 99 08/09/2020    CO2 23 08/09/2020    BUN 27 08/09/2020    CREATININE 0.69 08/09/2020    GFRAA >60.0 08/09/2020    LABGLOM >60.0 08/09/2020    GLUCOSE 127 08/09/2020    GLUCOSE 153 09/30/2011    PROT 5.8 08/09/2020    LABALBU 2.9 08/09/2020    LABALBU 4.1 09/30/2011    CALCIUM 8.8 08/09/2020    BILITOT 2.8 08/09/2020    ALKPHOS 63 08/09/2020    AST 33 08/09/2020    ALT 14 08/09/2020     BMP:    Lab May be due to posterior facet arthropathy. No acute fractures. There appears to be high density material within the puriform sinuses. This patient taking any over-the-counter medication or undergone recent radiological swallowing study. Correlate with patient history. .     NO ACUTE FRACTURES. OTHER FINDINGS DETAILED ABOVE All CT scans at this facility use dose modulation, iterative reconstruction, and/or weight based dosing when appropriate to reduce radiation dose to as low as reasonably achievable. Ct Thoracic Spine Wo Contrast    Result Date: 8/8/2020  EXAMINATION:  CT SCAN THORACIC SPINE CLINICAL HISTORY:  Multiple falls COMPARISON:  None TECHNIQUE:  Multiple serial axial images of the thoracic spine from the lower cervical through the upper lumbar vertebra with both sagittal coronal reconstruction was performed. FINDINGS:  There is a moderate dorsal kyphosis. There is a diffuse generalized osteopenia. There is multilevel degenerative changes. The disc spaces are intact. No significant spondylolisthesis. There is some loss of height of the T5 vertebra. May represent age-indeterminate compression fracture. No other fracture or focal bony abnormalities. SOME LOSS OF HEIGHT OF THE T5 VERTEBRA. MAY REPRESENT AGE-INDETERMINATE COMPRESSION FRACTURE. NO OTHER FRACTURE All CT scans at this facility use dose modulation, iterative reconstruction, and/or weight based dosing when appropriate to reduce radiation dose to as low as reasonably achievable. Ct Lumbar Spine Wo Contrast    Result Date: 8/8/2020  EXAMINATION:  CT SCAN LUMBAR SPINE CLINICAL HISTORY:  Multiple falls COMPARISON:  None TECHNIQUE:  Multiple serial axial images from the T11 through the sacral levels with both sagittal coronal reconstruction was performed. FINDINGS:  There are 5 lumbar type vertebrae. There is diffuse generalized osteopenia. There is multilevel degenerative changes with bridging osteophytes.  There is a minimal grade 1 anterolisthesis of L3-4. Disc spaces are diffusely narrowed. The SI joints are intact. No acute fractures. Within the field-of-view there is both a cystic area of the left adrenal as well is a solid component measuring 2.0 cm. The cystic component measures 1.8 cm. There is a partially exophytic cystic area at the interpole region of the right kidney. It measures 1.6 cm.     1. NO ACUTE FRACTURES. 2. SOLID CYSTIC LEFT ADRENAL MASS AS DESCRIBED ABOVE. DOES NOT QUALIFY AS A SIMPLE ADENOMA. THEREFORE FOLLOW-UP AND FURTHER EVALUATION WOULD BE WARRANTED. 2. AREA OF PARTIALLY EXOPHYTIC LOW-ATTENUATION IN THE RIGHT KIDNEY. LIKELY RENAL CYST BUT NOT FULLY CHARACTERIZED IN THIS NONCONTRAST STUDY. CORRELATE CLINICALLY AND FOLLOW-UP All CT scans at this facility use dose modulation, iterative reconstruction, and/or weight based dosing when appropriate to reduce radiation dose to as low as reasonably achievable. Xr Chest Portable    Result Date: 2020  Patient MRN: [id number] : 1935 Age:  80 years Gender: Female Order Date: 2020 3:20 PM. Exam: XR CHEST PORTABLE Number of Views: 1 Indication:  Altered mental status/fall Comparison: 2019 Findings: Stable, enlarged cardiomediastinal silhouette with underlying central pulmonary vascular congestion, right hemidiaphragmatic elevation and thoracic aortic vascular calcifications. Airspace opacification right medial inferior lung base. Impression:  1. Stable, enlarged cardiomediastinal silhouette with underlying central pulmonary vascular congestion right hemidiaphragmatic elevation. 2. Vascular calcifications thoracic aorta. 3. Nonspecific airspace disease medial inferior right lung base could be reflective of infection/infiltrate/pneumonia and/or atelectasis/scarring. 4. Please note this study does not exclude the possibility of underlying CoVid-19 viral infection and/or underlying pulmonary involvement.      Ct Maxillofacial Wo Contrast    Result Date: -Considering her comorbidities and her risk for re-hospitalization, she is appropriate for Palliative Care Services.  -Palliative care will continue to follow patient.  -Attempt to contact patient's son. Message left for him to return phone call. [facility] Contacted patient's son, Ara Byers for [address], ACP, and initial discussion on pall care philosophy. Call for any questions, concerns or change in condition. Much support, guidance and active listening provided    -Advance Care Planning  Discussed goals of care with patient. Explained in extensive detail nuances between full code, DNR CCA and DNR CC. Patient has made the decision to be FULL CODE previously      -Goals of Care Discussion:  Spoke with patient's son, Ara Byers. He is in agreement with Palliative Care service for his mother. Disease process and goals of treatment were discussed in basic terms. Jenise's goal is to optimize available comfort care measures to decrease hospitalization and prevent ER visits, manage symptomology with future chemo and immunotherapy. We discussed the palliative care philosophy in light of those goals. We discussed all care options contingent on treatment response and QOL.  Much active listening, presence, and emotional      Electronically signed by JENNIFER Tena CNP on 8/11/2020 at 9:23 AM

## 2020-08-11 NOTE — FLOWSHEET NOTE
Dr. Romeo Alfaro cleared patient for discharge to [facility]. [address] ordered [facility] p/u [id number]. Report called into [facility] nurse Pam Alexander states she has not further questions or concerns.

## 2020-08-11 NOTE — TELEPHONE ENCOUNTER
Another message left for son, Maciej Rodriguez, who is POA, to call IR office to discuss patients impending adrenal biopsy. Unable to leave message on home phone. Message left on mobile phone.

## 2020-08-11 NOTE — PROGRESS NOTES
Pt's assessment is complete. The pt is alert/oriented to self, is not sure where she is, and why she is here. Pt is resting in bed comfortably, not c/o pain. Will monitor.

## 2020-08-11 NOTE — CONSULTS
79-year-old female with some cognitive issues who is had multiple falls over the last several days. She was on the floor crawling when she the EMS found her and brought her into the emergency room. She is denying any pain but she has some superficial bruising and abrasions about the face. She does have a left adrenal solid cyst and a cystic left adrenal mass. She is being seen by hematology oncology Dr. Maurizio Farrell. Patient is unable to give a history. She just says that she is not sure what is going on but she does not have any significant pain and sometimes she just does not know what to do. Physical Exam  Vitals signs and nursing note reviewed. Constitutional:       General: She is not in acute distress. Appearance: She is well-developed. She is not diaphoretic. HENT:      Head: Normocephalic. Contusion present. No raccoon eyes. Mouth/Throat:      Pharynx: No oropharyngeal exudate. Eyes:      General: No scleral icterus. Conjunctiva/sclera: Conjunctivae normal.      Pupils: Pupils are equal, round, and reactive to light. Neck:      Musculoskeletal: Normal range of motion and neck supple. Trachea: No tracheal deviation. Cardiovascular:      Rate and Rhythm: Normal rate. Heart sounds: Normal heart sounds. Pulmonary:      Effort: Pulmonary effort is normal. No respiratory distress. Breath sounds: Normal breath sounds. Abdominal:      General: Bowel sounds are normal. There is no distension. Palpations: Abdomen is soft. Musculoskeletal: Normal range of motion. Left shoulder: She exhibits swelling. She exhibits normal range of motion, no tenderness, no bony tenderness, no deformity and no pain. Arms: Patient has poor volitional effort. I did not ambulate her. His gross movement in all extremities.     Skin:     General: Skin is warm and dry. Findings: No erythema or rash. Neurological:      Mental Status: She is alert. She is disoriented. Cranial Nerves: No cranial nerve deficit. Motor: No abnormal muscle tone. Psychiatric:         Behavior: Behavior normal.         Thought Content: Thought content normal.         Judgment: Judgment normal.        CT scan on admission shows a minimal insufficiency fracture T5. Patient is complaining of no pain in this area. MRI of the cervical spine does not show any significant canal narrowing or myelopathy. MRI thoracic spine is pending. Impression minimal insufficiency osteoporotic fracture T5, healing. 2.  Diabetes mellitus type 2 non-insulin-dependent. 3.  Dementia. 4.  Left adrenal gland cyst and renal cysts. 5.  Hypertension   6. Chronic atrial fibrillation. The current fracture T5 should heal well. At this time the patient is not a candidate for consideration of surgery. Neurosurgery will follow up on the MRI of the thoracic spine.

## 2020-08-11 NOTE — CARE COORDINATION
[phone] has accepted the pt for admission today and ambulance has been set to pick pt up at 6:30. Message left for son. Spoke with pt's daughter Rosa Maria Suh regarding DC.

## 2020-08-12 RX ORDER — FOLIC ACID 1 MG/1
1 TABLET ORAL DAILY
Qty: 30 TABLET | Refills: 0 | Status: ON HOLD | OUTPATIENT
Start: 2020-08-12 | End: 2020-08-22

## 2020-08-12 NOTE — DISCHARGE SUMMARY
(with meals) 1 vial 3    ciprofloxacin (CIPRO) 500 MG tablet Take 1 tablet by mouth every 12 hours for 10 days 20 tablet 0    levothyroxine (SYNTHROID) 75 MCG tablet TAKE 1 TABLET BY MOUTH DAILY 30 tablet 0    Cholecalciferol (VITAMIN D3) 1.25 MG (16926 UT) CAPS TAKE 1 CAPSULE BY MOUTH ONCE A WEEK 12 capsule 1    losartan (COZAAR) 25 MG tablet Take 1 tablet by mouth daily 30 tablet 0    [facility] UNIFINE PENTIPS 31G X 8 MM MISC USE AS DIRECTED ONCE DAILY 100 each 3    magnesium oxide (MAG-OX) 400 (241.3 Mg) MG TABS tablet TAKE 1 TABLET BY MOUTH TWICE DAILY 30 tablet 2    furosemide (LASIX) 40 MG tablet TAKE 1 & 1/2 (ONE AND ONE-HALF) TABLETS BY MOUTH EVERY MONDAY, WEDNESDAY & FRIDAY, and TAKE 1 TABLET EVERY Tuesday, THURSDAY, SATURDay & sun 60 tablet 1    allopurinol (ZYLOPRIM) 100 MG tablet TAKE 1 TABLET BY MOUTH DAILY 30 tablet 5    rivaroxaban (XARELTO) 20 MG TABS tablet Take 1 tablet by mouth Daily with supper 30 tablet 5    Blood Glucose Monitoring Suppl (TRUE METRIX METER) w/Device KIT Use 4 times a day.  0    Blood Glucose Monitoring Suppl KIT Patient to test 4 times a day. Dx: E11.65. Please dispense the brand covered by patient's insurance. 1 kit 0    blood glucose monitor strips Patient to test 4 times daily. Dx: E11.65. Please dispense the brand that is covered by insurance. 400 strip 11    Lancets MISC Patient to test 4 times a day. Dx: E11.65. Please dispense the brand covered by patient's insurance. 400 each 11    Skin Protectants, Misc. (EUCERIN) cream Apply topically as needed for Dry Skin Apply topically as needed. Discharge Exam:  HEENT: AT/NC, PERRLA, no JVD  HEART: s1/s2 wnl w/o s3  LUNG: clear  ABD: soft, NT  EXT: no edema  SKin : no rash  Neuro:no focal deficits      Disposition: SN    Patient Instructions:   [unfilled]  Activity: as tolerated  Diet:diabetic diet    Follow-up with PCP in 1 week  REPEAT TSH level in 2 weeks  FU HGBA! C  TSH could be abnormal due to Vitamin D3 1.25 MG (24375 UT) Caps  TAKE 1 CAPSULE BY MOUTH ONCE A WEEK         * This list has 2 medication(s) that are the same as other medications prescribed for you. Read the directions carefully, and ask your doctor or other care provider to review them with you.             STOP taking these medications    Basaglar KwikPen 100 UNIT/ML injection pen  Generic drug:  insulin glargine     potassium chloride 10 MEQ extended release tablet  Commonly known as:  KLOR-CON M           Where to Get Your Medications      You can get these medications from any pharmacy    Bring a paper prescription for each of these medications  · ciprofloxacin 500 MG tablet  · insulin lispro 100 UNIT/ML injection vial  · insulin lispro 100 UNIT/ML injection vial         Signed:  Sanya Castro  8/12/2020  8:59 AM

## 2020-08-13 LAB — BLOOD CULTURE, ROUTINE: NORMAL

## 2020-08-13 NOTE — PROGRESS NOTES
Physical Therapy  Facility/Department: [facility] [id number]  Physical Therapy Discharge      NAME: Alisa Barnett    : 1935 (80 y.o.)  MRN: [id number]    Account: [de-identified]  Gender: female      Patient has been discharged from acute care hospital. DC patient from current PT program.      Electronically signed by Kristan Powell PT on 20 at 1:44 PM EDT

## 2020-08-14 ENCOUNTER — TELEPHONE (OUTPATIENT)
Dept: INTERVENTIONAL RADIOLOGY/VASCULAR | Age: 85
End: 2020-08-14

## 2020-08-14 NOTE — TELEPHONE ENCOUNTER
Notified Alysa Moore at Oncology center that I'm unable to contact son Ara Byers who is POA to discuss procedure and get consent for Lt. Adrenal mass Bx. Please notify Dr. Tuan Rebollar.

## 2020-08-21 ENCOUNTER — APPOINTMENT (OUTPATIENT)
Dept: CT IMAGING | Age: 85
DRG: 445 | End: 2020-08-21
Payer: MEDICARE

## 2020-08-21 ENCOUNTER — HOSPITAL ENCOUNTER (INPATIENT)
Age: 85
LOS: 1 days | Discharge: NURSING, REHAB OR LONG-TERM CARE | DRG: 445 | End: 2020-08-23
Attending: EMERGENCY MEDICINE | Admitting: INTERNAL MEDICINE
Payer: MEDICARE

## 2020-08-21 LAB
ALBUMIN SERPL-MCNC: 2.7 G/DL (ref 3.5–4.6)
ALP BLD-CCNC: 597 U/L (ref 40–130)
ALT SERPL-CCNC: 34 U/L (ref 0–33)
AMMONIA: 17 UMOL/L (ref 11–51)
ANION GAP SERPL CALCULATED.3IONS-SCNC: 8 MEQ/L (ref 9–15)
AST SERPL-CCNC: 115 U/L (ref 0–35)
BACTERIA: NEGATIVE /HPF
BASOPHILS ABSOLUTE: 0 K/UL (ref 0–0.2)
BASOPHILS RELATIVE PERCENT: 0.4 %
BILIRUB SERPL-MCNC: 3.2 MG/DL (ref 0.2–0.7)
BILIRUBIN URINE: ABNORMAL
BLOOD, URINE: NEGATIVE
BUN BLDV-MCNC: 42 MG/DL (ref 8–23)
CALCIUM SERPL-MCNC: 8.9 MG/DL (ref 8.5–9.9)
CHLORIDE BLD-SCNC: 90 MEQ/L (ref 95–107)
CLARITY: CLEAR
CO2: 33 MEQ/L (ref 20–31)
COLOR: ABNORMAL
CREAT SERPL-MCNC: 1.57 MG/DL (ref 0.5–0.9)
EOSINOPHILS ABSOLUTE: 0.1 K/UL (ref 0–0.7)
EOSINOPHILS RELATIVE PERCENT: 0.9 %
EPITHELIAL CELLS, UA: ABNORMAL /HPF (ref 0–5)
GFR AFRICAN AMERICAN: 37.8
GFR NON-AFRICAN AMERICAN: 31.3
GLOBULIN: 2.9 G/DL (ref 2.3–3.5)
GLUCOSE BLD-MCNC: 124 MG/DL (ref 70–99)
GLUCOSE URINE: NEGATIVE MG/DL
HCT VFR BLD CALC: 35.4 % (ref 37–47)
HEMOGLOBIN: 11.8 G/DL (ref 12–16)
HYALINE CASTS: ABNORMAL /HPF (ref 0–5)
KETONES, URINE: NEGATIVE MG/DL
LEUKOCYTE ESTERASE, URINE: ABNORMAL
LYMPHOCYTES ABSOLUTE: 1.4 K/UL (ref 1–4.8)
LYMPHOCYTES RELATIVE PERCENT: 13.8 %
MCH RBC QN AUTO: 28 PG (ref 27–31.3)
MCHC RBC AUTO-ENTMCNC: 33.3 % (ref 33–37)
MCV RBC AUTO: 83.9 FL (ref 82–100)
MONOCYTES ABSOLUTE: 0.6 K/UL (ref 0.2–0.8)
MONOCYTES RELATIVE PERCENT: 6.3 %
NEUTROPHILS ABSOLUTE: 7.9 K/UL (ref 1.4–6.5)
NEUTROPHILS RELATIVE PERCENT: 78.6 %
NITRITE, URINE: NEGATIVE
PDW BLD-RTO: 16.2 % (ref 11.5–14.5)
PH UA: 5 (ref 5–9)
PLATELET # BLD: 154 K/UL (ref 130–400)
POTASSIUM SERPL-SCNC: 3.1 MEQ/L (ref 3.4–4.9)
PROTEIN UA: NEGATIVE MG/DL
RBC # BLD: 4.22 M/UL (ref 4.2–5.4)
RBC UA: ABNORMAL /HPF (ref 0–5)
SODIUM BLD-SCNC: 131 MEQ/L (ref 135–144)
SPECIFIC GRAVITY UA: 1.01 (ref 1–1.03)
TOTAL PROTEIN: 5.6 G/DL (ref 6.3–8)
URINE REFLEX TO CULTURE: ABNORMAL
UROBILINOGEN, URINE: 1 E.U./DL
WBC # BLD: 10.1 K/UL (ref 4.8–10.8)
WBC UA: ABNORMAL /HPF (ref 0–5)
YEAST: PRESENT /HPF

## 2020-08-21 PROCEDURE — 6360000004 HC RX CONTRAST MEDICATION: Performed by: EMERGENCY MEDICINE

## 2020-08-21 PROCEDURE — 99285 EMERGENCY DEPT VISIT HI MDM: CPT

## 2020-08-21 PROCEDURE — 2580000003 HC RX 258: Performed by: EMERGENCY MEDICINE

## 2020-08-21 PROCEDURE — 6360000002 HC RX W HCPCS: Performed by: EMERGENCY MEDICINE

## 2020-08-21 PROCEDURE — 83690 ASSAY OF LIPASE: CPT

## 2020-08-21 PROCEDURE — 36415 COLL VENOUS BLD VENIPUNCTURE: CPT

## 2020-08-21 PROCEDURE — 81001 URINALYSIS AUTO W/SCOPE: CPT

## 2020-08-21 PROCEDURE — 96365 THER/PROPH/DIAG IV INF INIT: CPT

## 2020-08-21 PROCEDURE — 82140 ASSAY OF AMMONIA: CPT

## 2020-08-21 PROCEDURE — 85025 COMPLETE CBC W/AUTO DIFF WBC: CPT

## 2020-08-21 PROCEDURE — 74177 CT ABD & PELVIS W/CONTRAST: CPT

## 2020-08-21 PROCEDURE — 96361 HYDRATE IV INFUSION ADD-ON: CPT

## 2020-08-21 PROCEDURE — 80053 COMPREHEN METABOLIC PANEL: CPT

## 2020-08-21 RX ORDER — 0.9 % SODIUM CHLORIDE 0.9 %
1000 INTRAVENOUS SOLUTION INTRAVENOUS ONCE
Status: COMPLETED | OUTPATIENT
Start: 2020-08-21 | End: 2020-08-21

## 2020-08-21 RX ADMIN — IOPAMIDOL 100 ML: 612 INJECTION, SOLUTION INTRAVENOUS at 21:19

## 2020-08-21 RX ADMIN — SODIUM CHLORIDE 1000 ML: 9 INJECTION, SOLUTION INTRAVENOUS at 21:40

## 2020-08-21 RX ADMIN — SODIUM CHLORIDE 1000 ML: 9 INJECTION, SOLUTION INTRAVENOUS at 19:18

## 2020-08-21 RX ADMIN — PIPERACILLIN AND TAZOBACTAM 3.38 G: 3; .375 INJECTION, POWDER, LYOPHILIZED, FOR SOLUTION INTRAVENOUS at 23:28

## 2020-08-21 NOTE — ED NOTES
Bed: 06  Expected date: 8/21/20  Expected time: 6:44 PM  Means of arrival: [facility]  Comments:  80f An  o 1.   Abn labs from [address] [facility]  08/21/20 1048

## 2020-08-21 NOTE — ED PROVIDER NOTES
[address] [facility] ED  eMERGENCY dEPARTMENT eNCOUnter      Pt Name: Lashanda Seen  MRN: [id number]  Bernardgfchaparrita 1935  Date of evaluation: 8/21/2020  Provider: Lieutenant Anjana MD    [address] [place] [address]       Chief Complaint   Patient presents with    Abnormal Lab     elevated bilirubin         HISTORY OF PRESENT ILLNESS   (Location/Symptom, Timing/Onset,Context/Setting, Quality, Duration, Modifying Factors, Severity)  Note limiting factors. Lashanda Seen is a 80 y.o. female who presents to the emergency department for evaluation of altered mental status and abnormal labs. Patient is a resident of a nursing home where she has a baseline dementia and is pretty much nonambulatory. They have been following a elevating bilirubin over the past week with associated jaundiced appearance. She had outpatient lab that showed a bilirubin over 4. The patient has been a little bit more somnolent according to notes. She presents here alert and oriented to name only. Apparently no new change in medications. She had a HIDA scan scheduled for August 25 as an outpatient. She does not voice any complaints. HPI    NursingNotes were reviewed. REVIEW OF SYSTEMS    (2-9 systems for level 4, 10 or more for level 5)     Review of Systems   All other systems reviewed and are negative. Except as noted above the remainder of the review of systems was reviewed and negative.        PAST MEDICAL HISTORY     Past Medical History:   Diagnosis Date    Anticoagulant long-term use     Arthritis     Atrial fibrillation ([address]     DJD (degenerative joint disease), cervical 1/9/2019    Hypertension     Hypothyroidism     Obesity     Type II or unspecified type diabetes mellitus without mention of complication, not stated as uncontrolled          SURGICALHISTORY       Past Surgical History:   Procedure Laterality Date    FRACTURE SURGERY           CURRENT MEDICATIONS       Previous Medications    ALLOPURINOL (ZYLOPRIM) 100 MG TABLET    TAKE 1 TABLET BY MOUTH DAILY    BLOOD GLUCOSE MONITOR STRIPS    Patient to test 4 times daily. Dx: E11.65. Please dispense the brand that is covered by insurance. BLOOD GLUCOSE MONITORING SUPPL (TRUE METRIX METER) W/DEVICE KIT    Use 4 times a day. BLOOD GLUCOSE MONITORING SUPPL KIT    Patient to test 4 times a day. Dx: E11.65. Please dispense the brand covered by patient's insurance. CHOLECALCIFEROL (VITAMIN D3) 1.25 MG (53922 UT) CAPS    TAKE 1 CAPSULE BY MOUTH ONCE A WEEK    [facility] UNIFINE PENTIPS 31G X 8 MM MISC    USE AS DIRECTED ONCE DAILY    FOLIC ACID (FOLVITE) 1 MG TABLET    Take 1 tablet by mouth daily    FUROSEMIDE (LASIX) 40 MG TABLET    TAKE 1 & 1/2 (ONE AND ONE-HALF) TABLETS BY MOUTH EVERY MONDAY, WEDNESDAY & FRIDAY, and TAKE 1 TABLET EVERY Tuesday, THURSDAY, SATURDay & sun    INSULIN LISPRO (HUMALOG) 100 UNIT/ML INJECTION VIAL    Inject 0-3 Units into the skin nightly    INSULIN LISPRO (HUMALOG) 100 UNIT/ML INJECTION VIAL    Inject 0-6 Units into the skin 3 times daily (with meals)    LANCETS MISC    Patient to test 4 times a day. Dx: E11.65. Please dispense the brand covered by patient's insurance. LEVOTHYROXINE (SYNTHROID) 75 MCG TABLET    TAKE 1 TABLET BY MOUTH DAILY    LOSARTAN (COZAAR) 25 MG TABLET    Take 1 tablet by mouth daily    MAGNESIUM OXIDE (MAG-OX) 400 (241.3 MG) MG TABS TABLET    TAKE 1 TABLET BY MOUTH TWICE DAILY    RIVAROXABAN (XARELTO) 20 MG TABS TABLET    Take 1 tablet by mouth Daily with supper    SKIN PROTECTANTS, MISC. (EUCERIN) CREAM    Apply topically as needed for Dry Skin Apply topically as needed. ALLERGIES     Patient has no known allergies. FAMILY HISTORY       Family History   Problem Relation Age of Onset    Stroke Mother     Cancer Father     Heart Disease Brother     Heart Attack Brother           SOCIAL HISTORY       Social History     Socioeconomic History    Marital status:       Spouse name: None    Number of children: None    Years of education: None    Highest education level: None   Occupational History    None   Social Needs    Financial resource strain: None    Food insecurity     Worry: None     Inability: None    Transportation needs     Medical: None     Non-medical: None   Tobacco Use    Smoking status: Former Smoker    Smokeless tobacco: Never Used   Substance and Sexual Activity    Alcohol use: No    Drug use: No    Sexual activity: Never   Lifestyle    Physical activity     Days per week: None     Minutes per session: None    Stress: None   Relationships    Social connections     Talks on phone: None     Gets together: None     Attends Voodoo service: None     Active member of club or organization: None     Attends meetings of clubs or organizations: None     Relationship status: None    Intimate partner violence     Fear of current or ex partner: None     Emotionally abused: None     Physically abused: None     Forced sexual activity: None   Other Topics Concern    None   Social History Narrative         Lives With: Son    [facility] [address] [place] [address] Access: Stairs to enter with rails    Entrance Stairs - Number of Steps: 4 - Rails: Right    Bathroom Shower/Tub: Walk-in shower    Bathroom Equipment: Grab bars in shower    Home Equipment: Rolling walker    ADL Assistance: Needs assistance (son)    Homemaking Assistance: Needs assistance (son)    Ambulation Assistance: Independent (2ww)    Transfer Assistance: Independent    Active : No    Additional Comments: info provided by son who is primary caregiver                 SCREENINGS    [place] Coma Scale  Eye Opening: Spontaneous  Best Verbal Response: Confused  Best Motor Response: Localizes pain  Feroz Coma Scale Score: 13 @FLOW([id number])@      PHYSICAL EXAM    (up to 7 for level 4, 8 or more for level 5)     ED Triage Vitals [08/21/20 1855]   BP Temp Temp Source Pulse Resp SpO2 Height Weight   110/89 98.1 °F (36.7 °C) Oral 86 16 96 % 5' 1\" (1.549 m) 160 lb (72.6 kg)       Physical Exam  Vitals signs and nursing note reviewed. Constitutional:       Appearance: She is well-developed. Comments: Jaundiced appearance   HENT:      Head: Normocephalic. Nose: Nose normal.   Eyes:      Conjunctiva/sclera: Conjunctivae normal.      Pupils: Pupils are equal, round, and reactive to light. Neck:      Musculoskeletal: Normal range of motion and neck supple. Cardiovascular:      Rate and Rhythm: Normal rate and regular rhythm. Heart sounds: Normal heart sounds. Pulmonary:      Effort: Pulmonary effort is normal.      Breath sounds: Normal breath sounds. Abdominal:      General: Bowel sounds are normal.      Palpations: Abdomen is soft. Tenderness: There is no abdominal tenderness. There is no guarding. Musculoskeletal: Normal range of motion. Skin:     General: Skin is warm and dry. Capillary Refill: Capillary refill takes less than 2 seconds. Neurological:      Mental Status: She is alert. She is disoriented. GCS: GCS eye subscore is 4. GCS verbal subscore is 5. GCS motor subscore is 6. Comments: Patient moves all extremities. She seems to have lower extremity weakness which is chronic. Difficulty following commands. Psychiatric:      Comments: Patient is at her baseline mental status according the notes         DIAGNOSTIC RESULTS     EKG: All EKG's are interpreted by the Emergency Department Physician who either signs or Co-signsthis chart in the absence of a cardiologist.  Atrial fibrillation with controlled rate of 69. No acute ST or T wave changes. Normal axis.   Abnormal EKG    RADIOLOGY:   Non-plain filmimages such as CT, Ultrasound and MRI are read by the radiologist. Plain radiographic images are visualized and preliminarily interpreted by the emergency physician with the below findings:      Interpretation per the Radiologist below, if available at the time ofthis note:    CT ABDOMEN PELVIS W IV CONTRAST Additional Contrast? None    (Results Pending)         ED BEDSIDE ULTRASOUND:   Performed by ED Physician - none    LABS:  Labs Reviewed   COMPREHENSIVE METABOLIC PANEL - Abnormal; Notable for the following components:       Result Value    Sodium 131 (*)     Potassium 3.1 (*)     Chloride 90 (*)     CO2 33 (*)     Anion Gap 8 (*)     Glucose 124 (*)     BUN 42 (*)     CREATININE 1.57 (*)     GFR Non- 31.3 (*)     GFR  37.8 (*)     Total Protein 5.6 (*)     Alb 2.7 (*)     Total Bilirubin 3.2 (*)     Alkaline Phosphatase 597 (*)     ALT 34 (*)      (*)     All other components within normal limits   CBC WITH AUTO DIFFERENTIAL - Abnormal; Notable for the following components:    Hemoglobin 11.8 (*)     Hematocrit 35.4 (*)     RDW 16.2 (*)     Neutrophils Absolute 7.9 (*)     All other components within normal limits   URINE RT REFLEX TO CULTURE - Abnormal; Notable for the following components:    Color, UA DARK YELLOW (*)     Bilirubin Urine SMALL (*)     Leukocyte Esterase, Urine TRACE (*)     All other components within normal limits   MICROSCOPIC URINALYSIS - Abnormal; Notable for the following components:    Yeast, UA Present (*)     RBC, UA 3-5 (*)     All other components within normal limits   AMMONIA       All other labs were within normal range or not returned as of this dictation. EMERGENCY DEPARTMENT COURSE and DIFFERENTIAL DIAGNOSIS/MDM:   Vitals:    Vitals:    08/21/20 2215 08/21/20 2230 08/21/20 2300 08/21/20 2338   BP: 82/64 (!) 95/31 (!) 82/57 (!) 124/92   Pulse: 75 64 68 64   Resp: 21 17 20 18   Temp:       TempSrc:       SpO2:    94%   Weight:       Height:            MDM patient is found to have some significant gallstones and a distended gallbladder on CT. This would explain her elevated bilirubin. Ammonia level was normal.  White blood count is normal.  She is afebrile.     Unrelated there was also a question of a large low density filling defect involving the left atrium suspicious for a thrombus. Recommendation is to consider echocardiogram for further investigation of that. Patient is already on Xarelto    Review of the patient's old records  She still is a full code. It looks like palliative care was involved last time but they were unable to get a hold of her son. Leave based on her dementia with orientation x1 and chronic illnesses. Palliative care should once again be considered. In the interim she is treated with antibiotic therapy and fluids based on what appears to be significant dehydration. CONSULTS:  None    PROCEDURES:  Unless otherwise noted below, none     Procedures    FINAL IMPRESSION      1. Calculus of gallbladder with cholecystitis without biliary obstruction, unspecified cholecystitis acuity    2. Mural thrombus of heart          DISPOSITION/PLAN   DISPOSITION Decision To Admit 08/22/2020 12:26:45 AM      PATIENT REFERRED TO:  No follow-up provider specified.     DISCHARGE MEDICATIONS:  New Prescriptions    No medications on file          (Please note that portions of this note were completed with a voice recognition program.  Efforts were made to edit the dictations but occasionally words are mis-transcribed.)    Christopher Bronson MD (electronically signed)  Attending Emergency Physician          Christopher Bronson MD  08/22/20 8698       Christopher Bronson MD  08/22/20 0028       Christopher Bronson MD  08/27/20 3678

## 2020-08-21 NOTE — ED NOTES
Patient presents to the ER with complaints of abnormal bilirubin  Patient is jaundiced  Baseline demented  Hypotensive  Notified provider of BP  [facility] had a line in the patient, patient pulled out        Marley Fraga RN  08/21/20 3712

## 2020-08-22 ENCOUNTER — APPOINTMENT (OUTPATIENT)
Dept: ULTRASOUND IMAGING | Age: 85
DRG: 445 | End: 2020-08-22
Payer: MEDICARE

## 2020-08-22 PROBLEM — I42.5 RESTRICTIVE CARDIOMYOPATHY (HCC): Status: ACTIVE | Noted: 2020-08-22

## 2020-08-22 PROBLEM — I51.3 MURAL THROMBUS OF HEART: Status: ACTIVE | Noted: 2020-08-22

## 2020-08-22 PROBLEM — I38 VALVULAR HEART DISEASE: Status: ACTIVE | Noted: 2020-08-22

## 2020-08-22 PROBLEM — K81.9 CHOLECYSTITIS: Status: ACTIVE | Noted: 2020-08-22

## 2020-08-22 LAB
ALBUMIN SERPL-MCNC: 2.2 G/DL (ref 3.5–4.6)
ALBUMIN SERPL-MCNC: 2.3 G/DL (ref 3.5–4.6)
ALP BLD-CCNC: 502 U/L (ref 40–130)
ALP BLD-CCNC: 502 U/L (ref 40–130)
ALT SERPL-CCNC: 28 U/L (ref 0–33)
ALT SERPL-CCNC: 29 U/L (ref 0–33)
ANION GAP SERPL CALCULATED.3IONS-SCNC: 13 MEQ/L (ref 9–15)
AST SERPL-CCNC: 88 U/L (ref 0–35)
AST SERPL-CCNC: 96 U/L (ref 0–35)
BILIRUB SERPL-MCNC: 2.6 MG/DL (ref 0.2–0.7)
BILIRUB SERPL-MCNC: 2.6 MG/DL (ref 0.2–0.7)
BILIRUBIN DIRECT: 1.8 MG/DL (ref 0–0.4)
BILIRUBIN, INDIRECT: 0.8 MG/DL (ref 0–0.6)
BUN BLDV-MCNC: 32 MG/DL (ref 8–23)
CALCIUM SERPL-MCNC: 8.1 MG/DL (ref 8.5–9.9)
CHLORIDE BLD-SCNC: 100 MEQ/L (ref 95–107)
CO2: 23 MEQ/L (ref 20–31)
CREAT SERPL-MCNC: 1.04 MG/DL (ref 0.5–0.9)
EKG ATRIAL RATE: 234 BPM
EKG Q-T INTERVAL: 410 MS
EKG QRS DURATION: 82 MS
EKG QTC CALCULATION (BAZETT): 439 MS
EKG R AXIS: 82 DEGREES
EKG T AXIS: 183 DEGREES
EKG VENTRICULAR RATE: 69 BPM
GFR AFRICAN AMERICAN: >60
GFR NON-AFRICAN AMERICAN: 50.3
GLOBULIN: 2.8 G/DL (ref 2.3–3.5)
GLUCOSE BLD-MCNC: 76 MG/DL (ref 60–115)
GLUCOSE BLD-MCNC: 86 MG/DL (ref 60–115)
GLUCOSE BLD-MCNC: 86 MG/DL (ref 60–115)
GLUCOSE BLD-MCNC: 88 MG/DL (ref 70–99)
GLUCOSE BLD-MCNC: 90 MG/DL (ref 60–115)
HBA1C MFR BLD: 6.1 % (ref 4.8–5.9)
LIPASE: 35 U/L (ref 12–95)
LIPASE: 38 U/L (ref 12–95)
MAGNESIUM: 1.6 MG/DL (ref 1.7–2.4)
PERFORMED ON: NORMAL
POTASSIUM REFLEX MAGNESIUM: 3.3 MEQ/L (ref 3.4–4.9)
REASON FOR REJECTION: NORMAL
REJECTED TEST: NORMAL
SARS-COV-2, NAAT: NOT DETECTED
SODIUM BLD-SCNC: 136 MEQ/L (ref 135–144)
TOTAL PROTEIN: 5 G/DL (ref 6.3–8)
TOTAL PROTEIN: 5.3 G/DL (ref 6.3–8)

## 2020-08-22 PROCEDURE — 83690 ASSAY OF LIPASE: CPT

## 2020-08-22 PROCEDURE — 6360000002 HC RX W HCPCS: Performed by: NURSE PRACTITIONER

## 2020-08-22 PROCEDURE — 83036 HEMOGLOBIN GLYCOSYLATED A1C: CPT

## 2020-08-22 PROCEDURE — 6360000002 HC RX W HCPCS: Performed by: INTERNAL MEDICINE

## 2020-08-22 PROCEDURE — 83735 ASSAY OF MAGNESIUM: CPT

## 2020-08-22 PROCEDURE — 99221 1ST HOSP IP/OBS SF/LOW 40: CPT | Performed by: COLON & RECTAL SURGERY

## 2020-08-22 PROCEDURE — 93005 ELECTROCARDIOGRAM TRACING: CPT | Performed by: EMERGENCY MEDICINE

## 2020-08-22 PROCEDURE — 76705 ECHO EXAM OF ABDOMEN: CPT

## 2020-08-22 PROCEDURE — U0002 COVID-19 LAB TEST NON-CDC: HCPCS

## 2020-08-22 PROCEDURE — 2580000003 HC RX 258: Performed by: EMERGENCY MEDICINE

## 2020-08-22 PROCEDURE — 2580000003 HC RX 258: Performed by: NURSE PRACTITIONER

## 2020-08-22 PROCEDURE — 99222 1ST HOSP IP/OBS MODERATE 55: CPT | Performed by: SPECIALIST

## 2020-08-22 PROCEDURE — 1210000000 HC MED SURG R&B

## 2020-08-22 PROCEDURE — 80053 COMPREHEN METABOLIC PANEL: CPT

## 2020-08-22 PROCEDURE — 36415 COLL VENOUS BLD VENIPUNCTURE: CPT

## 2020-08-22 RX ORDER — PROMETHAZINE HYDROCHLORIDE 12.5 MG/1
12.5 TABLET ORAL EVERY 6 HOURS PRN
Status: DISCONTINUED | OUTPATIENT
Start: 2020-08-22 | End: 2020-08-23 | Stop reason: HOSPADM

## 2020-08-22 RX ORDER — DEXTROSE MONOHYDRATE 50 MG/ML
100 INJECTION, SOLUTION INTRAVENOUS PRN
Status: DISCONTINUED | OUTPATIENT
Start: 2020-08-22 | End: 2020-08-23 | Stop reason: HOSPADM

## 2020-08-22 RX ORDER — SODIUM CHLORIDE 0.9 % (FLUSH) 0.9 %
10 SYRINGE (ML) INJECTION PRN
Status: DISCONTINUED | OUTPATIENT
Start: 2020-08-22 | End: 2020-08-23 | Stop reason: HOSPADM

## 2020-08-22 RX ORDER — 0.9 % SODIUM CHLORIDE 0.9 %
1000 INTRAVENOUS SOLUTION INTRAVENOUS ONCE
Status: COMPLETED | OUTPATIENT
Start: 2020-08-22 | End: 2020-08-22

## 2020-08-22 RX ORDER — ACETAMINOPHEN 650 MG/1
650 SUPPOSITORY RECTAL EVERY 6 HOURS PRN
Status: DISCONTINUED | OUTPATIENT
Start: 2020-08-22 | End: 2020-08-23 | Stop reason: HOSPADM

## 2020-08-22 RX ORDER — ONDANSETRON 2 MG/ML
4 INJECTION INTRAMUSCULAR; INTRAVENOUS EVERY 6 HOURS PRN
Status: DISCONTINUED | OUTPATIENT
Start: 2020-08-22 | End: 2020-08-23 | Stop reason: HOSPADM

## 2020-08-22 RX ORDER — DEXTROSE MONOHYDRATE 25 G/50ML
12.5 INJECTION, SOLUTION INTRAVENOUS PRN
Status: DISCONTINUED | OUTPATIENT
Start: 2020-08-22 | End: 2020-08-23 | Stop reason: HOSPADM

## 2020-08-22 RX ORDER — SODIUM CHLORIDE 0.9 % (FLUSH) 0.9 %
10 SYRINGE (ML) INJECTION EVERY 12 HOURS SCHEDULED
Status: DISCONTINUED | OUTPATIENT
Start: 2020-08-22 | End: 2020-08-23 | Stop reason: HOSPADM

## 2020-08-22 RX ORDER — POTASSIUM CHLORIDE 7.45 MG/ML
10 INJECTION INTRAVENOUS
Status: COMPLETED | OUTPATIENT
Start: 2020-08-22 | End: 2020-08-22

## 2020-08-22 RX ORDER — ACETAMINOPHEN 325 MG/1
650 TABLET ORAL EVERY 6 HOURS PRN
Status: DISCONTINUED | OUTPATIENT
Start: 2020-08-22 | End: 2020-08-23 | Stop reason: HOSPADM

## 2020-08-22 RX ORDER — DOCUSATE SODIUM 100 MG/1
100 CAPSULE, LIQUID FILLED ORAL 2 TIMES DAILY
COMMUNITY

## 2020-08-22 RX ORDER — NICOTINE POLACRILEX 4 MG
15 LOZENGE BUCCAL PRN
Status: DISCONTINUED | OUTPATIENT
Start: 2020-08-22 | End: 2020-08-23 | Stop reason: HOSPADM

## 2020-08-22 RX ORDER — POLYETHYLENE GLYCOL 3350 17 G/17G
17 POWDER, FOR SOLUTION ORAL DAILY PRN
Status: DISCONTINUED | OUTPATIENT
Start: 2020-08-22 | End: 2020-08-23 | Stop reason: HOSPADM

## 2020-08-22 RX ORDER — POTASSIUM CHLORIDE 7.45 MG/ML
10 INJECTION INTRAVENOUS
Status: DISPENSED | OUTPATIENT
Start: 2020-08-22 | End: 2020-08-22

## 2020-08-22 RX ORDER — SODIUM CHLORIDE 9 MG/ML
INJECTION, SOLUTION INTRAVENOUS CONTINUOUS
Status: DISCONTINUED | OUTPATIENT
Start: 2020-08-22 | End: 2020-08-23 | Stop reason: HOSPADM

## 2020-08-22 RX ADMIN — POTASSIUM CHLORIDE 10 MEQ: 7.46 INJECTION, SOLUTION INTRAVENOUS at 16:30

## 2020-08-22 RX ADMIN — SODIUM CHLORIDE 1000 ML: 9 INJECTION, SOLUTION INTRAVENOUS at 00:30

## 2020-08-22 RX ADMIN — POTASSIUM CHLORIDE 10 MEQ: 7.46 INJECTION, SOLUTION INTRAVENOUS at 14:03

## 2020-08-22 RX ADMIN — POTASSIUM CHLORIDE 10 MEQ: 7.46 INJECTION, SOLUTION INTRAVENOUS at 06:00

## 2020-08-22 RX ADMIN — POTASSIUM CHLORIDE 10 MEQ: 7.46 INJECTION, SOLUTION INTRAVENOUS at 07:27

## 2020-08-22 RX ADMIN — POTASSIUM CHLORIDE 10 MEQ: 7.46 INJECTION, SOLUTION INTRAVENOUS at 17:40

## 2020-08-22 RX ADMIN — SODIUM CHLORIDE: 9 INJECTION, SOLUTION INTRAVENOUS at 03:39

## 2020-08-22 RX ADMIN — ENOXAPARIN SODIUM 30 MG: 30 INJECTION SUBCUTANEOUS at 21:43

## 2020-08-22 RX ADMIN — SODIUM CHLORIDE: 9 INJECTION, SOLUTION INTRAVENOUS at 21:42

## 2020-08-22 RX ADMIN — PIPERACILLIN AND TAZOBACTAM 3.38 G: 3; .375 INJECTION, POWDER, LYOPHILIZED, FOR SOLUTION INTRAVENOUS at 10:13

## 2020-08-22 RX ADMIN — PIPERACILLIN AND TAZOBACTAM 3.38 G: 3; .375 INJECTION, POWDER, LYOPHILIZED, FOR SOLUTION INTRAVENOUS at 21:42

## 2020-08-22 RX ADMIN — POTASSIUM CHLORIDE 10 MEQ: 7.46 INJECTION, SOLUTION INTRAVENOUS at 18:52

## 2020-08-22 NOTE — CONSULTS
Department of General Surgery - Adult  Surgical Service general surgery  Attending Consult Note      Reason for Consult: Acute cholecystitis      CHIEF COMPLAINT: Abnormal ultrasound    History Obtained From:  patient, electronic medical record    HISTORY OF PRESENT ILLNESS:                The patient is a 80 y.o. female who presents with abnormal ultrasound with elevated liver enzymes. She was seen to have an elevated bilirubin which was being worked up with a HIDA scan as an outpatient. Patient has underlying dementia and is currently on Xarelto for atrial fibrillation. She had an ultrasound which showed thickened gallbladder wall and gallstones present. Patient has no historical ability to tell any clinical story to me.     Past Medical History:        Diagnosis Date    Anticoagulant long-term use     Arthritis     Atrial fibrillation (HCC)     DJD (degenerative joint disease), cervical 1/9/2019    Hypertension     Hypothyroidism     Obesity     Type II or unspecified type diabetes mellitus without mention of complication, not stated as uncontrolled      Past Surgical History:        Procedure Laterality Date    FRACTURE SURGERY       Current Medications:   Current Facility-Administered Medications: sodium chloride flush 0.9 % injection 10 mL, 10 mL, Intravenous, 2 times per day  sodium chloride flush 0.9 % injection 10 mL, 10 mL, Intravenous, PRN  acetaminophen (TYLENOL) tablet 650 mg, 650 mg, Oral, Q6H PRN **OR** acetaminophen (TYLENOL) suppository 650 mg, 650 mg, Rectal, Q6H PRN  polyethylene glycol (GLYCOLAX) packet 17 g, 17 g, Oral, Daily PRN  promethazine (PHENERGAN) tablet 12.5 mg, 12.5 mg, Oral, Q6H PRN **OR** ondansetron (ZOFRAN) injection 4 mg, 4 mg, Intravenous, Q6H PRN  enoxaparin (LOVENOX) injection 30 mg, 30 mg, Subcutaneous, Daily  0.9 % sodium chloride infusion, , Intravenous, Continuous  glucose (GLUTOSE) 40 % oral gel 15 g, 15 g, Oral, PRN  dextrose 50 % IV solution, 12.5 g, Intravenous, PRN  glucagon (rDNA) injection 1 mg, 1 mg, Intramuscular, PRN  dextrose 5 % solution, 100 mL/hr, Intravenous, PRN  insulin lispro (HUMALOG) injection vial 0-12 Units, 0-12 Units, Subcutaneous, TID WC  insulin lispro (HUMALOG) injection vial 0-6 Units, 0-6 Units, Subcutaneous, Nightly  piperacillin-tazobactam (ZOSYN) 3.375 g in dextrose 5 % 50 mL IVPB extended infusion (mini-bag), 3.375 g, Intravenous, Q8H  potassium chloride 10 mEq/100 mL IVPB (Peripheral Line), 10 mEq, Intravenous, Q1H  potassium chloride 10 mEq/100 mL IVPB (Peripheral Line), 10 mEq, Intravenous, Q1H  Allergies:  Patient has no known allergies. Social History:   TOBACCO:   reports that she has quit smoking. She has never used smokeless tobacco.  ETOH:   reports no history of alcohol use. DRUGS:   reports no history of drug use. Family History:       Problem Relation Age of Onset    Stroke Mother     Cancer Father     Heart Disease Brother     Heart Attack Brother        REVIEW OF SYSTEMS:    Review of systems not obtained due to patient factors - mental status    PHYSICAL EXAM:    VITALS:  BP (!) 84/52   Pulse (!) 36   Temp 97.2 °F (36.2 °C) (Oral)   Resp 18   Ht 5' 1\" (1.549 m)   Wt 160 lb (72.6 kg)   SpO2 96%   BMI 30.23 kg/m²   CONSTITUTIONAL: Awake  EYES: Eyes track  NECK: Neck soft  LUNGS:  No increased work of breathing, good air exchange, clear to auscultation bilaterally, no crackles or wheezing  CARDIOVASCULAR:  Normal apical impulse, regular rate and rhythm, normal S1 and S2, no S3 or S4, and no murmur noted  ABDOMEN: Abdomen soft nondistended  MUSCULOSKELETAL:  There is no redness, warmth, or swelling of the joints. Full range of motion noted. Motor strength is 5 out of 5 all extremities bilaterally.   Tone is normal.  NEUROLOGIC: Awake  SKIN: Skin mildly icteric  DATA:    CBC:   Lab Results   Component Value Date    WBC 10.1 08/21/2020    RBC 4.22 08/21/2020    RBC 4.16 09/30/2011    HGB 11.8 08/21/2020 HCT 35.4 08/21/2020    MCV 83.9 08/21/2020    MCH 28.0 08/21/2020    MCHC 33.3 08/21/2020    RDW 16.2 08/21/2020     08/21/2020    MPV 8.6 11/12/2014     CMP:    Lab Results   Component Value Date     08/22/2020    K 3.3 08/22/2020     08/22/2020    CO2 23 08/22/2020    BUN 32 08/22/2020    CREATININE 1.04 08/22/2020    GFRAA >60.0 08/22/2020    LABGLOM 50.3 08/22/2020    GLUCOSE 88 08/22/2020    GLUCOSE 153 09/30/2011    PROT 5.0 08/22/2020    LABALBU 2.2 08/22/2020    LABALBU 4.1 09/30/2011    CALCIUM 8.1 08/22/2020    BILITOT 2.6 08/22/2020    ALKPHOS 502 08/22/2020    AST 96 08/22/2020    ALT 29 08/22/2020     Radiology Review: Ultrasound as described above    IMPRESSION/RECOMMENDATIONS:      Acute cholecystitis. Underlying cirrhosis could aggravate the situation. Patient not a surgical candidate. No benefit from operative cholecystectomy in her clinical situation. I would recommend a continued nonoperative treatment with oral antibiotic. Please call if any additional questions.

## 2020-08-22 NOTE — PROGRESS NOTES
Physician Progress Note      [id number]  :                       1935  ADMIT DATE:       2020 6:55 PM  [address] [place] DATE:  RESPONDING  PROVIDER #:        King Mccullough MD          QUERY TEXT:    Patient admitted with acute cholecystitis, noted to have h/o atrial   fibrillation. admission EGD afib, on Xarelto at home If possible, please   document in progress notes and discharge summary further specificity regarding   the type of atrial fibrillation: The medical record reflects the following:  Risk Factors:  hypothyroidism, DMT2, HTN, DJD  Clinical Indicators: admission EKG  afib HR 69   EKG afib with HR 79   EKG afib   EKG afib with RVR  Treatment: Lovenox, Xarelto-home med, pt is NPO, EKG    Thank you, Rancho Sutherland RN BSN CDS  [phone]    Chronic: nonspecific term that could be referring to paroxysmal, persistent,   or permanent  Longstanding persistent: persistent and continuous, lasting > 1 year. Paroxysmal - self-terminating or intermittent; resolves with or without   intervention within 7 days of onset; may recur with various frequency. Persistent - Fails to terminate within 7 days; Often requires meds or   cardioversion to restore to NSR. Permanent - longstanding & persistent; Medication has been ineffective in   restoring NSR &/or cardioversion is contraindicated    Definitions per MS-DRG Training Guide and Quick Reference Guide, Christophe Rizomar 112 5   Diseases and Disorders of the Circulatory System; 2019; [employer]. Software content   from the [employer]?  Advanced CDI Transformation Program  Options provided:  -- Paroxysmal Atrial Fibrillation  -- Longstanding Persistent Atrial Fibrillation  -- Permanent Atrial Fibrillation  -- Persistent Atrial Fibrillation  -- Chronic Atrial Fibrillation, unspecified  -- Other - I will add my own diagnosis  -- Disagree - Not applicable / Not valid  -- Disagree - Clinically unable to determine / Unknown  -- Refer to Clinical Documentation Reviewer    PROVIDER RESPONSE TEXT:    This patient has permanent atrial fibrillation.     Query created by: Aurelia Gupta on 8/22/2020 8:04 AM      Electronically signed by:  Larry Dickerson MD 8/22/2020 8:07 AM

## 2020-08-22 NOTE — CARE COORDINATION
PER CHART PATIENT FROM [facility]. PT HAS HISTORY OF DEMENTIA AND IS CURRENTLY 1:1. [facility] IS ON CONSULT AND COULD ASSIST WITH ACP DOCUMENTATION. PT WILL NEED F/U WITH .

## 2020-08-22 NOTE — CONSULTS
Consults    Patient Name: Kellie Glaser Date: 2020  6:55 PM  MR #: [id number]  : 1935    Attending Physician: Demian Og MD  Reason for consult: Abnormal LFT    History of Presenting Illness:      Bradford Aceves is a 80 y.o. female on hospital day 0 with a history of abnormal LFT.,  Patient was admitted from the nursing home, she has a history of dementia and not able to provide any history, history was obtained from reviewing the chart, patient was found to have gallstones and radiographic evidence of cirrhosis, serum bilirubin is improving, she has not reported any significant abdominal pain nausea or vomiting she has been afebrile, been on Xarelto because of atrial fibrillation. Xiang Geiger History Obtained From:  patient, electronic medical record      History:      Past Medical History:   Diagnosis Date    Anticoagulant long-term use     Arthritis     Atrial fibrillation ([facility] [address]     DJD (degenerative joint disease), cervical 2019    Hypertension     Hypothyroidism     Obesity     Type II or unspecified type diabetes mellitus without mention of complication, not stated as uncontrolled      Past Surgical History:   Procedure Laterality Date    FRACTURE SURGERY         Family History  Family History   Problem Relation Age of Onset    Stroke Mother     Cancer Father     Heart Disease Brother     Heart Attack Brother      [] Unable to obtain due to ventilated and/ or neurologic status    Social History     Socioeconomic History    Marital status:       Spouse name: Not on file    Number of children: Not on file    Years of education: Not on file    Highest education level: Not on file   Occupational History    Not on file   Social Needs    Financial resource strain: Not on file    Food insecurity     Worry: Not on file     Inability: Not on file    Transportation needs     Medical: Not on file     Non-medical: Not on file   Tobacco Use    Smoking status: Former Smoker    Smokeless tobacco: Never Used   Substance and Sexual Activity    Alcohol use: No    Drug use: No    Sexual activity: Never   Lifestyle    Physical activity     Days per week: Not on file     Minutes per session: Not on file    Stress: Not on file   Relationships    Social connections     Talks on phone: Not on file     Gets together: Not on file     Attends [employer] service: Not on file     Active member of club or organization: Not on file     Attends meetings of clubs or organizations: Not on file     Relationship status: Not on file    Intimate partner violence     Fear of current or ex partner: Not on file     Emotionally abused: Not on file     Physically abused: Not on file     Forced sexual activity: Not on file   Other Topics Concern    Not on file   Social History Narrative         Lives With: Son    Type of Home: House- [facility] in [address] [place] [address] Access: Stairs to enter with rails    Entrance Stairs - Number of Steps: 4 - Rails: Right    Bathroom Shower/Tub: Walk-in shower    Bathroom Equipment: Grab bars in shower    Home Equipment: Rolling walker    ADL Assistance: Needs assistance (son)    Homemaking Assistance: Needs assistance (son)    Ambulation Assistance: Independent (2ww)    Transfer Assistance: Independent    Active : No    Additional Comments: info provided by son who is primary caregiver                [x] Unable to obtain due to ventilated and/ or neurologic status      Home Medications:      Medications Prior to Admission: docusate sodium (COLACE) 100 MG capsule, Take 100 mg by mouth 2 times daily  levothyroxine (SYNTHROID) 75 MCG tablet, TAKE 1 TABLET BY MOUTH DAILY  Cholecalciferol (VITAMIN D3) 1.25 MG (95966 UT) CAPS, TAKE 1 CAPSULE BY MOUTH ONCE A WEEK  losartan (COZAAR) 25 MG tablet, Take 1 tablet by mouth daily (Patient taking differently: Take 50 mg by mouth daily )  magnesium oxide (MAG-OX) 400 (241.3 Mg) MG TABS tablet, TAKE 1 TABLET BY MOUTH TWICE Allergies:     No Known Allergies     Review of Systems:       [] CV, Resp, Neuro, , and all other systems reviewed and negative other than listed in HPI.      [x] Unable to obtain due to ventilated and/ or neurologic status      Objective Findings:     Vitals:   Vitals:    08/22/20 0256 08/22/20 0606 08/22/20 0738 08/22/20 0739   BP: (!) 89/42 (!) 80/50 (!) 84/52    Pulse: 74 61 (!) 36    Resp: 17  18    Temp: 97.2 °F (36.2 °C)  97.2 °F (36.2 °C)    TempSrc: Oral  Oral    SpO2: 100%  (!) 52% 96%   Weight:       Height:            Physical Examination:  General: In no acute distress  HEENT: Normocephalic,  scleral icterus. Not appreciable and dry oral mucosa  Neck: No jugular venous distention. No spider angiomas seen  Heart: Regular, no murmur, no rub/gallop. No right ventricular heave. Lungs: Clear to ascultation, no rales/wheezing/rhonchi. Good chest wall excursion. Abdomen: Distension none, obese soft, tenderness none, Scars , Bowel sounds hypoactive  Extremities: No clubbing/cyanosis, no edema. Skin: Warm, dry, normal turgor, no rash, no bruise, no petichiae. Neuro: No myoclonus or tremor.    Psych: Normal affect    Results/ Medications reviewed 8/22/2020, 2:23 PM     Laboratory, Microbiology, Pathology, Radiology, Cardiology, Medications and Transcriptions reviewed  Scheduled Meds:   sodium chloride flush  10 mL Intravenous 2 times per day    enoxaparin  30 mg Subcutaneous Daily    insulin lispro  0-12 Units Subcutaneous TID     insulin lispro  0-6 Units Subcutaneous Nightly    piperacillin-tazobactam  3.375 g Intravenous Q8H    potassium chloride  10 mEq Intravenous Q1H     Continuous Infusions:   sodium chloride 125 mL/hr at 08/22/20 0339    dextrose         Recent Labs     08/21/20 1915   WBC 10.1   HGB 11.8*   HCT 35.4*   MCV 83.9        Recent Labs     08/21/20 1915 08/22/20  0731   * 136   K 3.1* 3.3*   CL 90* 100   CO2 33* 23   BUN 42* 32*   CREATININE 1.57* 1.04* Recent Labs     08/21/20 1915 08/22/20  0731   * 96*   ALT 34* 29   BILITOT 3.2* 2.6*   ALKPHOS 597* 502*     Recent Labs     08/21/20 1915 08/22/20  0731   LIPASE 38 35     Recent Labs     08/21/20 1915 08/22/20  0731   PROT 5.6* 5.0*     Ct Head Wo Contrast    Result Date: 8/9/2020  EXAMINATION: CT of the brain without contrast HISTORY: 24 hours after fall. Intracranial hemorrhage. COMPARISON: CT brain from August 8, 2020ims a aspect of the brain indicate ligament sprain and/or Seng TECHNIQUE: Multiple contiguous axial images were obtained of the brain from the skull base through the vertex. Multiplanar reformats were obtained. FINDINGS: Prominence of the sulci and ventricles compatible with moderate generalized parenchymal volume loss. Gray-white matter differentiation is preserved. Areas of bilateral supratentorial white matter hypoattenuation are nonspecific but most likely related to  chronic small vessel ischemic changes in a patient of this age. No acute hemorrhage or abnormal extra-axial fluid collection. No mass effect or midline shift. The visualized paranasal sinuses and mastoid air cells are clear. Calvarium is intact. No acute intracranial process or significant interval change. All CT scans at this facility use dose modulation, iterative reconstruction, and/or weight based dosing when appropriate to reduce radiation dose to as low as reasonably achievable. Ct Head Wo Contrast    Result Date: 8/8/2020  CT HEAD WO CONTRAST CLINICAL HISTORY: Multiple falls in past few days COMPARISON: January 8, 2019 TECHNIQUE: Multiple unenhanced serial axial images of the brain from the vertex of the skull to the base of the skull were performed. FINDINGS: The ventricles are dilated. Unchanged size configuration. No mass. No midline shift. The cisterns are patent. There are white matter and periventricular changes most likely consistent with chronic small vessel disease.  No acute intra-axial or extra-axial findings. The visualized osseous structures are unremarkable. The visualized portion of the paranasal sinuses, and mastoids are unremarkable. Both globes are intact. No gross preseptal or post septal findings. NO ACUTE INTRA-AXIAL OR EXTRA-AXIAL FINDINGS. All CT scans at this facility use dose modulation, iterative reconstruction, and/or weight based dosing when appropriate to reduce radiation dose to as low as reasonably achievable. Ct Cervical Spine Wo Contrast    Result Date: 8/8/2020  CT CERVICAL SPINE WO CONTRAST CLINICAL HISTORY: Multiple falls COMPARISON: January 8, 2019 Findings: Multiple serial axial images of the cervical spine from the base of the skull through the upper thoracic vertebra with both sagittal and coronal reconstructions was performed. Exam is limited by motion artifact. There is straightening of the normal expected cervical lordosis. There is multilevel degenerative joint disease. Prevertebral  soft tissues are  unremarkable. The disk spaces are diffusely narrowed. There is a minimal retrolisthesis of C5 and C6. May be due to posterior facet arthropathy. No acute fractures. There appears to be high density material within the puriform sinuses. This patient taking any over-the-counter medication or undergone recent radiological swallowing study. Correlate with patient history. .     NO ACUTE FRACTURES. OTHER FINDINGS DETAILED ABOVE All CT scans at this facility use dose modulation, iterative reconstruction, and/or weight based dosing when appropriate to reduce radiation dose to as low as reasonably achievable. Ct Thoracic Spine Wo Contrast    Result Date: 8/8/2020  EXAMINATION:  CT SCAN THORACIC SPINE CLINICAL HISTORY:  Multiple falls COMPARISON:  None TECHNIQUE:  Multiple serial axial images of the thoracic spine from the lower cervical through the upper lumbar vertebra with both sagittal coronal reconstruction was performed.  FINDINGS:  There is a moderate dorsal kyphosis. There is a diffuse generalized osteopenia. There is multilevel degenerative changes. The disc spaces are intact. No significant spondylolisthesis. There is some loss of height of the T5 vertebra. May represent age-indeterminate compression fracture. No other fracture or focal bony abnormalities. SOME LOSS OF HEIGHT OF THE T5 VERTEBRA. MAY REPRESENT AGE-INDETERMINATE COMPRESSION FRACTURE. NO OTHER FRACTURE All CT scans at this facility use dose modulation, iterative reconstruction, and/or weight based dosing when appropriate to reduce radiation dose to as low as reasonably achievable. Ct Lumbar Spine Wo Contrast    Result Date: 8/8/2020  EXAMINATION:  CT SCAN LUMBAR SPINE CLINICAL HISTORY:  Multiple falls COMPARISON:  None TECHNIQUE:  Multiple serial axial images from the T11 through the sacral levels with both sagittal coronal reconstruction was performed. FINDINGS:  There are 5 lumbar type vertebrae. There is diffuse generalized osteopenia. There is multilevel degenerative changes with bridging osteophytes. There is a minimal grade 1 anterolisthesis of L3-4. Disc spaces are diffusely narrowed. The SI joints are intact. No acute fractures. Within the field-of-view there is both a cystic area of the left adrenal as well is a solid component measuring 2.0 cm. The cystic component measures 1.8 cm. There is a partially exophytic cystic area at the interpole region of the right kidney. It measures 1.6 cm.     1. NO ACUTE FRACTURES. 2. SOLID CYSTIC LEFT ADRENAL MASS AS DESCRIBED ABOVE. DOES NOT QUALIFY AS A SIMPLE ADENOMA. THEREFORE FOLLOW-UP AND FURTHER EVALUATION WOULD BE WARRANTED. 2. AREA OF PARTIALLY EXOPHYTIC LOW-ATTENUATION IN THE RIGHT KIDNEY. LIKELY RENAL CYST BUT NOT FULLY CHARACTERIZED IN THIS NONCONTRAST STUDY.  CORRELATE CLINICALLY AND FOLLOW-UP All CT scans at this facility use dose modulation, iterative reconstruction, and/or weight based dosing when appropriate to reduce radiation dose to as low as reasonably achievable. Mri Cervical Spine Wo Contrast    Result Date: 8/10/2020  MRI of the cervical spine HISTORY: Frequent falls. COMPARISON: 8/8/2020 cervical spine CT. TECHNIQUE: Sagittal T1, T2, and inversion recovery. Axial gradient echo and T2. FINDINGS: Axial gradient echo sequence is nondiagnostic due to patient motion. There is misregistration between the sagittal and all axial images due to motion markedly limiting evaluation of the degree of canal and foraminal stenosis with all these assessments made on sagittal images. Patient is obliquely oriented in the scan plane. T5 mild approximately 20% reduction in the anterior height without edema likely representing remote compression or on a congenital basis. Remaining visualized vertebral body heights are maintained with no sign of acute compression. No epidural hematoma or significant prevertebral soft tissue swelling. Craniocervical junction mild to moderate degenerative changes odontoid articulation with the anterior arch of C1. No significant posterior ligamentous thickening to create craniocervical narrowing. No Chiari malformation. C2-3 (C2): Mild disc space narrowing and posterior bulging. Mild canal stenosis based on the sagittal images accentuated by ligamentous hypertrophy. Minimal foraminal narrowing. C3-4 (C3): Severe disc space narrowing. Mild anterolisthesis of C3 relative to 4, approximately 2.4 mm depending on site of cursor placement. Anterolisthesis is due to disc space narrowing and facet joint arthrosis. Mild to moderate narrowing of the cervical canal based on the sagittal images with cerebrospinal fluid signal posterior to the cord. Severe left foraminal stenosis due to disc space height loss and uncovertebral joint arthrosis. Foraminal narrowing on the right appears mild although evaluation is limited as all the assessments are made on the sagittal images. C4-5 (C4): Severe disc space narrowing and desiccation.  Prominent anterior syndesmophyte. Mild posterior endplate spurring. Along with posterior ligamentous thickening this accounts for at least mild canal stenosis. Severe left foraminal stenosis due to disc space narrowing and uncovertebral joint arthrosis. Milder degree of foraminal narrowing on the right likely at least moderate. C5-6 (C5): Moderate to severe disc space narrowing more pronounced on the left. Anterior endplate spurs. Posteriorly there is diffuse disc bulging and minimal endplate spurring. Minimal spinal canal narrowing based on the sagittal images. Severe left greater than right foraminal stenosis due to uncovertebral joint arthrosis and disc space height loss. C6-7 (C6): Mild disc space narrowing and desiccation. Very mild anterior and posterior bulging with very little mass effect on the thecal sac. Minimal ligamentous hypertrophy. Mild left foraminal narrowing due to uncovertebral joint arthrosis and disc space narrowing. Minimal narrowing on the right. C7-T1 (C7): Disc space height relatively well maintained. No disc-related anterior extradural defect, canal or significant foraminal stenosis. T1-2 through the T5-6 disc levels are partly included on exam. No canal or foraminal narrowing at T1-2. There does not appear to be significant disc related anterior extradural defects at the remaining levels however due to patient obliquity and movement  the full course of the spinal canal and neural foramina are not visualized. There is no significant left foraminal narrowing. The right foramina are not included on the exam     Study markedly limited by motion with axial images essentially nondiagnostic. Cervical spondylitic changes most pronounced at the C3-4 through the C5-6 level with  foraminal narrowing due to disc space height loss and uncovertebral joint arthrosis. Canal narrowing due to disc and endplate spurring as well as ligamentous hypertrophy most pronounced at C3-4 and C4-5.   No cord compression or myelomalacia. T5 remote or congenital mild anterior wedging. Ct Abdomen Pelvis W Iv Contrast Additional Contrast? None    Result Date: 8/22/2020  EXAM:  CT ABDOMEN PELVIS W IV CONTRAST History: Abdominal pain. Elevated bilirubin. Technique: Multiple contiguous axial images were obtained of the abdomen and pelvis from an level of the lung bases through the ischial tuberosities with contrast. Multiplanar reformats were obtained. Delayed images were obtained. Comparison: CT abdomen pelvis from July 7, 2011 Findings: Small bilateral pleural effusions. Minimal bibasilar atelectasis. Large hypodensity within the left atrium. Evidence of coronary artery disease. Nodular contour of the liver. No intrahepatic biliary dilatation. The gallbladder is distended and demonstrates wall thickening and mild pericholecystic edema. A few small subtle hyperdensities are identified within the gallbladder and are likely gallstones. Small area of hypodensity within the superior medial aspect of the spleen is concerning for splenic infarction. The stomach, pancreas, and right adrenal gland are within normal limits. 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 A 2.4 cm indeterminate left adrenal nodule is identified. The kidneys enhance uniformly. 2 cm right renal cysts noted. No urinary tract calculi or hydronephrosis. Urinary bladder is well distended. The uterus is present. The endometrium is at the upper limits of normal measuring approximately 8 mm in AP dimension. Abdominal aorta is nonaneurysmal  and demonstrates atherosclerotic calcification . No retroperitoneal or abdominal/pelvic lymphadenopathy. No small bowel obstruction. No overt colonic mass or pericolonic inflammation. Appendix is nonvisualized. No free fluid or free air. Degenerative changes of the spine. No acute osseous abnormality. Hypodense filling defect within the left atrium is concerning for thrombus. Echocardiography recommended to further evaluate. Gallbladder distention, gallbladder wall thickening, mild pericholecystic edema, and cholelithiasis concerning for acute cholecystitis. Small splenic infarction. Cirrhosis. 2.4 cm indeterminate left adrenal nodule would be better assessed with dedicated CT or MRI with contrast adrenal mass protocol. The endometrium appears at the upper limits of normal in thickness. Pelvic ultrasound is recommended to further evaluate. All CT scans at this facility use dose modulation, iterative reconstruction, and/or weight based dosing when appropriate to reduce radiation dose to as low as reasonably achievable. Xr Chest Portable    Result Date: 2020  Patient MRN: [id number] : 1935 Age:  80 years Gender: Female Order Date: 2020 3:20 PM. Exam: XR CHEST PORTABLE Number of Views: 1 Indication:  Altered mental status/fall Comparison: 2019 Findings: Stable, enlarged cardiomediastinal silhouette with underlying central pulmonary vascular congestion, right hemidiaphragmatic elevation and thoracic aortic vascular calcifications. Airspace opacification right medial inferior lung base. Impression:  1. Stable, enlarged cardiomediastinal silhouette with underlying central pulmonary vascular congestion right hemidiaphragmatic elevation. 2. Vascular calcifications thoracic aorta. 3. Nonspecific airspace disease medial inferior right lung base could be reflective of infection/infiltrate/pneumonia and/or atelectasis/scarring. 4. Please note this study does not exclude the possibility of underlying CoVid-19 viral infection and/or underlying pulmonary involvement. Us Abdomen Limited    Result Date: 2020  EXAM: US ABDOMEN LIMITED HISTORY: Cholelithiasis. TECHNIQUE: Ultrasound examination was performed of the right upper quadrant of the abdomen.  COMPARISON: CT abdomen pelvis from 2020 FINDINGS: The gallbladder is mildly distended and contains gallbladder sludge and a few small gallstones. There is gallbladder wall thickening. The gallbladder wall thickness measures 4 mm. Pericholecystic fluid is identified. The common bile duct is normal in diameter measuring 4 mm. There is subtle nodularity of the contour of the liver. No intrapelvic biliary dilatation. No focal lesion of the liver. Findings compatible with acute cholecystitis. Cirrhosis. Ct Maxillofacial Wo Contrast    Result Date: 8/8/2020  UNENHANCED CT SCAN OF THE MAXILLOFACIAL REGION. CLINICAL HISTORY: Multiple falls. Generalized pain. COMPARISON: None TECHNIQUE: Multiple unenhanced serial axial images of the of the maxillofacial region with both sagittal and coronal reconstructions was performed. FINDINGS: The visualized basal brain shows no acute intra-axial or extra-axial findings. There are white matter changes most likely consistent with chronic small vessel ischemic disease. Both globes are intact. No gross preseptal or post septal findings. The visualized paranasal sinuses, frontal, ethmoid, sphenoid, maxillary sinuses are well aerated. No mucoperiosteal thickening, retention cyst and/or polyps or air fluid levels are noted. Both globes are intact. No significant preseptal or post septal findings. There are no acute fractures or focal bony abnormalities. There is some soft tissue changes in the right premaxillary region. Correlate with patient history and exam There are scattered dental caries and periapical lucencies. NO ACUTE FRACTURES. DENTAL DISEASE IS NOTED. FOLLOW-UP All CT scans at this facility use dose modulation, iterative reconstruction, and/or weight based dosing when appropriate to reduce radiation dose to as low as reasonably achievable. Mri Brain Wo Contrast    Result Date: 8/10/2020  MRI of the brain without contrast. HISTORY: Multiple falls in the past several days. Found by emergency medical services on the floor. Confusion. Concern for infarct.  COMPARISON: 8/9/2020 noncontrast CT the brain. TECHNIQUE: Due to patient motion fast scanning techniques are performed including Sagittal and axial spoiled gradient. Axial FLAIR, fast spin echo T2, gradient echo, and diffusion-weighted images. Coronal fast spin-echo T2 FINDINGS: No restricted diffusion to indicate acute infarct. No mass effect, extra-axial collections. No sign of hemorrhage on the gradient echo sequences. Moderate to marked diffuse cerebral atrophy. Confluent moderate bilateral periventricular white matter long TR sequence hyperintensities likely chronic small vessel ischemic versus degenerative or less likely primary demyelinating disease. Orbits and cerebellopontine angles are grossly unremarkable for the noncontrast technique. Nonspecific mild empty sella which may be seen in normals but has been described with various clinical symptoms (e.g. headaches),idiopathic intracranial hypertension and/or pituitary-related hormone abnormalities. Opacification of a small portion of the very inferior left mastoid air cell. Minimal mucosal thickening in the ethmoid sinuses. Mastoid opacification can be an incidental finding in asymptomatic patients. Mastoiditis only in the appropriate clinical setting. Cervical spondylosis partly included on the examination with likely significant canal and foraminal stenosis. Study limited by motion. Atrophy and chronic white matter changes. No acute infarct. Opacification of a small number of inferior left mastoid air cells and partial empty sella as detailed above. Cervical spondylosis. Us Retroperitoneal Limited    Result Date: 8/10/2020  COMPARISON: CT of the lumbar spine, August 8, 2020 HISTORY:  SOLID CYSTIC LEFT ADRENAL MASS COMMENTS: N39.0 UTI (urinary tract infection) ICD10 TECHNIQUE: US RETROPERITONEAL LIMITED FINDINGS: The right kidney measures 12.3 x 4.2 x 5 cm. The left kidney measures 11.4 x 4.2 x 4.5 cm. The corticomedullary junctions are preserved.  A cyst is seen and is periportal the right kidney that measures 1.9 x 1.9 x 1.6 cm. No renal calculi, perinephric fluid or hydronephrosis is seen. The adrenal gland is not visualized. The left adrenal gland is not clearly identified. A cystic structure is seen on this examination which appears to be in the upper pole of the left kidney. Consider MRI for better visualization of the adrenal mass. Impression:   55-year-old female admitted with abnormal liver enzymes and CT and gallbladder ultrasound shows gallstones and nodular liver consistent with cirrhosis, ultrasound showed no ductal dilation but CAT scan did not comment on the size of the bile duct. Serum bilirubin and AST is improving, patient is on IV antibiotics. Liver appears cirrhotic on the CAT scan , patient is low albumin and normal platelets. Rule out CBD stone causing abnormal LFT versus cholecystitis. Plan: Will order MRCP, also check pro time. Continue IV antibiotics. Comments: Thank you for allowing us to participate in the care of this patient. Will continue to follow. Please call if questions or concerns arise.     Electronically signed by Jerica Garay MD on 8/22/2020 at 2:23 PM

## 2020-08-22 NOTE — H&P
Lifestyle    Physical activity     Days per week: Not on file     Minutes per session: Not on file    Stress: Not on file   Relationships    Social connections     Talks on phone: Not on file     Gets together: Not on file     Attends Tenriism service: Not on file     Active member of club or organization: Not on file     Attends meetings of clubs or organizations: Not on file     Relationship status: Not on file    Intimate partner violence     Fear of current or ex partner: Not on file     Emotionally abused: Not on file     Physically abused: Not on file     Forced sexual activity: Not on file   Other Topics Concern    Not on file   Social History Narrative         Lives With: Son    Type of Home: House- [address] in [address] [place] [address] Access: Stairs to enter with rails    Entrance Stairs - Number of Steps: 4 - Rails: Right    Bathroom Shower/Tub: Walk-in shower    Bathroom Equipment: Grab bars in shower    Home Equipment: Rolling walker    ADL Assistance: Needs assistance (son)    Homemaking Assistance: Needs assistance (son)    Ambulation Assistance: Independent (2ww)    Transfer Assistance: Independent    Active : No    Additional Comments: info provided by son who is primary caregiver               MEDICATIONS:   Prior to Admission medications    Medication Sig Start Date End Date Taking?  Authorizing Provider   folic acid (FOLVITE) 1 MG tablet Take 1 tablet by mouth daily 8/12/20   Briana Bright MD   insulin lispro (HUMALOG) 100 UNIT/ML injection vial Inject 0-3 Units into the skin nightly 8/11/20   Briana Bright MD   insulin lispro (HUMALOG) 100 UNIT/ML injection vial Inject 0-6 Units into the skin 3 times daily (with meals) 8/11/20   Briana Bright MD   levothyroxine (SYNTHROID) 75 MCG tablet TAKE 1 TABLET BY MOUTH DAILY 5/28/20   Marilyn العلي PA-C   Cholecalciferol (VITAMIN D3) 1.25 MG (19677 UT) CAPS TAKE 1 CAPSULE BY MOUTH ONCE A WEEK 5/26/20   Marilyn العلي PA-C   losartan (COZAAR) 25 MG tablet Take 1 tablet by mouth daily 5/22/20   Marilyn العلي PA-C   [facility]UG [facility]ART UNIFINE PENTIPS 31G X 8 MM MISC USE AS DIRECTED ONCE DAILY 1/13/20   Marilyn العلي PA-C   magnesium oxide (MAG-OX) 400 (241.3 Mg) MG TABS tablet TAKE 1 TABLET BY MOUTH TWICE DAILY 11/11/19   Marilyn العلي PA-C   furosemide (LASIX) 40 MG tablet TAKE 1 & 1/2 (ONE AND ONE-HALF) TABLETS BY MOUTH EVERY MONDAY, WEDNESDAY & FRIDAY, and TAKE 1 TABLET EVERY Tuesday, [place], [place] & [place] 5/31/19   JENNIFER Ramos NP   allopurinol (ZYLOPRIM) 100 MG tablet TAKE 1 TABLET BY MOUTH DAILY 3/15/19   Marilyn العلي PA-C   rivaroxaban (XARELTO) 20 MG TABS tablet Take 1 tablet by mouth Daily with supper 2/15/19   Marilyn العلي PA-C   Blood Glucose Monitoring Suppl (TRUE METRIX METER) w/Device KIT Use 4 times a day. 1/22/19   Historical Provider, MD   Blood Glucose Monitoring Suppl KIT Patient to test 4 times a day. Dx: E11.65. Please dispense the brand covered by patient's insurance. 1/22/19   Marilyn العلي PA-C   blood glucose monitor strips Patient to test 4 times daily. Dx: E11.65. Please dispense the brand that is covered by insurance. 1/22/19   Marilyn العلي PA-C   Lancets MISC Patient to test 4 times a day. Dx: E11.65. Please dispense the brand covered by patient's insurance. 1/22/19   Marilyn العلي PA-C   Skin Protectants, Misc. (EUCERIN) cream Apply topically as needed for Dry Skin Apply topically as needed. Historical Provider, MD       ALLERGIES: Patient has no known allergies. REVIEW OF SYSTEM:   Unable to obtain - poor historian  Underlying dementia. OBJECTIVE  PHYSICAL EXAM: /83   Pulse 66   Temp 98.1 °F (36.7 °C) (Oral)   Resp 16   Ht 5' 1\" (1.549 m)   Wt 160 lb (72.6 kg)   SpO2 93%   BMI 30.23 kg/m²     Physical Exam  Constitutional:       General: She is not in acute distress. Appearance: She is obese. She is ill-appearing. Comments: Oriented  to person   HENT:      Head: Normocephalic. Juliane Smith MD - supervising physician

## 2020-08-22 NOTE — PROGRESS NOTES
Pt arrived to floor. Cleansed for medium soft BM. Stool light brown. Pt arouses to voice. Does not answer questions appropriate. Does not state her name when asked. States a few words, but non appropriate. Did state her birthday was [birth year].  Lungs clear. Does have multiple areas of bruising to bilateral arms and legs. Buttocks is excoriated and david-area, zinc applied. Pt dose not voice needs or pain. Call light in reach. Avisis place in room for safety.

## 2020-08-23 VITALS
HEART RATE: 67 BPM | RESPIRATION RATE: 18 BRPM | TEMPERATURE: 97.7 F | WEIGHT: 160 LBS | BODY MASS INDEX: 30.21 KG/M2 | HEIGHT: 61 IN | OXYGEN SATURATION: 91 % | DIASTOLIC BLOOD PRESSURE: 51 MMHG | SYSTOLIC BLOOD PRESSURE: 92 MMHG

## 2020-08-23 LAB
ALBUMIN SERPL-MCNC: 2.3 G/DL (ref 3.5–4.6)
ALP BLD-CCNC: 447 U/L (ref 40–130)
ALT SERPL-CCNC: 25 U/L (ref 0–33)
ANION GAP SERPL CALCULATED.3IONS-SCNC: 11 MEQ/L (ref 9–15)
AST SERPL-CCNC: 74 U/L (ref 0–35)
BASOPHILS ABSOLUTE: 0.1 K/UL (ref 0–0.2)
BASOPHILS RELATIVE PERCENT: 0.8 %
BILIRUB SERPL-MCNC: 2.5 MG/DL (ref 0.2–0.7)
BUN BLDV-MCNC: 23 MG/DL (ref 8–23)
CALCIUM SERPL-MCNC: 7.9 MG/DL (ref 8.5–9.9)
CHLORIDE BLD-SCNC: 100 MEQ/L (ref 95–107)
CO2: 24 MEQ/L (ref 20–31)
CREAT SERPL-MCNC: 0.89 MG/DL (ref 0.5–0.9)
EOSINOPHILS ABSOLUTE: 0.1 K/UL (ref 0–0.7)
EOSINOPHILS RELATIVE PERCENT: 1.9 %
GFR AFRICAN AMERICAN: >60
GFR NON-AFRICAN AMERICAN: >60
GLOBULIN: 2.6 G/DL (ref 2.3–3.5)
GLUCOSE BLD-MCNC: 84 MG/DL (ref 70–99)
GLUCOSE BLD-MCNC: 85 MG/DL (ref 60–115)
GLUCOSE BLD-MCNC: 90 MG/DL (ref 60–115)
HCT VFR BLD CALC: 32.9 % (ref 37–47)
HEMOGLOBIN: 11 G/DL (ref 12–16)
LYMPHOCYTES ABSOLUTE: 1.1 K/UL (ref 1–4.8)
LYMPHOCYTES RELATIVE PERCENT: 16.9 %
MCH RBC QN AUTO: 28.5 PG (ref 27–31.3)
MCHC RBC AUTO-ENTMCNC: 33.3 % (ref 33–37)
MCV RBC AUTO: 85.6 FL (ref 82–100)
MONOCYTES ABSOLUTE: 0.5 K/UL (ref 0.2–0.8)
MONOCYTES RELATIVE PERCENT: 7.6 %
NEUTROPHILS ABSOLUTE: 4.5 K/UL (ref 1.4–6.5)
NEUTROPHILS RELATIVE PERCENT: 72.8 %
PDW BLD-RTO: 17.1 % (ref 11.5–14.5)
PERFORMED ON: NORMAL
PERFORMED ON: NORMAL
PLATELET # BLD: 156 K/UL (ref 130–400)
POTASSIUM REFLEX MAGNESIUM: 3.6 MEQ/L (ref 3.4–4.9)
RBC # BLD: 3.84 M/UL (ref 4.2–5.4)
SARS-COV-2, NAAT: NOT DETECTED
SODIUM BLD-SCNC: 135 MEQ/L (ref 135–144)
TOTAL PROTEIN: 4.9 G/DL (ref 6.3–8)
TSH SERPL DL<=0.05 MIU/L-ACNC: 9.28 UIU/ML (ref 0.44–3.86)
WBC # BLD: 6.2 K/UL (ref 4.8–10.8)

## 2020-08-23 PROCEDURE — 2580000003 HC RX 258: Performed by: NURSE PRACTITIONER

## 2020-08-23 PROCEDURE — 6360000002 HC RX W HCPCS: Performed by: NURSE PRACTITIONER

## 2020-08-23 PROCEDURE — 85025 COMPLETE CBC W/AUTO DIFF WBC: CPT

## 2020-08-23 PROCEDURE — 84443 ASSAY THYROID STIM HORMONE: CPT

## 2020-08-23 PROCEDURE — 36415 COLL VENOUS BLD VENIPUNCTURE: CPT

## 2020-08-23 PROCEDURE — 80053 COMPREHEN METABOLIC PANEL: CPT

## 2020-08-23 PROCEDURE — U0002 COVID-19 LAB TEST NON-CDC: HCPCS

## 2020-08-23 RX ORDER — CIPROFLOXACIN 500 MG/1
500 TABLET, FILM COATED ORAL 2 TIMES DAILY
Qty: 14 TABLET | Refills: 0 | Status: SHIPPED | OUTPATIENT
Start: 2020-08-23 | End: 2020-08-30

## 2020-08-23 RX ORDER — METRONIDAZOLE 500 MG/1
500 TABLET ORAL 2 TIMES DAILY
Qty: 14 TABLET | Refills: 0 | Status: SHIPPED | OUTPATIENT
Start: 2020-08-23 | End: 2020-08-30

## 2020-08-23 RX ADMIN — PIPERACILLIN AND TAZOBACTAM 3.38 G: 3; .375 INJECTION, POWDER, LYOPHILIZED, FOR SOLUTION INTRAVENOUS at 06:13

## 2020-08-23 RX ADMIN — SODIUM CHLORIDE: 9 INJECTION, SOLUTION INTRAVENOUS at 06:13

## 2020-08-23 NOTE — CARE COORDINATION
HILLW spoke with pt's son and daughter to let them know that the pt is returning to [facility] today. KEVIN spoke with Smitha Vigil at [facility] and he confirmed that the pt can return to them today.   Ambulance was arranged for today at 2:30pm.

## 2020-08-23 NOTE — DISCHARGE SUMMARY
25   BILIDIR  --  1.8*  --    BILITOT 2.6* 2.6* 2.5*   ALKPHOS 502* 502* 447*     No results for input(s): INR in the last 72 hours. No results for input(s): Zaid Lust in the last 72 hours. Urinalysis:   Lab Results   Component Value Date    NITRU Negative 2020    WBCUA 3-5 2020    BACTERIA Negative 2020    RBCUA 3-5 2020    BLOODU Negative 2020    SPECGRAV 1.013 2020    GLUCOSEU Negative 2020       Radiology:   Most recent    Chest CT      WITH CONTRAST:No results found for this or any previous visit. WITHOUT CONTRAST: No results found for this or any previous visit. CXR      2-view:   Results for orders placed during the hospital encounter of 18   XR CHEST STANDARD (2 VW)    Narrative EXAMINATION: XR CHEST (2 VW)    CLINICAL HISTORY: HYPOXIA    COMPARISONS: 2017    FINDINGS: Patient leaning to right. Osteopenia. Cardiopericardial silhouette enlarged and unchanged. Aorta calcified. Area of increased opacity identified medially in right lower lung. Left lung clear. Impression STABLE CARDIOMEGALY. RIGHT LOWER LUNG ATELECTASIS/PNEUMONIA. Portable:   Results for orders placed during the hospital encounter of 20   XR CHEST PORTABLE    Narrative Patient MRN: [id number]    : 1935    Age:  80 years    Gender: Female    Order Date: 2020 3:20 PM.     Exam: XR CHEST PORTABLE    Number of Views: 1     Indication:  Altered mental status/fall    Comparison: 2019    Findings: Stable, enlarged cardiomediastinal silhouette with underlying central pulmonary vascular congestion, right hemidiaphragmatic elevation and thoracic aortic vascular calcifications. Airspace opacification right medial inferior lung base. Impression Impression:    1. Stable, enlarged cardiomediastinal silhouette with underlying central pulmonary vascular congestion right hemidiaphragmatic elevation. 2. Vascular calcifications thoracic aorta.   3. EVERY Tuesday, THURSDAY, SATURDay & sun  Qty: 60 tablet, Refills: 1      allopurinol (ZYLOPRIM) 100 MG tablet TAKE 1 TABLET BY MOUTH DAILY  Qty: 30 tablet, Refills: 5    Associated Diagnoses: Essential hypertension      rivaroxaban (XARELTO) 20 MG TABS tablet Take 1 tablet by mouth Daily with supper  Qty: 30 tablet, Refills: 5      !! insulin lispro (HUMALOG) 100 UNIT/ML injection vial Inject 0-3 Units into the skin nightly  Qty: 1 vial, Refills: 3      !! insulin lispro (HUMALOG) 100 UNIT/ML injection vial Inject 0-6 Units into the skin 3 times daily (with meals)  Qty: 1 vial, Refills: 3      DRUG MART UNIFINE PENTIPS 31G X 8 MM MISC USE AS DIRECTED ONCE DAILY  Qty: 100 each, Refills: 3      Blood Glucose Monitoring Suppl (TRUE METRIX METER) w/Device KIT Use 4 times a day. Refills: 0      Blood Glucose Monitoring Suppl KIT Patient to test 4 times a day. Dx: E11.65. Please dispense the brand covered by patient's insurance. Qty: 1 kit, Refills: 0      blood glucose monitor strips Patient to test 4 times daily. Dx: E11.65. Please dispense the brand that is covered by insurance. Qty: 400 strip, Refills: 11      Lancets MISC Patient to test 4 times a day. Dx: E11.65. Please dispense the brand covered by patient's insurance. Qty: 400 each, Refills: 11      Skin Protectants, Misc. (EUCERIN) cream Apply topically as needed for Dry Skin Apply topically as needed. !! - Potential duplicate medications found. Please discuss with provider. STOP taking these medications       folic acid (FOLVITE) 1 MG tablet Comments:   Reason for Stopping:             Activity: activity as tolerated  Diet: encourage fluids  Wound Care: none needed    Follow-up with Marilyn العلي PA-C  in 2 weeks.     DC time 35 minutes    Signed:  Electronically signed by Asif Payne DO on 8/23/2020 at 1:43 PM

## 2020-08-23 NOTE — DISCHARGE INSTR - COC
Continuity of Care Form    Patient Name: Abagail Ormond   :  1935  MRN:  [id number]    Admit date:  2020  Discharge date:  ***    Code Status Order: Full Code   Advance Directives:     Admitting Physician: Mandi Yee MD  PCP: Elaina Browne PA-C    Discharging Nurse: Electronically signed by Vicky Pizarro RN on 2020 at 12:44 PM  [address] Unit/Room#: [address]  Unit Phone Number: [phone]    Emergency Contact:   Extended Emergency Contact Information  Primary Emergency Contact: SengDelvine  Address: [address] [address], [address] [place] [address] Phone: [phone]  Mobile Phone: [phone]  Relation: Child  Secondary Emergency Contact: Christi Nguyen [address] Phone: [phone]  Relation: Child    Past Surgical History:  Past Surgical History:   Procedure Laterality Date    FRACTURE SURGERY         Immunization History:   Immunization History   Administered Date(s) Administered    Influenza, High Dose (Fluzone 65 yrs and older) 2017, 2017, 10/09/2018    Pneumococcal Conjugate 13-valent (Sherrie Purpura) 2016    Pneumococcal Polysaccharide (Eutovhypg23) 2017    Tdap (Boostrix, Adacel) 2018       Active Problems:  Patient Active Problem List   Diagnosis Code    Type 2 diabetes mellitus with hyperglycemia, without long-term current use of insulin ([facility] E11.65    Obesity E66.9    HTN (hypertension) I10    Hypothyroidism E03.9    Edema R60.9    Chronic a-fib I48.20    Ataxic gait R26.0    Dementia ([address] F03.90    Chronic gout M1A. 9XX0    Blood thinned due to long-term anticoagulant use Z79.01    DJD (degenerative joint disease), cervical M47.812    OA (osteoarthritis) M19.90    Decreased activities of daily living (ADL) Z78.9    Hypomagnesemia E83.42    Recurrent UTI N39.0    Hypokalemia E87.6    UTI (urinary tract infection) N39.0    Encephalopathy acute G93.40    Multiple falls R29.6    Closed head injury S09.90XA    Goals of care, counseling/discussion Z71.89    Disorientation R41.0    Dehydration E86.0    Left adrenal mass (HCC) E27.8    Acute encephalopathy G93.40    Cholecystitis K81.9    Restrictive cardiomyopathy (HCC) I42.5    Valvular heart disease I38    Mural thrombus of heart I51.3       Isolation/Infection:   Isolation          No Isolation        Patient Infection Status     Infection Onset Added Last Indicated Last Indicated By Review Planned Expiration Resolved Resolved By    COVID-19 Rule Out 08/23/20 08/23/20 08/23/20 COVID-19 (Ordered) 08/30/20 09/06/20      Resolved    COVID-19 Rule Out 08/22/20 08/22/20 08/22/20 COVID-19 (Ordered)   08/22/20 Rule-Out Test Resulted    COVID-19 Rule Out 08/11/20 08/11/20 08/11/20 COVID-19 (Ordered)   08/11/20 Rule-Out Test Resulted          Nurse Assessment:  Last Vital Signs: /63   Pulse 88   Temp 97.3 °F (36.3 °C) (Oral)   Resp 18   Ht 5' 1\" (1.549 m)   Wt 160 lb (72.6 kg)   SpO2 91%   BMI 30.23 kg/m²     Last documented pain score (0-10 scale):    Last Weight:   Wt Readings from Last 1 Encounters:   08/21/20 160 lb (72.6 kg)     Mental Status:  disoriented    IV Access:  - None    Nursing Mobility/ADLs:  Walking   Dependent  Transfer  Dependent  Bathing  Dependent  Dressing  Dependent  Toileting  Dependent  Feeding  Dependent  Med Admin  Dependent  Med Delivery   crushed    Wound Care Documentation and Therapy:        Elimination:  Continence:   · Bowel: No  · Bladder: No  Urinary Catheter: Insertion Date: 08/21/2020   Colostomy/Ileostomy/Ileal Conduit: No       Date of Last BM: 08/22/20    Intake/Output Summary (Last 24 hours) at 8/23/2020 1111  Last data filed at 8/23/2020 5631  Gross per 24 hour   Intake 1870 ml   Output 575 ml   Net 1295 ml     I/O last 3 completed shifts: In: 1870 [P.O.:220;  I.V.:1650]  Out: 18 [Urine:575]    Safety Concerns:     [employer] [place], History of Falls (last 30 days) and At Arnulfo Litten, DO on 8/23/20 at 11:11 AM EDT

## 2020-08-24 PROCEDURE — 93010 ELECTROCARDIOGRAM REPORT: CPT | Performed by: INTERNAL MEDICINE

## 2020-09-07 PROBLEM — N39.0 UTI (URINARY TRACT INFECTION): Status: RESOLVED | Noted: 2020-08-08 | Resolved: 2020-09-07

## 2020-09-22 ENCOUNTER — OFFICE VISIT (OUTPATIENT)
Dept: PALLATIVE CARE | Age: 85
End: 2020-09-22
Payer: MEDICARE

## 2020-09-22 VITALS
DIASTOLIC BLOOD PRESSURE: 70 MMHG | RESPIRATION RATE: 18 BRPM | HEART RATE: 70 BPM | OXYGEN SATURATION: 98 % | SYSTOLIC BLOOD PRESSURE: 122 MMHG

## 2020-09-22 PROCEDURE — 1123F ACP DISCUSS/DSCN MKR DOCD: CPT | Performed by: FAMILY MEDICINE

## 2020-09-22 PROCEDURE — 99306 1ST NF CARE HIGH MDM 50: CPT | Performed by: FAMILY MEDICINE

## 2020-09-22 ASSESSMENT — ENCOUNTER SYMPTOMS
SORE THROAT: 0
NAUSEA: 0
COUGH: 0
CONSTIPATION: 0
ALLERGIC/IMMUNOLOGIC NEGATIVE: 1
WHEEZING: 0
ABDOMINAL DISTENTION: 1
VOICE CHANGE: 0
EYES NEGATIVE: 1
COLOR CHANGE: 0
SHORTNESS OF BREATH: 0

## 2020-09-22 NOTE — PROGRESS NOTES
Subjective:      Patient Id: Seen William Avila at , for initial palliative medicine consult for symptom management, advance care planning and goals of care discussion. She was accompanied to the appointment by: her aid and nurse. Chief Complaint   Patient presents with    Referral - General      HPI       Marian Conway is a 80 y.o. female referred to palliative care by Donald Farr CNP for symptom management, advance care planning, and goals of care conversation. William Avila has complex medical history that includes Dementia, cholecystitis, A-fib, DJD, DM2, Obesity. Patient denies any acute complaints or concerns at visit. A+Ox3 at time of visit, Hx dementia. Vitals WNL, Denies any symptomology at visit. Weight increased on admission from 194.2 to 188. 6. Nursing states pt eating well. Working with NH PT/OT regarding ambulation and strength. Has follow up in near future regarding adrenal mass. Denies significant sleep disturbance, depression, anxiety, or agitation episodes; denies suicidal or homicidal ideation or signs suggesting existential grief or spiritual pain. Was referred due to chronic nature of multiple diseases and high risk for readmission. Reviewed labs with patient and explained in length chronic nature of diseases and need for palliative care though she states she \"doesn't not want any more doctors\" at this time. Past Medical History:   Diagnosis Date    Anticoagulant long-term use     Arthritis     Atrial fibrillation (HCC)     DJD (degenerative joint disease), cervical 1/9/2019    Hypertension     Hypothyroidism     Obesity     Type II or unspecified type diabetes mellitus without mention of complication, not stated as uncontrolled      Past Surgical History:   Procedure Laterality Date    FRACTURE SURGERY       Social History     Socioeconomic History    Marital status:       Spouse name: Not on file    Number of children: Not on file    Years of education: Not on file    Highest education level: Not on file   Occupational History    Not on file   Social Needs    Financial resource strain: Not on file    Food insecurity     Worry: Not on file     Inability: Not on file    Transportation needs     Medical: Not on file     Non-medical: Not on file   Tobacco Use    Smoking status: Former Smoker    Smokeless tobacco: Never Used   Substance and Sexual Activity    Alcohol use: No    Drug use: No    Sexual activity: Never   Lifestyle    Physical activity     Days per week: Not on file     Minutes per session: Not on file    Stress: Not on file   Relationships    Social connections     Talks on phone: Not on file     Gets together: Not on file     Attends Moravian service: Not on file     Active member of club or organization: Not on file     Attends meetings of clubs or organizations: Not on file     Relationship status: Not on file    Intimate partner violence     Fear of current or ex partner: Not on file     Emotionally abused: Not on file     Physically abused: Not on file     Forced sexual activity: Not on file   Other Topics Concern    Not on file   Social History Narrative         Lives With: Son    Type of Home: House- One [address] [place] [address],[address] in [address] [place] [address] Access: Stairs to enter with rails    Entrance Stairs - Number of Steps: 4 - Rails: Right    Bathroom Shower/Tub: Walk-in shower    Bathroom Equipment: Grab bars in shower    Home Equipment: Rolling walker    ADL Assistance: Needs assistance (son)    Homemaking Assistance: Needs assistance (son)    Ambulation Assistance: Independent (2ww)    Transfer Assistance: Independent    Active : No    Additional Comments: info provided by son who is primary caregiver               Family History   Problem Relation Age of Onset    Stroke Mother     Cancer Father     Heart Disease Brother     Heart Attack Brother      No Known Allergies  Current Outpatient Medications on File Prior to Visit   Medication Sig Dispense Refill    docusate sodium (COLACE) 100 MG capsule Take 100 mg by mouth 2 times daily      insulin lispro (HUMALOG) 100 UNIT/ML injection vial Inject 0-3 Units into the skin nightly 1 vial 3    insulin lispro (HUMALOG) 100 UNIT/ML injection vial Inject 0-6 Units into the skin 3 times daily (with meals) 1 vial 3    levothyroxine (SYNTHROID) 75 MCG tablet TAKE 1 TABLET BY MOUTH DAILY 30 tablet 0    Cholecalciferol (VITAMIN D3) 1.25 MG (55192 UT) CAPS TAKE 1 CAPSULE BY MOUTH ONCE A WEEK 12 capsule 1    losartan (COZAAR) 25 MG tablet Take 1 tablet by mouth daily (Patient taking differently: Take 50 mg by mouth daily ) 30 tablet 0    DRUG MART UNIFINE PENTIPS 31G X 8 MM MISC USE AS DIRECTED ONCE DAILY 100 each 3    magnesium oxide (MAG-OX) 400 (241.3 Mg) MG TABS tablet TAKE 1 TABLET BY MOUTH TWICE DAILY 30 tablet 2    furosemide (LASIX) 40 MG tablet TAKE 1 & 1/2 (ONE AND ONE-HALF) TABLETS BY MOUTH EVERY MONDAY, WEDNESDAY & FRIDAY, and TAKE 1 TABLET EVERY Tuesday, THURSDAY, SATURDay & sun 60 tablet 1    allopurinol (ZYLOPRIM) 100 MG tablet TAKE 1 TABLET BY MOUTH DAILY 30 tablet 5    rivaroxaban (XARELTO) 20 MG TABS tablet Take 1 tablet by mouth Daily with supper 30 tablet 5    Blood Glucose Monitoring Suppl (TRUE METRIX METER) w/Device KIT Use 4 times a day.  0    Blood Glucose Monitoring Suppl KIT Patient to test 4 times a day. Dx: E11.65. Please dispense the brand covered by patient's insurance. 1 kit 0    blood glucose monitor strips Patient to test 4 times daily. Dx: E11.65. Please dispense the brand that is covered by insurance. 400 strip 11    Lancets MISC Patient to test 4 times a day. Dx: E11.65. Please dispense the brand covered by patient's insurance. 400 each 11    Skin Protectants, Misc. (EUCERIN) cream Apply topically as needed for Dry Skin Apply topically as needed. No current facility-administered medications on file prior to visit.         Review of jaundiced or pale. Findings: No erythema or rash. Neurological:      Mental Status: She is alert and oriented to person, place, and time. Motor: Weakness present. Gait: Gait abnormal.      Comments: Intermittent confusion   Psychiatric:         Behavior: Behavior normal.         Assessment and Plan:      1. Edema, unspecified type  2. Valvular heart disease  3. Mural thrombus of heart  4. Dementia without behavioral disturbance, unspecified dementia type ([address]  5. Cholecystitis  6. Blood thinned due to long-term anticoagulant use  7. Chronic a-fib  8. Decreased activities of daily living (ADL)  9. Encounter for palliative care    Reviewed labs with patient and explained in length chronic nature of diseases and need for palliative care though she refused services. Will attempt family outreach. There are no discontinued medications. - Advance Care Planning   We discussed Living Will (LW), and [facility]) as well as their activation. Patient choice discussed. LW/HCPOA in place Yes; Tasked  for assistance with completing paperwork No; Code status discussed Yes; signed No. Code DNR/CCO. All related questions were answered completely. - Goals of Care Discussion:  Disease process and goals of treatment were discussed in basic terms. Rachristyra's goal is to optimize available comfort care measures. We discussed the palliative care philosophy in light of those goals. We discussed all care options contingent on treatment response and QOL. Much active listening, presence, and emotional support were given. Discussed with patient/surrogate: POC, Treatment risks/benefits, and alternatives. All questions were answered. Additionally, a printed copy of the CDC medications/opioid safety informational sheet was reviewed and given to patient for reference.  Opioid contract signed:No    Due to acuity, symptomatology and high-risk medication management, I advised patient to Return in about 1 month (around 10/22/2020), or if symptoms worsen or fail to improve. Thanks for the opportunity you have allowed us to provide palliative care to [address] [address]. We will be in touch as care progresses. Please feel free to reach out to us should you have any questions or requests.     Total Time 45 mins   > 50% Time Spent Counseling/Care coordination yes       JENNIFER Dubon - CNP    Collaborating physician: Dr. George Bull

## 2020-10-26 ENCOUNTER — TELEPHONE (OUTPATIENT)
Dept: FAMILY MEDICINE CLINIC | Age: 85
End: 2020-10-26

## 2020-10-26 NOTE — TELEPHONE ENCOUNTER
Ketty Valentin from [facility] called to find out if you are going to follow this patient after her discharge from the hospice. Discharge pending. Please call Ketty Valentin with the information. Please advise and thanks!